# Patient Record
Sex: FEMALE | Race: WHITE | NOT HISPANIC OR LATINO | Employment: UNEMPLOYED | URBAN - METROPOLITAN AREA
[De-identification: names, ages, dates, MRNs, and addresses within clinical notes are randomized per-mention and may not be internally consistent; named-entity substitution may affect disease eponyms.]

---

## 2017-01-02 ENCOUNTER — COMMUNICATION - HEALTHEAST (OUTPATIENT)
Dept: FAMILY MEDICINE | Facility: CLINIC | Age: 52
End: 2017-01-02

## 2017-01-02 DIAGNOSIS — E03.9 HYPOTHYROIDISM: ICD-10-CM

## 2017-02-24 ENCOUNTER — OFFICE VISIT - HEALTHEAST (OUTPATIENT)
Dept: FAMILY MEDICINE | Facility: CLINIC | Age: 52
End: 2017-02-24

## 2017-02-24 DIAGNOSIS — J40 BRONCHITIS: ICD-10-CM

## 2017-02-24 DIAGNOSIS — J32.9 SINUSITIS: ICD-10-CM

## 2017-02-24 DIAGNOSIS — H66.91 RIGHT OTITIS MEDIA: ICD-10-CM

## 2017-03-06 ENCOUNTER — COMMUNICATION - HEALTHEAST (OUTPATIENT)
Dept: FAMILY MEDICINE | Facility: CLINIC | Age: 52
End: 2017-03-06

## 2017-03-06 DIAGNOSIS — E03.9 HYPOTHYROIDISM: ICD-10-CM

## 2017-03-09 ENCOUNTER — COMMUNICATION - HEALTHEAST (OUTPATIENT)
Dept: FAMILY MEDICINE | Facility: CLINIC | Age: 52
End: 2017-03-09

## 2017-03-09 DIAGNOSIS — E03.9 HYPOTHYROIDISM: ICD-10-CM

## 2017-06-05 ENCOUNTER — COMMUNICATION - HEALTHEAST (OUTPATIENT)
Dept: FAMILY MEDICINE | Facility: CLINIC | Age: 52
End: 2017-06-05

## 2017-06-05 DIAGNOSIS — E03.9 HYPOTHYROIDISM: ICD-10-CM

## 2017-07-09 ENCOUNTER — OFFICE VISIT - HEALTHEAST (OUTPATIENT)
Dept: FAMILY MEDICINE | Facility: CLINIC | Age: 52
End: 2017-07-09

## 2017-07-09 DIAGNOSIS — J32.9 SINUSITIS: ICD-10-CM

## 2017-07-09 DIAGNOSIS — J02.9 SORE THROAT: ICD-10-CM

## 2017-09-05 ENCOUNTER — COMMUNICATION - HEALTHEAST (OUTPATIENT)
Dept: FAMILY MEDICINE | Facility: CLINIC | Age: 52
End: 2017-09-05

## 2017-09-05 DIAGNOSIS — E03.9 HYPOTHYROIDISM: ICD-10-CM

## 2017-10-18 ENCOUNTER — COMMUNICATION - HEALTHEAST (OUTPATIENT)
Dept: FAMILY MEDICINE | Facility: CLINIC | Age: 52
End: 2017-10-18

## 2017-11-28 ENCOUNTER — OFFICE VISIT - HEALTHEAST (OUTPATIENT)
Dept: INTERNAL MEDICINE | Facility: CLINIC | Age: 52
End: 2017-11-28

## 2017-11-28 DIAGNOSIS — Z12.31 ENCOUNTER FOR SCREENING MAMMOGRAM FOR BREAST CANCER: ICD-10-CM

## 2017-11-28 DIAGNOSIS — L20.82 FLEXURAL ECZEMA: ICD-10-CM

## 2017-11-28 DIAGNOSIS — R21 RASH/SKIN ERUPTION: ICD-10-CM

## 2017-12-02 ENCOUNTER — COMMUNICATION - HEALTHEAST (OUTPATIENT)
Dept: FAMILY MEDICINE | Facility: CLINIC | Age: 52
End: 2017-12-02

## 2017-12-02 DIAGNOSIS — E78.5 HYPERLIPIDEMIA: ICD-10-CM

## 2017-12-03 ENCOUNTER — COMMUNICATION - HEALTHEAST (OUTPATIENT)
Dept: FAMILY MEDICINE | Facility: CLINIC | Age: 52
End: 2017-12-03

## 2017-12-03 DIAGNOSIS — E03.9 HYPOTHYROIDISM: ICD-10-CM

## 2017-12-21 ENCOUNTER — HOSPITAL ENCOUNTER (OUTPATIENT)
Dept: MAMMOGRAPHY | Facility: HOSPITAL | Age: 52
Discharge: HOME | End: 2017-12-21

## 2018-03-01 ENCOUNTER — COMMUNICATION - HEALTHEAST (OUTPATIENT)
Dept: FAMILY MEDICINE | Facility: CLINIC | Age: 53
End: 2018-03-01

## 2018-03-01 DIAGNOSIS — E03.9 HYPOTHYROIDISM: ICD-10-CM

## 2018-03-02 ENCOUNTER — OFFICE VISIT - HEALTHEAST (OUTPATIENT)
Dept: FAMILY MEDICINE | Facility: CLINIC | Age: 53
End: 2018-03-02

## 2018-03-02 DIAGNOSIS — E66.9 DIABETES MELLITUS TYPE 2 IN OBESE: ICD-10-CM

## 2018-03-02 DIAGNOSIS — E03.9 HYPOTHYROIDISM: ICD-10-CM

## 2018-03-02 DIAGNOSIS — E66.01 MORBID OBESITY (H): ICD-10-CM

## 2018-03-02 DIAGNOSIS — E78.5 HYPERLIPIDEMIA: ICD-10-CM

## 2018-03-02 DIAGNOSIS — E11.69 DIABETES MELLITUS TYPE 2 IN OBESE: ICD-10-CM

## 2018-03-02 DIAGNOSIS — Z12.11 COLON CANCER SCREENING: ICD-10-CM

## 2018-03-02 DIAGNOSIS — H93.8X1 PLUGGED FEELING IN EAR, RIGHT: ICD-10-CM

## 2018-03-02 DIAGNOSIS — R09.81 NASAL CONGESTION: ICD-10-CM

## 2018-03-02 LAB
ALBUMIN SERPL-MCNC: 3.7 G/DL (ref 3.5–5)
ALP SERPL-CCNC: 98 U/L (ref 45–120)
ALT SERPL W P-5'-P-CCNC: 118 U/L (ref 0–45)
ANION GAP SERPL CALCULATED.3IONS-SCNC: 8 MMOL/L (ref 5–18)
AST SERPL W P-5'-P-CCNC: 68 U/L (ref 0–40)
BILIRUB SERPL-MCNC: 0.5 MG/DL (ref 0–1)
BUN SERPL-MCNC: 14 MG/DL (ref 8–22)
CALCIUM SERPL-MCNC: 9.6 MG/DL (ref 8.5–10.5)
CHLORIDE BLD-SCNC: 107 MMOL/L (ref 98–107)
CHOLEST SERPL-MCNC: 189 MG/DL
CO2 SERPL-SCNC: 27 MMOL/L (ref 22–31)
CREAT SERPL-MCNC: 0.91 MG/DL (ref 0.6–1.1)
FASTING STATUS PATIENT QL REPORTED: YES
GFR SERPL CREATININE-BSD FRML MDRD: >60 ML/MIN/1.73M2
GLUCOSE BLD-MCNC: 147 MG/DL (ref 70–125)
HBA1C MFR BLD: 6.5 % (ref 3.5–6)
HDLC SERPL-MCNC: 47 MG/DL
LDLC SERPL CALC-MCNC: 117 MG/DL
POTASSIUM BLD-SCNC: 4.8 MMOL/L (ref 3.5–5)
PROT SERPL-MCNC: 7.2 G/DL (ref 6–8)
SODIUM SERPL-SCNC: 142 MMOL/L (ref 136–145)
TRIGL SERPL-MCNC: 126 MG/DL
TSH SERPL DL<=0.005 MIU/L-ACNC: 0.31 UIU/ML (ref 0.3–5)

## 2018-03-05 ENCOUNTER — COMMUNICATION - HEALTHEAST (OUTPATIENT)
Dept: FAMILY MEDICINE | Facility: CLINIC | Age: 53
End: 2018-03-05

## 2018-10-03 ENCOUNTER — COMMUNICATION - HEALTHEAST (OUTPATIENT)
Dept: FAMILY MEDICINE | Facility: CLINIC | Age: 53
End: 2018-10-03

## 2018-10-03 DIAGNOSIS — E78.5 HYPERLIPIDEMIA: ICD-10-CM

## 2018-10-03 DIAGNOSIS — E03.9 HYPOTHYROIDISM: ICD-10-CM

## 2018-12-20 ENCOUNTER — COMMUNICATION - HEALTHEAST (OUTPATIENT)
Dept: FAMILY MEDICINE | Facility: CLINIC | Age: 53
End: 2018-12-20

## 2018-12-20 DIAGNOSIS — E78.5 HYPERLIPIDEMIA: ICD-10-CM

## 2018-12-31 ENCOUNTER — COMMUNICATION - HEALTHEAST (OUTPATIENT)
Dept: FAMILY MEDICINE | Facility: CLINIC | Age: 53
End: 2018-12-31

## 2018-12-31 DIAGNOSIS — E03.9 HYPOTHYROIDISM: ICD-10-CM

## 2019-03-27 ENCOUNTER — COMMUNICATION - HEALTHEAST (OUTPATIENT)
Dept: FAMILY MEDICINE | Facility: CLINIC | Age: 54
End: 2019-03-27

## 2019-03-27 DIAGNOSIS — E78.5 HYPERLIPIDEMIA: ICD-10-CM

## 2019-04-01 ENCOUNTER — COMMUNICATION - HEALTHEAST (OUTPATIENT)
Dept: FAMILY MEDICINE | Facility: CLINIC | Age: 54
End: 2019-04-01

## 2019-04-01 DIAGNOSIS — E03.9 HYPOTHYROIDISM: ICD-10-CM

## 2019-04-11 ENCOUNTER — COMMUNICATION - HEALTHEAST (OUTPATIENT)
Dept: FAMILY MEDICINE | Facility: CLINIC | Age: 54
End: 2019-04-11

## 2019-04-11 DIAGNOSIS — H93.8X1 PLUGGED FEELING IN EAR, RIGHT: ICD-10-CM

## 2019-04-11 DIAGNOSIS — E78.5 HYPERLIPIDEMIA: ICD-10-CM

## 2019-04-11 DIAGNOSIS — R09.81 NASAL CONGESTION: ICD-10-CM

## 2019-04-11 DIAGNOSIS — L20.82 FLEXURAL ECZEMA: ICD-10-CM

## 2019-04-11 DIAGNOSIS — E66.9 DIABETES MELLITUS TYPE 2 IN OBESE: ICD-10-CM

## 2019-04-11 DIAGNOSIS — E11.69 DIABETES MELLITUS TYPE 2 IN OBESE: ICD-10-CM

## 2019-07-07 ENCOUNTER — COMMUNICATION - HEALTHEAST (OUTPATIENT)
Dept: FAMILY MEDICINE | Facility: CLINIC | Age: 54
End: 2019-07-07

## 2019-07-07 DIAGNOSIS — E03.9 HYPOTHYROIDISM: ICD-10-CM

## 2019-08-08 ENCOUNTER — OFFICE VISIT - HEALTHEAST (OUTPATIENT)
Dept: FAMILY MEDICINE | Facility: CLINIC | Age: 54
End: 2019-08-08

## 2019-08-08 DIAGNOSIS — L20.82 FLEXURAL ECZEMA: ICD-10-CM

## 2019-08-08 DIAGNOSIS — E11.69 DIABETES MELLITUS TYPE 2 IN OBESE: ICD-10-CM

## 2019-08-08 DIAGNOSIS — Z12.11 SCREENING FOR COLON CANCER: ICD-10-CM

## 2019-08-08 DIAGNOSIS — Z12.31 VISIT FOR SCREENING MAMMOGRAM: ICD-10-CM

## 2019-08-08 DIAGNOSIS — Z78.0 MENOPAUSE: ICD-10-CM

## 2019-08-08 DIAGNOSIS — E03.9 HYPOTHYROIDISM: ICD-10-CM

## 2019-08-08 DIAGNOSIS — E78.5 HYPERLIPIDEMIA: ICD-10-CM

## 2019-08-08 DIAGNOSIS — E78.5 HYPERLIPIDEMIA, UNSPECIFIED HYPERLIPIDEMIA TYPE: ICD-10-CM

## 2019-08-08 DIAGNOSIS — E66.9 DIABETES MELLITUS TYPE 2 IN OBESE: ICD-10-CM

## 2019-08-08 DIAGNOSIS — E03.9 HYPOTHYROIDISM, UNSPECIFIED TYPE: ICD-10-CM

## 2019-08-08 DIAGNOSIS — Z00.00 ROUTINE GENERAL MEDICAL EXAMINATION AT A HEALTH CARE FACILITY: ICD-10-CM

## 2019-08-08 LAB
ALBUMIN SERPL-MCNC: 4 G/DL (ref 3.5–5)
ALP SERPL-CCNC: 108 U/L (ref 45–120)
ALT SERPL W P-5'-P-CCNC: 48 U/L (ref 0–45)
ANION GAP SERPL CALCULATED.3IONS-SCNC: 10 MMOL/L (ref 5–18)
AST SERPL W P-5'-P-CCNC: 30 U/L (ref 0–40)
BILIRUB SERPL-MCNC: 0.3 MG/DL (ref 0–1)
BUN SERPL-MCNC: 22 MG/DL (ref 8–22)
CALCIUM SERPL-MCNC: 10 MG/DL (ref 8.5–10.5)
CHLORIDE BLD-SCNC: 109 MMOL/L (ref 98–107)
CHOLEST SERPL-MCNC: 179 MG/DL
CO2 SERPL-SCNC: 25 MMOL/L (ref 22–31)
CREAT SERPL-MCNC: 1 MG/DL (ref 0.6–1.1)
CREAT UR-MCNC: 204 MG/DL
ERYTHROCYTE [DISTWIDTH] IN BLOOD BY AUTOMATED COUNT: 11.6 % (ref 11–14.5)
FASTING STATUS PATIENT QL REPORTED: NO
GFR SERPL CREATININE-BSD FRML MDRD: 58 ML/MIN/1.73M2
GLUCOSE BLD-MCNC: 143 MG/DL (ref 70–125)
HBA1C MFR BLD: 6.4 % (ref 3.5–6)
HCT VFR BLD AUTO: 41.7 % (ref 35–47)
HDLC SERPL-MCNC: 49 MG/DL
HGB BLD-MCNC: 13.8 G/DL (ref 12–16)
LDLC SERPL CALC-MCNC: 96 MG/DL
MCH RBC QN AUTO: 29.9 PG (ref 27–34)
MCHC RBC AUTO-ENTMCNC: 33.1 G/DL (ref 32–36)
MCV RBC AUTO: 90 FL (ref 80–100)
MICROALBUMIN UR-MCNC: 1.81 MG/DL (ref 0–1.99)
MICROALBUMIN/CREAT UR: 8.9 MG/G
PLATELET # BLD AUTO: 280 THOU/UL (ref 140–440)
PMV BLD AUTO: 8.1 FL (ref 7–10)
POTASSIUM BLD-SCNC: 4.4 MMOL/L (ref 3.5–5)
PROT SERPL-MCNC: 7.3 G/DL (ref 6–8)
RBC # BLD AUTO: 4.62 MILL/UL (ref 3.8–5.4)
SODIUM SERPL-SCNC: 144 MMOL/L (ref 136–145)
TRIGL SERPL-MCNC: 171 MG/DL
TSH SERPL DL<=0.005 MIU/L-ACNC: 0.7 UIU/ML (ref 0.3–5)
WBC: 7.5 THOU/UL (ref 4–11)

## 2019-08-08 RX ORDER — TRIAMCINOLONE ACETONIDE 1 MG/G
CREAM TOPICAL 2 TIMES DAILY
Qty: 45 G | Refills: 3 | Status: SHIPPED | OUTPATIENT
Start: 2019-08-08 | End: 2021-10-04

## 2019-08-08 ASSESSMENT — MIFFLIN-ST. JEOR: SCORE: 2027.37

## 2019-08-09 LAB
HPV SOURCE: NORMAL
HUMAN PAPILLOMA VIRUS 16 DNA: NEGATIVE
HUMAN PAPILLOMA VIRUS 18 DNA: NEGATIVE
HUMAN PAPILLOMA VIRUS FINAL DIAGNOSIS: NORMAL
HUMAN PAPILLOMA VIRUS OTHER HR: NEGATIVE
SPECIMEN DESCRIPTION: NORMAL

## 2019-08-15 ENCOUNTER — AMBULATORY - HEALTHEAST (OUTPATIENT)
Dept: FAMILY MEDICINE | Facility: CLINIC | Age: 54
End: 2019-08-15

## 2019-08-15 DIAGNOSIS — E11.69 DIABETES MELLITUS TYPE 2 IN OBESE: ICD-10-CM

## 2019-08-15 DIAGNOSIS — E66.9 DIABETES MELLITUS TYPE 2 IN OBESE: ICD-10-CM

## 2019-08-15 LAB
BKR LAB AP ABNORMAL BLEEDING: NO
BKR LAB AP BIRTH CONTROL/HORMONES: NORMAL
BKR LAB AP CERVICAL APPEARANCE: NORMAL
BKR LAB AP GYN ADEQUACY: NORMAL
BKR LAB AP GYN INTERPRETATION: NORMAL
BKR LAB AP HPV REFLEX: NORMAL
BKR LAB AP LMP: NORMAL
BKR LAB AP PATIENT STATUS: NORMAL
BKR LAB AP PREVIOUS ABNORMAL: NO
BKR LAB AP PREVIOUS NORMAL: NORMAL
HIGH RISK?: NO
PATH REPORT.COMMENTS IMP SPEC: NORMAL
RESULT FLAG (HE HISTORICAL CONVERSION): NORMAL

## 2019-08-16 ENCOUNTER — COMMUNICATION - HEALTHEAST (OUTPATIENT)
Dept: FAMILY MEDICINE | Facility: CLINIC | Age: 54
End: 2019-08-16

## 2019-12-18 ENCOUNTER — HOSPITAL ENCOUNTER (OUTPATIENT)
Dept: MAMMOGRAPHY | Facility: CLINIC | Age: 54
Discharge: HOME | End: 2019-12-18
Attending: FAMILY MEDICINE

## 2019-12-18 DIAGNOSIS — Z12.31 VISIT FOR SCREENING MAMMOGRAM: ICD-10-CM

## 2020-07-29 ENCOUNTER — OFFICE VISIT - HEALTHEAST (OUTPATIENT)
Dept: FAMILY MEDICINE | Facility: CLINIC | Age: 55
End: 2020-07-29

## 2020-07-29 DIAGNOSIS — H61.23 BILATERAL IMPACTED CERUMEN: ICD-10-CM

## 2020-07-29 DIAGNOSIS — M54.50 CHRONIC BILATERAL LOW BACK PAIN WITHOUT SCIATICA: ICD-10-CM

## 2020-07-29 DIAGNOSIS — E78.5 HYPERLIPIDEMIA, UNSPECIFIED HYPERLIPIDEMIA TYPE: ICD-10-CM

## 2020-07-29 DIAGNOSIS — R29.898 LEG WEAKNESS, BILATERAL: ICD-10-CM

## 2020-07-29 DIAGNOSIS — E11.69 DIABETES MELLITUS TYPE 2 IN OBESE: ICD-10-CM

## 2020-07-29 DIAGNOSIS — E03.9 HYPOTHYROIDISM, UNSPECIFIED TYPE: ICD-10-CM

## 2020-07-29 DIAGNOSIS — E66.9 DIABETES MELLITUS TYPE 2 IN OBESE: ICD-10-CM

## 2020-07-29 DIAGNOSIS — G89.29 CHRONIC BILATERAL LOW BACK PAIN WITHOUT SCIATICA: ICD-10-CM

## 2020-07-29 LAB
ALBUMIN SERPL-MCNC: 3.9 G/DL (ref 3.5–5)
ALP SERPL-CCNC: 137 U/L (ref 45–120)
ALT SERPL W P-5'-P-CCNC: 50 U/L (ref 0–45)
ANION GAP SERPL CALCULATED.3IONS-SCNC: 12 MMOL/L (ref 5–18)
AST SERPL W P-5'-P-CCNC: 33 U/L (ref 0–40)
BILIRUB SERPL-MCNC: 0.4 MG/DL (ref 0–1)
BUN SERPL-MCNC: 18 MG/DL (ref 8–22)
CALCIUM SERPL-MCNC: 9.8 MG/DL (ref 8.5–10.5)
CHLORIDE BLD-SCNC: 104 MMOL/L (ref 98–107)
CHOLEST SERPL-MCNC: 205 MG/DL
CO2 SERPL-SCNC: 24 MMOL/L (ref 22–31)
CREAT SERPL-MCNC: 0.93 MG/DL (ref 0.6–1.1)
ERYTHROCYTE [DISTWIDTH] IN BLOOD BY AUTOMATED COUNT: 11.5 % (ref 11–14.5)
FASTING STATUS PATIENT QL REPORTED: NO
GFR SERPL CREATININE-BSD FRML MDRD: >60 ML/MIN/1.73M2
GLUCOSE BLD-MCNC: 103 MG/DL (ref 70–125)
HBA1C MFR BLD: 6.5 % (ref 3.5–6)
HCT VFR BLD AUTO: 44 % (ref 35–47)
HDLC SERPL-MCNC: 43 MG/DL
HGB BLD-MCNC: 14.6 G/DL (ref 12–16)
LDLC SERPL CALC-MCNC: 130 MG/DL
MCH RBC QN AUTO: 30.1 PG (ref 27–34)
MCHC RBC AUTO-ENTMCNC: 33.1 G/DL (ref 32–36)
MCV RBC AUTO: 91 FL (ref 80–100)
PLATELET # BLD AUTO: 321 THOU/UL (ref 140–440)
PMV BLD AUTO: 7.9 FL (ref 7–10)
POTASSIUM BLD-SCNC: 4.3 MMOL/L (ref 3.5–5)
PROT SERPL-MCNC: 7.9 G/DL (ref 6–8)
RBC # BLD AUTO: 4.83 MILL/UL (ref 3.8–5.4)
SODIUM SERPL-SCNC: 140 MMOL/L (ref 136–145)
TRIGL SERPL-MCNC: 160 MG/DL
TSH SERPL DL<=0.005 MIU/L-ACNC: 1.67 UIU/ML (ref 0.3–5)
WBC: 7.4 THOU/UL (ref 4–11)

## 2020-07-30 ENCOUNTER — AMBULATORY - HEALTHEAST (OUTPATIENT)
Dept: FAMILY MEDICINE | Facility: CLINIC | Age: 55
End: 2020-07-30

## 2020-07-30 DIAGNOSIS — E11.69 DIABETES MELLITUS TYPE 2 IN OBESE: ICD-10-CM

## 2020-07-30 DIAGNOSIS — E66.9 DIABETES MELLITUS TYPE 2 IN OBESE: ICD-10-CM

## 2020-07-30 LAB — 25(OH)D3 SERPL-MCNC: 14.8 NG/ML (ref 30–80)

## 2020-08-04 ENCOUNTER — COMMUNICATION - HEALTHEAST (OUTPATIENT)
Dept: FAMILY MEDICINE | Facility: CLINIC | Age: 55
End: 2020-08-04

## 2020-08-04 DIAGNOSIS — G89.29 CHRONIC BILATERAL LOW BACK PAIN WITHOUT SCIATICA: ICD-10-CM

## 2020-08-04 DIAGNOSIS — M54.50 CHRONIC BILATERAL LOW BACK PAIN WITHOUT SCIATICA: ICD-10-CM

## 2020-08-04 DIAGNOSIS — R29.898 LEG WEAKNESS, BILATERAL: ICD-10-CM

## 2020-08-05 RX ORDER — CYCLOBENZAPRINE HCL 10 MG
10 TABLET ORAL 3 TIMES DAILY PRN
Qty: 30 TABLET | Refills: 3 | Status: SHIPPED | OUTPATIENT
Start: 2020-08-05 | End: 2021-09-24

## 2020-08-07 ENCOUNTER — HOSPITAL ENCOUNTER (OUTPATIENT)
Dept: MRI IMAGING | Facility: CLINIC | Age: 55
Discharge: HOME | End: 2020-08-07
Attending: FAMILY MEDICINE

## 2020-08-07 DIAGNOSIS — M54.50 CHRONIC BILATERAL LOW BACK PAIN WITHOUT SCIATICA: ICD-10-CM

## 2020-08-07 DIAGNOSIS — R29.898 LEG WEAKNESS, BILATERAL: ICD-10-CM

## 2020-08-07 DIAGNOSIS — G89.29 CHRONIC BILATERAL LOW BACK PAIN WITHOUT SCIATICA: ICD-10-CM

## 2020-08-12 ENCOUNTER — OFFICE VISIT - HEALTHEAST (OUTPATIENT)
Dept: FAMILY MEDICINE | Facility: CLINIC | Age: 55
End: 2020-08-12

## 2020-08-12 DIAGNOSIS — H65.01 NON-RECURRENT ACUTE SEROUS OTITIS MEDIA OF RIGHT EAR: ICD-10-CM

## 2020-08-12 DIAGNOSIS — R03.0 ELEVATED BLOOD PRESSURE READING WITHOUT DIAGNOSIS OF HYPERTENSION: ICD-10-CM

## 2020-08-18 ENCOUNTER — HOSPITAL ENCOUNTER (OUTPATIENT)
Dept: MRI IMAGING | Facility: HOSPITAL | Age: 55
Discharge: HOME | End: 2020-08-18
Attending: FAMILY MEDICINE

## 2020-08-19 ENCOUNTER — COMMUNICATION - HEALTHEAST (OUTPATIENT)
Dept: FAMILY MEDICINE | Facility: CLINIC | Age: 55
End: 2020-08-19

## 2020-08-19 ENCOUNTER — AMBULATORY - HEALTHEAST (OUTPATIENT)
Dept: FAMILY MEDICINE | Facility: CLINIC | Age: 55
End: 2020-08-19

## 2020-08-19 DIAGNOSIS — R93.89 ABNORMAL MRI: ICD-10-CM

## 2020-08-20 ENCOUNTER — HOSPITAL ENCOUNTER (OUTPATIENT)
Dept: ULTRASOUND IMAGING | Facility: HOSPITAL | Age: 55
Discharge: HOME | End: 2020-08-20
Attending: FAMILY MEDICINE

## 2020-08-20 ENCOUNTER — AMBULATORY - HEALTHEAST (OUTPATIENT)
Dept: FAMILY MEDICINE | Facility: CLINIC | Age: 55
End: 2020-08-20

## 2020-08-20 ENCOUNTER — COMMUNICATION - HEALTHEAST (OUTPATIENT)
Dept: FAMILY MEDICINE | Facility: CLINIC | Age: 55
End: 2020-08-20

## 2020-08-20 DIAGNOSIS — R93.89 THICKENED ENDOMETRIUM: ICD-10-CM

## 2020-08-20 DIAGNOSIS — R93.89 ABNORMAL MRI: ICD-10-CM

## 2020-08-20 DIAGNOSIS — N85.8 UTERINE MASS: ICD-10-CM

## 2020-08-21 ENCOUNTER — COMMUNICATION - HEALTHEAST (OUTPATIENT)
Dept: FAMILY MEDICINE | Facility: CLINIC | Age: 55
End: 2020-08-21

## 2020-08-24 ENCOUNTER — HOSPITAL ENCOUNTER (OUTPATIENT)
Dept: PHYSICAL MEDICINE AND REHAB | Facility: CLINIC | Age: 55
Discharge: HOME | End: 2020-08-24
Attending: FAMILY MEDICINE

## 2020-08-24 DIAGNOSIS — M47.816 LUMBAR FACET ARTHROPATHY: ICD-10-CM

## 2020-08-24 DIAGNOSIS — M54.16 LUMBAR RADICULAR PAIN: ICD-10-CM

## 2020-08-24 DIAGNOSIS — M46.1 DEGENERATIVE JOINT DISEASE OF SACROILIAC JOINT (H): ICD-10-CM

## 2020-08-24 DIAGNOSIS — M43.16 SPONDYLOLISTHESIS OF LUMBAR REGION: ICD-10-CM

## 2020-08-24 DIAGNOSIS — M16.12 PRIMARY OSTEOARTHRITIS OF LEFT HIP: ICD-10-CM

## 2020-08-24 ASSESSMENT — MIFFLIN-ST. JEOR: SCORE: 2023.76

## 2020-09-01 ENCOUNTER — HOSPITAL ENCOUNTER (OUTPATIENT)
Dept: PHYSICAL MEDICINE AND REHAB | Facility: CLINIC | Age: 55
Discharge: HOME | End: 2020-09-01
Attending: PHYSICIAN ASSISTANT

## 2020-09-01 DIAGNOSIS — M16.12 PRIMARY OSTEOARTHRITIS OF LEFT HIP: ICD-10-CM

## 2020-09-03 ENCOUNTER — TRANSFERRED RECORDS (OUTPATIENT)
Dept: HEALTH INFORMATION MANAGEMENT | Facility: CLINIC | Age: 55
End: 2020-09-03

## 2020-09-03 ENCOUNTER — RECORDS - HEALTHEAST (OUTPATIENT)
Dept: ADMINISTRATIVE | Facility: OTHER | Age: 55
End: 2020-09-03

## 2020-09-03 LAB — PAP-ABSTRACT: ABNORMAL

## 2020-10-02 ENCOUNTER — AMBULATORY - HEALTHEAST (OUTPATIENT)
Dept: SURGERY | Facility: AMBULATORY SURGERY CENTER | Age: 55
End: 2020-10-02

## 2020-10-02 DIAGNOSIS — Z11.59 ENCOUNTER FOR SCREENING FOR OTHER VIRAL DISEASES: ICD-10-CM

## 2020-10-07 ENCOUNTER — COMMUNICATION - HEALTHEAST (OUTPATIENT)
Dept: FAMILY MEDICINE | Facility: CLINIC | Age: 55
End: 2020-10-07

## 2020-10-07 DIAGNOSIS — E78.5 HYPERLIPIDEMIA: ICD-10-CM

## 2020-10-07 DIAGNOSIS — E03.9 HYPOTHYROIDISM: ICD-10-CM

## 2020-10-07 RX ORDER — LEVOTHYROXINE SODIUM 150 UG/1
150 TABLET ORAL DAILY
Qty: 90 TABLET | Refills: 3 | Status: SHIPPED | OUTPATIENT
Start: 2020-10-07 | End: 2021-09-23

## 2020-10-07 RX ORDER — LOVASTATIN 40 MG
40 TABLET ORAL DAILY
Qty: 90 TABLET | Refills: 3 | Status: SHIPPED | OUTPATIENT
Start: 2020-10-07 | End: 2021-09-23

## 2020-10-21 ENCOUNTER — OFFICE VISIT - HEALTHEAST (OUTPATIENT)
Dept: FAMILY MEDICINE | Facility: CLINIC | Age: 55
End: 2020-10-21

## 2020-10-21 DIAGNOSIS — E66.9 DIABETES MELLITUS TYPE 2 IN OBESE: ICD-10-CM

## 2020-10-21 DIAGNOSIS — E78.5 HYPERLIPIDEMIA, UNSPECIFIED HYPERLIPIDEMIA TYPE: ICD-10-CM

## 2020-10-21 DIAGNOSIS — N85.02 ENDOMETRIAL HYPERPLASIA WITH ATYPIA: ICD-10-CM

## 2020-10-21 DIAGNOSIS — E03.9 HYPOTHYROIDISM, UNSPECIFIED TYPE: ICD-10-CM

## 2020-10-21 DIAGNOSIS — M51.369 DDD (DEGENERATIVE DISC DISEASE), LUMBAR: ICD-10-CM

## 2020-10-21 DIAGNOSIS — R53.83 FATIGUE, UNSPECIFIED TYPE: ICD-10-CM

## 2020-10-21 DIAGNOSIS — G47.30 SLEEP APNEA, UNSPECIFIED TYPE: ICD-10-CM

## 2020-10-21 DIAGNOSIS — E11.69 DIABETES MELLITUS TYPE 2 IN OBESE: ICD-10-CM

## 2020-10-21 DIAGNOSIS — M16.0 OSTEOARTHRITIS OF BOTH HIPS, UNSPECIFIED OSTEOARTHRITIS TYPE: ICD-10-CM

## 2020-10-21 DIAGNOSIS — E55.9 VITAMIN D DEFICIENCY: ICD-10-CM

## 2020-10-21 DIAGNOSIS — Z01.818 PRE-OP EXAM: ICD-10-CM

## 2020-10-21 LAB
ALBUMIN SERPL-MCNC: 4 G/DL (ref 3.5–5)
ALP SERPL-CCNC: 108 U/L (ref 45–120)
ALT SERPL W P-5'-P-CCNC: 45 U/L (ref 0–45)
ANION GAP SERPL CALCULATED.3IONS-SCNC: 13 MMOL/L (ref 5–18)
AST SERPL W P-5'-P-CCNC: 32 U/L (ref 0–40)
ATRIAL RATE - MUSE: 102 BPM
BILIRUB SERPL-MCNC: 0.6 MG/DL (ref 0–1)
BUN SERPL-MCNC: 16 MG/DL (ref 8–22)
CALCIUM SERPL-MCNC: 9.8 MG/DL (ref 8.5–10.5)
CHLORIDE BLD-SCNC: 105 MMOL/L (ref 98–107)
CHOLEST SERPL-MCNC: 204 MG/DL
CO2 SERPL-SCNC: 24 MMOL/L (ref 22–31)
CREAT SERPL-MCNC: 1.05 MG/DL (ref 0.6–1.1)
DIASTOLIC BLOOD PRESSURE - MUSE: NORMAL
ERYTHROCYTE [DISTWIDTH] IN BLOOD BY AUTOMATED COUNT: 12.8 % (ref 11–14.5)
FASTING STATUS PATIENT QL REPORTED: YES
GFR SERPL CREATININE-BSD FRML MDRD: 55 ML/MIN/1.73M2
GLUCOSE BLD-MCNC: 137 MG/DL (ref 70–125)
HBA1C MFR BLD: 6.5 %
HCT VFR BLD AUTO: 46.4 % (ref 35–47)
HDLC SERPL-MCNC: 49 MG/DL
HGB BLD-MCNC: 15.3 G/DL (ref 12–16)
INTERPRETATION ECG - MUSE: NORMAL
IRON SATN MFR SERPL: 25 % (ref 20–50)
IRON SERPL-MCNC: 97 UG/DL (ref 42–175)
LDLC SERPL CALC-MCNC: 124 MG/DL
MCH RBC QN AUTO: 29.2 PG (ref 27–34)
MCHC RBC AUTO-ENTMCNC: 33 G/DL (ref 32–36)
MCV RBC AUTO: 88 FL (ref 80–100)
P AXIS - MUSE: 19 DEGREES
PLATELET # BLD AUTO: 304 THOU/UL (ref 140–440)
PMV BLD AUTO: 8.2 FL (ref 7–10)
POTASSIUM BLD-SCNC: 4.6 MMOL/L (ref 3.5–5)
PR INTERVAL - MUSE: 162 MS
PROT SERPL-MCNC: 7.8 G/DL (ref 6–8)
QRS DURATION - MUSE: 78 MS
QT - MUSE: 346 MS
QTC - MUSE: 450 MS
R AXIS - MUSE: 23 DEGREES
RBC # BLD AUTO: 5.25 MILL/UL (ref 3.8–5.4)
SODIUM SERPL-SCNC: 142 MMOL/L (ref 136–145)
SYSTOLIC BLOOD PRESSURE - MUSE: NORMAL
T AXIS - MUSE: 18 DEGREES
TIBC SERPL-MCNC: 386 UG/DL (ref 313–563)
TRANSFERRIN SERPL-MCNC: 309 MG/DL (ref 212–360)
TRIGL SERPL-MCNC: 155 MG/DL
TSH SERPL DL<=0.005 MIU/L-ACNC: 0.97 UIU/ML (ref 0.3–5)
VENTRICULAR RATE- MUSE: 102 BPM
VIT B12 SERPL-MCNC: 476 PG/ML (ref 213–816)
WBC: 7.8 THOU/UL (ref 4–11)

## 2020-10-21 RX ORDER — FEXOFENADINE HCL 60 MG/1
60 TABLET, FILM COATED ORAL DAILY
Status: SHIPPED | COMMUNITY
Start: 2020-10-21

## 2020-10-21 ASSESSMENT — MIFFLIN-ST. JEOR: SCORE: 1945.89

## 2020-10-22 LAB
25(OH)D3 SERPL-MCNC: 11.3 NG/ML (ref 30–80)
25(OH)D3 SERPL-MCNC: 11.3 NG/ML (ref 30–80)

## 2020-10-23 ENCOUNTER — COMMUNICATION - HEALTHEAST (OUTPATIENT)
Dept: PHYSICAL MEDICINE AND REHAB | Facility: CLINIC | Age: 55
End: 2020-10-23

## 2020-10-23 DIAGNOSIS — M25.551 HIP PAIN, RIGHT: ICD-10-CM

## 2020-10-23 DIAGNOSIS — Z53.9 ERRONEOUS ENCOUNTER--DISREGARD: Primary | ICD-10-CM

## 2020-10-23 DIAGNOSIS — Z11.59 SPECIAL SCREENING EXAMINATION FOR VIRAL DISEASE: Primary | ICD-10-CM

## 2020-10-24 DIAGNOSIS — Z11.59 SPECIAL SCREENING EXAMINATION FOR VIRAL DISEASE: ICD-10-CM

## 2020-10-24 PROCEDURE — U0003 INFECTIOUS AGENT DETECTION BY NUCLEIC ACID (DNA OR RNA); SEVERE ACUTE RESPIRATORY SYNDROME CORONAVIRUS 2 (SARS-COV-2) (CORONAVIRUS DISEASE [COVID-19]), AMPLIFIED PROBE TECHNIQUE, MAKING USE OF HIGH THROUGHPUT TECHNOLOGIES AS DESCRIBED BY CMS-2020-01-R: HCPCS | Performed by: OBSTETRICS & GYNECOLOGY

## 2020-10-25 LAB
SARS-COV-2 RNA SPEC QL NAA+PROBE: NOT DETECTED
SPECIMEN SOURCE: NORMAL

## 2020-10-26 ASSESSMENT — MIFFLIN-ST. JEOR: SCORE: 1944.76

## 2020-10-27 ENCOUNTER — ANESTHESIA - HEALTHEAST (OUTPATIENT)
Dept: SURGERY | Facility: AMBULATORY SURGERY CENTER | Age: 55
End: 2020-10-27

## 2020-10-28 ENCOUNTER — SURGERY - HEALTHEAST (OUTPATIENT)
Dept: SURGERY | Facility: AMBULATORY SURGERY CENTER | Age: 55
End: 2020-10-28

## 2020-10-28 ENCOUNTER — TRANSFERRED RECORDS (OUTPATIENT)
Dept: HEALTH INFORMATION MANAGEMENT | Facility: CLINIC | Age: 55
End: 2020-10-28

## 2020-10-28 ASSESSMENT — MIFFLIN-ST. JEOR: SCORE: 1944.76

## 2020-11-04 ENCOUNTER — HOSPITAL ENCOUNTER (OUTPATIENT)
Dept: PHYSICAL MEDICINE AND REHAB | Facility: CLINIC | Age: 55
Discharge: HOME | End: 2020-11-04
Attending: PHYSICIAN ASSISTANT

## 2020-11-04 DIAGNOSIS — M25.551 HIP PAIN, RIGHT: ICD-10-CM

## 2020-11-12 ENCOUNTER — TRANSFERRED RECORDS (OUTPATIENT)
Dept: HEALTH INFORMATION MANAGEMENT | Facility: CLINIC | Age: 55
End: 2020-11-12

## 2020-11-19 ENCOUNTER — HOSPITAL ENCOUNTER (OUTPATIENT)
Dept: PHYSICAL MEDICINE AND REHAB | Facility: CLINIC | Age: 55
Discharge: HOME | End: 2020-11-19
Attending: PHYSICIAN ASSISTANT

## 2020-11-19 DIAGNOSIS — M25.551 HIP PAIN, RIGHT: ICD-10-CM

## 2020-11-19 DIAGNOSIS — M16.12 PRIMARY OSTEOARTHRITIS OF LEFT HIP: ICD-10-CM

## 2020-11-19 DIAGNOSIS — M54.16 LUMBAR RADICULAR PAIN: ICD-10-CM

## 2020-11-19 DIAGNOSIS — M47.816 LUMBAR FACET ARTHROPATHY: ICD-10-CM

## 2020-11-19 ASSESSMENT — MIFFLIN-ST. JEOR: SCORE: 1949.76

## 2020-11-24 ENCOUNTER — OFFICE VISIT - HEALTHEAST (OUTPATIENT)
Dept: PHYSICAL THERAPY | Facility: REHABILITATION | Age: 55
End: 2020-11-24

## 2020-11-24 DIAGNOSIS — M54.16 LUMBAR RADICULAR PAIN: ICD-10-CM

## 2020-11-24 DIAGNOSIS — M51.369 DDD (DEGENERATIVE DISC DISEASE), LUMBAR: ICD-10-CM

## 2020-11-24 DIAGNOSIS — M25.551 HIP PAIN, RIGHT: ICD-10-CM

## 2020-11-24 DIAGNOSIS — M47.816 LUMBAR FACET ARTHROPATHY: ICD-10-CM

## 2020-11-24 DIAGNOSIS — M16.0 OSTEOARTHRITIS OF BOTH HIPS, UNSPECIFIED OSTEOARTHRITIS TYPE: ICD-10-CM

## 2020-12-02 ENCOUNTER — OFFICE VISIT - HEALTHEAST (OUTPATIENT)
Dept: PHYSICAL THERAPY | Facility: REHABILITATION | Age: 55
End: 2020-12-02

## 2020-12-02 ENCOUNTER — HOSPITAL ENCOUNTER (OUTPATIENT)
Dept: PHYSICAL MEDICINE AND REHAB | Facility: CLINIC | Age: 55
Discharge: HOME | End: 2020-12-02
Attending: PHYSICIAN ASSISTANT

## 2020-12-02 DIAGNOSIS — M54.16 LUMBAR RADICULAR PAIN: ICD-10-CM

## 2020-12-02 DIAGNOSIS — M51.369 DDD (DEGENERATIVE DISC DISEASE), LUMBAR: ICD-10-CM

## 2020-12-02 DIAGNOSIS — M47.816 LUMBAR FACET ARTHROPATHY: ICD-10-CM

## 2020-12-02 DIAGNOSIS — M25.551 HIP PAIN, RIGHT: ICD-10-CM

## 2020-12-02 DIAGNOSIS — M16.0 OSTEOARTHRITIS OF BOTH HIPS, UNSPECIFIED OSTEOARTHRITIS TYPE: ICD-10-CM

## 2020-12-07 ENCOUNTER — OFFICE VISIT - HEALTHEAST (OUTPATIENT)
Dept: PHYSICAL THERAPY | Facility: REHABILITATION | Age: 55
End: 2020-12-07

## 2020-12-07 DIAGNOSIS — M47.816 LUMBAR FACET ARTHROPATHY: ICD-10-CM

## 2020-12-07 DIAGNOSIS — M54.16 LUMBAR RADICULAR PAIN: ICD-10-CM

## 2020-12-07 DIAGNOSIS — M16.0 OSTEOARTHRITIS OF BOTH HIPS, UNSPECIFIED OSTEOARTHRITIS TYPE: ICD-10-CM

## 2020-12-07 DIAGNOSIS — M25.551 HIP PAIN, RIGHT: ICD-10-CM

## 2020-12-07 DIAGNOSIS — M51.369 DDD (DEGENERATIVE DISC DISEASE), LUMBAR: ICD-10-CM

## 2020-12-09 ENCOUNTER — OFFICE VISIT - HEALTHEAST (OUTPATIENT)
Dept: PHYSICAL THERAPY | Facility: REHABILITATION | Age: 55
End: 2020-12-09

## 2020-12-09 DIAGNOSIS — M51.369 DDD (DEGENERATIVE DISC DISEASE), LUMBAR: ICD-10-CM

## 2020-12-09 DIAGNOSIS — M25.551 HIP PAIN, RIGHT: ICD-10-CM

## 2020-12-09 DIAGNOSIS — M47.816 LUMBAR FACET ARTHROPATHY: ICD-10-CM

## 2020-12-09 DIAGNOSIS — M54.16 LUMBAR RADICULAR PAIN: ICD-10-CM

## 2020-12-09 DIAGNOSIS — M16.0 OSTEOARTHRITIS OF BOTH HIPS, UNSPECIFIED OSTEOARTHRITIS TYPE: ICD-10-CM

## 2020-12-10 ENCOUNTER — HOSPITAL ENCOUNTER (OUTPATIENT)
Dept: PHYSICAL MEDICINE AND REHAB | Facility: CLINIC | Age: 55
Discharge: HOME | End: 2020-12-10
Attending: PHYSICIAN ASSISTANT

## 2020-12-10 DIAGNOSIS — M54.16 LUMBAR RADICULAR PAIN: ICD-10-CM

## 2020-12-16 ENCOUNTER — OFFICE VISIT - HEALTHEAST (OUTPATIENT)
Dept: PHYSICAL THERAPY | Facility: REHABILITATION | Age: 55
End: 2020-12-16

## 2020-12-16 DIAGNOSIS — M25.551 HIP PAIN, RIGHT: ICD-10-CM

## 2020-12-16 DIAGNOSIS — M51.369 DDD (DEGENERATIVE DISC DISEASE), LUMBAR: ICD-10-CM

## 2020-12-16 DIAGNOSIS — M16.0 OSTEOARTHRITIS OF BOTH HIPS, UNSPECIFIED OSTEOARTHRITIS TYPE: ICD-10-CM

## 2020-12-18 ENCOUNTER — OFFICE VISIT - HEALTHEAST (OUTPATIENT)
Dept: PHYSICAL THERAPY | Facility: REHABILITATION | Age: 55
End: 2020-12-18

## 2020-12-18 ENCOUNTER — HOSPITAL ENCOUNTER (OUTPATIENT)
Dept: PHYSICAL MEDICINE AND REHAB | Facility: CLINIC | Age: 55
Discharge: HOME | End: 2020-12-18
Attending: PHYSICIAN ASSISTANT

## 2020-12-18 DIAGNOSIS — M16.12 PRIMARY OSTEOARTHRITIS OF LEFT HIP: ICD-10-CM

## 2020-12-18 DIAGNOSIS — M25.551 HIP PAIN, RIGHT: ICD-10-CM

## 2020-12-18 DIAGNOSIS — M54.16 LUMBAR RADICULAR PAIN: ICD-10-CM

## 2020-12-18 DIAGNOSIS — M47.816 LUMBAR FACET ARTHROPATHY: ICD-10-CM

## 2020-12-18 DIAGNOSIS — M16.0 OSTEOARTHRITIS OF BOTH HIPS, UNSPECIFIED OSTEOARTHRITIS TYPE: ICD-10-CM

## 2020-12-18 DIAGNOSIS — M16.51 POST-TRAUMATIC OSTEOARTHRITIS OF RIGHT HIP: ICD-10-CM

## 2020-12-18 DIAGNOSIS — M51.369 DDD (DEGENERATIVE DISC DISEASE), LUMBAR: ICD-10-CM

## 2020-12-18 DIAGNOSIS — M79.18 MYOFASCIAL PAIN: ICD-10-CM

## 2020-12-21 ENCOUNTER — OFFICE VISIT - HEALTHEAST (OUTPATIENT)
Dept: PHYSICAL THERAPY | Facility: REHABILITATION | Age: 55
End: 2020-12-21

## 2020-12-21 DIAGNOSIS — M16.0 OSTEOARTHRITIS OF BOTH HIPS, UNSPECIFIED OSTEOARTHRITIS TYPE: ICD-10-CM

## 2020-12-21 DIAGNOSIS — M51.369 DDD (DEGENERATIVE DISC DISEASE), LUMBAR: ICD-10-CM

## 2020-12-21 DIAGNOSIS — M25.551 HIP PAIN, RIGHT: ICD-10-CM

## 2020-12-21 DIAGNOSIS — M47.816 LUMBAR FACET ARTHROPATHY: ICD-10-CM

## 2020-12-21 DIAGNOSIS — M54.16 LUMBAR RADICULAR PAIN: ICD-10-CM

## 2020-12-23 ENCOUNTER — OFFICE VISIT - HEALTHEAST (OUTPATIENT)
Dept: PHYSICAL THERAPY | Facility: REHABILITATION | Age: 55
End: 2020-12-23

## 2020-12-23 DIAGNOSIS — M47.816 LUMBAR FACET ARTHROPATHY: ICD-10-CM

## 2020-12-23 DIAGNOSIS — M16.0 OSTEOARTHRITIS OF BOTH HIPS, UNSPECIFIED OSTEOARTHRITIS TYPE: ICD-10-CM

## 2020-12-23 DIAGNOSIS — M54.16 LUMBAR RADICULAR PAIN: ICD-10-CM

## 2020-12-23 DIAGNOSIS — M25.551 HIP PAIN, RIGHT: ICD-10-CM

## 2020-12-23 DIAGNOSIS — M51.369 DDD (DEGENERATIVE DISC DISEASE), LUMBAR: ICD-10-CM

## 2020-12-28 ENCOUNTER — OFFICE VISIT - HEALTHEAST (OUTPATIENT)
Dept: PHYSICAL THERAPY | Facility: REHABILITATION | Age: 55
End: 2020-12-28

## 2020-12-28 DIAGNOSIS — M16.0 OSTEOARTHRITIS OF BOTH HIPS, UNSPECIFIED OSTEOARTHRITIS TYPE: ICD-10-CM

## 2020-12-28 DIAGNOSIS — M51.369 DDD (DEGENERATIVE DISC DISEASE), LUMBAR: ICD-10-CM

## 2020-12-28 DIAGNOSIS — M25.551 HIP PAIN, RIGHT: ICD-10-CM

## 2020-12-30 ENCOUNTER — OFFICE VISIT - HEALTHEAST (OUTPATIENT)
Dept: PHYSICAL THERAPY | Facility: REHABILITATION | Age: 55
End: 2020-12-30

## 2020-12-30 DIAGNOSIS — M51.369 DDD (DEGENERATIVE DISC DISEASE), LUMBAR: ICD-10-CM

## 2020-12-30 DIAGNOSIS — M16.0 OSTEOARTHRITIS OF BOTH HIPS, UNSPECIFIED OSTEOARTHRITIS TYPE: ICD-10-CM

## 2020-12-31 ENCOUNTER — COMMUNICATION - HEALTHEAST (OUTPATIENT)
Dept: FAMILY MEDICINE | Facility: CLINIC | Age: 55
End: 2020-12-31

## 2020-12-31 DIAGNOSIS — E78.5 HYPERLIPIDEMIA: ICD-10-CM

## 2021-01-21 ENCOUNTER — COMMUNICATION - HEALTHEAST (OUTPATIENT)
Dept: PHYSICAL MEDICINE AND REHAB | Facility: CLINIC | Age: 56
End: 2021-01-21

## 2021-01-21 DIAGNOSIS — M54.16 LUMBAR RADICULAR PAIN: ICD-10-CM

## 2021-01-25 ENCOUNTER — HOSPITAL ENCOUNTER (OUTPATIENT)
Dept: MAMMOGRAPHY | Facility: CLINIC | Age: 56
Discharge: HOME | End: 2021-01-25
Attending: FAMILY MEDICINE

## 2021-01-25 DIAGNOSIS — Z12.31 VISIT FOR SCREENING MAMMOGRAM: ICD-10-CM

## 2021-03-27 ENCOUNTER — AMBULATORY - HEALTHEAST (OUTPATIENT)
Dept: NURSING | Facility: CLINIC | Age: 56
End: 2021-03-27

## 2021-04-17 ENCOUNTER — AMBULATORY - HEALTHEAST (OUTPATIENT)
Dept: NURSING | Facility: CLINIC | Age: 56
End: 2021-04-17

## 2021-05-14 ENCOUNTER — COMMUNICATION - HEALTHEAST (OUTPATIENT)
Dept: PHYSICAL MEDICINE AND REHAB | Facility: CLINIC | Age: 56
End: 2021-05-14

## 2021-05-14 DIAGNOSIS — M25.551 HIP PAIN, RIGHT: ICD-10-CM

## 2021-05-14 DIAGNOSIS — M16.12 PRIMARY OSTEOARTHRITIS OF LEFT HIP: ICD-10-CM

## 2021-05-14 DIAGNOSIS — M43.16 SPONDYLOLISTHESIS OF LUMBAR REGION: ICD-10-CM

## 2021-05-26 ENCOUNTER — RECORDS - HEALTHEAST (OUTPATIENT)
Dept: ADMINISTRATIVE | Facility: CLINIC | Age: 56
End: 2021-05-26

## 2021-05-27 NOTE — TELEPHONE ENCOUNTER
RN cannot approve Refill Request    RN can NOT refill this medication PCP messaged that patient is overdue for Office Visit. Last office visit: 11/23/2015 Ellen Kelsey MD Last Physical: Visit date not found Last MTM visit: Visit date not found Last visit same specialty: 3/2/2018 Latricia Ly MD.  Next visit within 3 mo: Visit date not found  Next physical within 3 mo: Visit date not found      Otilia Sharma, [facility] Triage/Med Refill 3/27/2019    Requested Prescriptions   Pending Prescriptions Disp Refills     lovastatin (MEVACOR) 40 MG tablet 90 tablet 0     Sig: Take 1 tablet (40 mg total) by mouth daily.    Statins Refill Protocol (Hmg CoA Reductase Inhibitors) Failed - 3/27/2019  8:38 PM       Failed - PCP or prescribing provider visit in past 12 months     Last office visit with prescriber/PCP: 11/23/2015 Ellen Kelsey MD OR same dept: Visit date not found OR same specialty: 3/2/2018 Latricia Ly MD  Last physical: Visit date not found Last MTM visit: Visit date not found   Next visit within 3 mo: Visit date not found  Next physical within 3 mo: Visit date not found  Prescriber OR PCP: Ellen Kelsey MD  Last diagnosis associated with med order: 1. Hyperlipidemia  - lovastatin (MEVACOR) 40 MG tablet; Take 1 tablet (40 mg total) by mouth daily.  Dispense: 90 tablet; Refill: 0    If protocol passes may refill for 12 months if within 3 months of last provider visit (or a total of 15 months).

## 2021-05-27 NOTE — TELEPHONE ENCOUNTER
I will give her a refill but please help her set up a physical and come fasting for labs.  Thank you.   [facility]  Chief Complaint   Patient presents with     [facility] Regulatory     [facility] Medical Record Number:  [id number]  Place of Service where encounter took place:  [facility] ([facility]) [[id number]]    HPI:    Joelle Freeman  is 89 year old (5/25/1927), who is being seen today for a federally mandated E/M visit.  HPI information obtained from: facility chart records, facility staff, patient report and [facility] Epic chart review. Today's concerns are:  Patient seen today for regulatory visit. She is in good spirits, talkative, confused. Staff without acute concerns. Sleeping well. Good appetite. Patient denies headache or dizziness. Denies SOB, CP or cough.    SBP  for the last month with HR generally 70-80s. O2 93-98% on RA. Weight 112# on 11/29, was 110# on admission in Aug.    ALLERGIES:Tramadol  PAST MEDICAL HISTORY:   has a past medical history of Acute, but ill-defined, cerebrovascular disease (6/7/2003), Anemia of chronic disease (4/6/2014), Asthma, CAD (coronary artery disease), Cerebral ventriculomegaly, CVA (cerebral infarction) (2002), Dementia (H), Dementia (H), Depression, Diverticulosis, Dyslipidemia, Heterozygous factor V Leiden mutation (H), HTN (hypertension), Hyperlipidemia, Hypothyroidism, Normal pressure hydrocephalus (H), Posterior reversible encephalopathy syndrome, Subarachnoid hemorrhage (H) (3/23/2013), Subdural hematoma (H), Syncope (7/19/2020), TIA (transient ischaemic attack), TIA (transient ischaemic attack), Unspecified cerebral artery occlusion with cerebral infarction, Urinary incontinence, and UTI (lower urinary tract infection).  PAST SURGICAL HISTORY:   has a past surgical history that includes Cholecystectomy; hernia repair; Hysterectomy; Stent (2001); lumbar puncture; Cardiac surgery; Abdomen surgery; appendectomy; biopsy; ENT surgery; wisdom teeth extraction[; GYN surgery; GYN surgery; Optical tracking system implant shunt  ventriculoperitoneal (7/16/2013); and Open reduction internal fixation hip nailing (11/18/2013).  FAMILY HISTORY: family history includes Blood Disease in her son; Cerebrovascular Disease in her father and mother.  SOCIAL HISTORY:  reports that she has never smoked. She has never used smokeless tobacco. She reports that she does not drink alcohol or use drugs.    MEDICATIONS:  Current Outpatient Medications   Medication Sig Dispense Refill     acetaminophen (TYLENOL) 325 MG tablet Take 650 mg by mouth every 6 hours as needed for pain        apixaban ANTICOAGULANT (ELIQUIS) 5 MG tablet Take 1 tablet (5 mg) by mouth 2 times daily Start on 8/24/20 60 tablet 0     atorvastatin (LIPITOR) 10 MG tablet Take 1 tablet (10 mg) by mouth daily 30 tablet 1     cholecalciferol 1000 UNITS TABS Take 1,000 Units by mouth daily 30 tablet 1     folic acid (FOLVITE) 1 MG tablet Take 1 tablet (1 mg) by mouth daily 30 tablet 0     levETIRAcetam (KEPPRA) 100 MG/ML solution Take 1.25 mLs (125 mg) by mouth 2 times daily       levothyroxine (SYNTHROID/LEVOTHROID) 75 MCG tablet Take 1 tablet (75 mcg) by mouth daily 30 tablet 0     lisinopril (PRINIVIL/ZESTRIL) 10 MG tablet Take 10 mg by mouth daily       melatonin 1 MG TABS tablet Take 1 mg by mouth nightly as needed for sleep        metoprolol tartrate (LOPRESSOR) 12.5 mg TABS half-tab Take 12.5 mg by mouth 2 times daily       nystatin (MYCOSTATIN) 413855 UNIT/GM external powder Apply topically 2 times daily as needed (intertriginal dermatitis)       potassium chloride ER (KLOR-CON M) 20 MEQ CR tablet Take 20 mEq by mouth once with dinner.       senna-docusate (SENOKOT-S/PERICOLACE) 8.6-50 MG tablet Take 1 tablet by mouth 2 times daily 60 tablet 0         Case Management:  I have reviewed the care plan and MDS and do agree with the plan. Patient's desire to return to the community is not assessible due to cognitive impairment. Information reviewed:  Medications, vital signs, orders, and  nursing notes.    ROS:  Unobtainable secondary to cognitive impairment.     Vitals:  /80   Pulse 71   Temp 97.5  F (36.4  C)   Resp 18   SpO2 94%   There is no height or weight on file to calculate BMI.  Exam:  GENERAL APPEARANCE:  in no distress, calm, alert, oriented to self  ENT:  very [place], dry mucous membranes moist  EYES:  conjunctiva and lids normal  RESP:  lungs clear to auscultation, no respiratory distress  CV:  RRR, murmur  ABDOMEN:  bowel sounds normal, soft, non-tender, no distension  M/S:   decreased muscle tone, no edema   PSYCH:  insight and judgement impaired, memory impaired, affect and mood normal    Lab/Diagnostic data:     Most Recent 3 CBC's:  Recent Labs   Lab Test 08/19/20  0905 08/18/20  1458 08/15/20  0601   WBC 7.9 7.1 6.8   HGB 14.2 13.2 13.3   MCV 96 96 93    190 158     Most Recent 3 BMP's:  Recent Labs   Lab Test 08/20/20  0925 08/19/20  0905 08/18/20  1458 08/16/20  0941 08/16/20  0941 08/14/20  0856 08/14/20  0856   NA  --  141 140  --   --   --  142   POTASSIUM  --  4.0 4.0  --  4.0   < > 3.3*   CHLORIDE  --  112* 111*  --   --   --  112*   CO2  --  24 27  --   --   --  26   BUN  --  7 5*  --   --   --  4*   CR 0.69 0.57 0.63   < >  --   --  0.42*   ANIONGAP  --  5 2*  --   --   --  4   JAZLYN  --  8.7 9.3  --   --   --  8.5   GLC  --  116* 104*  --   --   --  107*    < > = values in this interval not displayed.       ASSESSMENT/PLAN  (G48.796) Seizure disorder (H)  Comment: Neurology appt yesterday, family now in agreement to continue low-dose Keppra  Plan:  --continue Keppra 125 mg PO BID  --monitor for seizure-like activity   --follow up with Neurology in 2 months     (I62.03) Chronic subdural hematoma (H)  (G91.2) Idiopathic normal pressure hydrocephalus (INPH): Shunt 7/2013  (F03.90) Dementia without behavioral disturbance, unspecified dementia type (H)  Comment: followed by Neurology & Neurosurg, no behavioral concerns, has been more alert on lower Keppra dose    Plan:   --continue Keppra as noted above   --assist with ADLs, transfers, meals, meds  --follow with Neurology   --monitor for change in mood/behavior     (I10) Essential hypertension  Comment: chronic, some lower BPs  Plan:   --decrease lisinopril to 5 mg PO daily   --vitals per facility policy: at least weekly   --follow BMP    Orders written by provider at facility  1. Decrease lisinopril to 5 mg PO daily       Electronically signed by:  Susu Pereira NP

## 2021-05-27 NOTE — TELEPHONE ENCOUNTER
Patient Returning Call  Reason for call:  Returning call from clinic  Information relayed to patient:  Message below.   Patient has additional questions:  Yes  If YES, what are your questions/concerns:  Patient already scheduled for a physical and med check in August which is PCP's soonest. Is this date okay, or does PCP want to fit her in sooner? Please advise, thank you.   Okay to leave a detailed message?: Yes

## 2021-05-27 NOTE — TELEPHONE ENCOUNTER
RN cannot approve Refill Request    RN can NOT refill this medication PCP messaged that patient is overdue for Labs and Office Visit.       Linda Shaw, [facility] Triage/Med Refill 4/1/2019    Requested Prescriptions   Pending Prescriptions Disp Refills     levothyroxine (SYNTHROID, LEVOTHROID) 150 MCG tablet 90 tablet 0     Sig: TAKE 1 TABLET BY MOUTH EVERY DAY AT 6AM AS DIRECTED    Thyroid Hormones Protocol Failed - 4/1/2019  2:35 PM       Failed - Provider visit in past 12 months or next 3 months    Last office visit with prescriber/PCP: 11/23/2015 Ellen Kelsey MD OR same dept: Visit date not found OR same specialty: 3/2/2018 Latricia Ly MD  Last physical: Visit date not found Last MTM visit: Visit date not found   Next visit within 3 mo: Visit date not found  Next physical within 3 mo: Visit date not found  Prescriber OR PCP: Ellen Kelsey MD  Last diagnosis associated with med order: 1. Hypothyroidism  - levothyroxine (SYNTHROID, LEVOTHROID) 150 MCG tablet; TAKE 1 TABLET BY MOUTH EVERY DAY AT 6AM AS DIRECTED  Dispense: 90 tablet; Refill: 0    If protocol passes may refill for 12 months if within 3 months of last provider visit (or a total of 15 months).            Failed - TSH on file in past 12 months for patient age 12 & older    TSH   Date Value Ref Range Status   03/02/2018 0.31 0.30 - 5.00 uIU/mL Final

## 2021-05-27 NOTE — TELEPHONE ENCOUNTER
Refill Request  Did you contact pharmacy: Yes  Medication name:   Refill of all medications  Who prescribed the medication: PCP  Pharmacy Name and Location: [facility] in [place] on [address]  Is patient out of medication: Yes  Patient notified refills processed in 72 hours:  yes  Okay to leave a detailed message: yes

## 2021-05-27 NOTE — TELEPHONE ENCOUNTER
RN cannot approve Refill Request    RN can NOT refill this medication PCP messaged that patient is overdue for Labs and Office Visit.       Linda Shaw, [facility] Triage/Med Refill 2019    Requested Prescriptions   Pending Prescriptions Disp Refills     fluticasone propionate (FLONASE) 50 mcg/actuation nasal spray 16 g 3     Si spray into each nostril daily.       Nasal Steroid Refill Protocol Passed - 2019  2:02 PM        Passed - Patient has had office visit/physical in last 2 years     Last office visit with prescriber/PCP: Visit date not found OR same dept: Visit date not found OR same specialty: 3/2/2018 Latricia Ly MD Last physical: Visit date not found Last MTM visit: Visit date not found    Next appt within 3 mo: Visit date not found  Next physical within 3 mo: Visit date not found  Prescriber OR PCP: Ellen Kelsey MD  Last diagnosis associated with med order: 1. Nasal congestion  - fluticasone propionate (FLONASE) 50 mcg/actuation nasal spray; 1 spray into each nostril daily.  Dispense: 16 g; Refill: 3    2. Plugged feeling in ear, right  - fluticasone propionate (FLONASE) 50 mcg/actuation nasal spray; 1 spray into each nostril daily.  Dispense: 16 g; Refill: 3    3. Diabetes mellitus type 2 in obese (H)  - metFORMIN (GLUCOPHAGE) 500 MG tablet; Take 2 tablets (1,000 mg total) by mouth 2 (two) times a day with meals.  Dispense: 120 tablet; Refill: 3  - aspirin 81 MG EC tablet; Take 1 tablet (81 mg total) by mouth daily.  Dispense: 150 tablet; Refill: 2    4. Flexural eczema  - triamcinolone (KENALOG) 0.1 % cream; Apply topically 2 (two) times a day. To affected areas on hands/arms  Dispense: 45 g; Refill: 0    5. Hyperlipidemia  - lovastatin (MEVACOR) 40 MG tablet; Take 1 tablet (40 mg total) by mouth daily.  Dispense: 90 tablet; Refill: 0     If protocol passes may refill for 12 months if within 3 months of last provider visit (or a total of 15 months).               metFORMIN (GLUCOPHAGE) 500 MG tablet 120 tablet 3     Sig: Take 2 tablets (1,000 mg total) by mouth 2 (two) times a day with meals.       Metformin Refill Protocol Failed - 4/11/2019  2:02 PM        Failed - Blood pressure in last 12 months     BP Readings from Last 1 Encounters:   03/02/18 122/86             Failed - LFT or AST or ALT in last 12 months     Albumin   Date Value Ref Range Status   03/02/2018 3.7 3.5 - 5.0 g/dL Final     Bilirubin, Total   Date Value Ref Range Status   03/02/2018 0.5 0.0 - 1.0 mg/dL Final     Bilirubin, Direct   Date Value Ref Range Status   02/11/2014 0.1 <0.6 mg/dL Final     Alkaline Phosphatase   Date Value Ref Range Status   03/02/2018 98 45 - 120 U/L Final     AST   Date Value Ref Range Status   03/02/2018 68 (H) 0 - 40 U/L Final     ALT   Date Value Ref Range Status   03/02/2018 118 (H) 0 - 45 U/L Final     Protein, Total   Date Value Ref Range Status   03/02/2018 7.2 6.0 - 8.0 g/dL Final                Failed - GFR or Serum Creatinine in last 6 months     GFR MDRD Non Af Amer   Date Value Ref Range Status   03/02/2018 >60 >60 mL/min/1.73m2 Final     GFR MDRD Af Amer   Date Value Ref Range Status   03/02/2018 >60 >60 mL/min/1.73m2 Final             Failed - Visit with PCP or prescribing provider visit in last 6 months or next 3 months     Last office visit with prescriber/PCP: Visit date not found OR same dept: Visit date not found OR same specialty: 3/2/2018 Latricia Ly MD Last physical: Visit date not found Last MTM visit: Visit date not found         Next appt within 3 mo: Visit date not found  Next physical within 3 mo: Visit date not found  Prescriber OR PCP: Ellen Kelsey MD  Last diagnosis associated with med order: 1. Nasal congestion  - fluticasone propionate (FLONASE) 50 mcg/actuation nasal spray; 1 spray into each nostril daily.  Dispense: 16 g; Refill: 3    2. Plugged feeling in ear, right  - fluticasone propionate (FLONASE) 50 mcg/actuation nasal spray;  1 spray into each nostril daily.  Dispense: 16 g; Refill: 3    3. Diabetes mellitus type 2 in obese (H)  - metFORMIN (GLUCOPHAGE) 500 MG tablet; Take 2 tablets (1,000 mg total) by mouth 2 (two) times a day with meals.  Dispense: 120 tablet; Refill: 3  - aspirin 81 MG EC tablet; Take 1 tablet (81 mg total) by mouth daily.  Dispense: 150 tablet; Refill: 2    4. Flexural eczema  - triamcinolone (KENALOG) 0.1 % cream; Apply topically 2 (two) times a day. To affected areas on hands/arms  Dispense: 45 g; Refill: 0    5. Hyperlipidemia  - lovastatin (MEVACOR) 40 MG tablet; Take 1 tablet (40 mg total) by mouth daily.  Dispense: 90 tablet; Refill: 0     If protocol passes may refill for 12 months if within 3 months of last provider visit (or a total of 15 months).           Failed - A1C in last 6 months     Hemoglobin A1c   Date Value Ref Range Status   03/02/2018 6.5 (H) 3.5 - 6.0 % Final               Failed - Microalbumin in last year      No results found for: MICROALBUR               triamcinolone (KENALOG) 0.1 % cream 45 g 0     Sig: Apply topically 2 (two) times a day. To affected areas on hands/arms       There is no refill protocol information for this order        lovastatin (MEVACOR) 40 MG tablet 90 tablet 0     Sig: Take 1 tablet (40 mg total) by mouth daily.       Statins Refill Protocol (Hmg CoA Reductase Inhibitors) Failed - 4/11/2019  2:02 PM        Failed - PCP or prescribing provider visit in past 12 months      Last office visit with prescriber/PCP: 11/23/2015 Ellen Kelsey MD OR same dept: Visit date not found OR same specialty: 3/2/2018 Latricia Ly MD  Last physical: Visit date not found Last MTM visit: Visit date not found   Next visit within 3 mo: Visit date not found  Next physical within 3 mo: Visit date not found  Prescriber OR PCP: Ellen Kelsey MD  Last diagnosis associated with med order: 1. Nasal congestion  - fluticasone propionate (FLONASE) 50 mcg/actuation nasal spray; 1  spray into each nostril daily.  Dispense: 16 g; Refill: 3    2. Plugged feeling in ear, right  - fluticasone propionate (FLONASE) 50 mcg/actuation nasal spray; 1 spray into each nostril daily.  Dispense: 16 g; Refill: 3    3. Diabetes mellitus type 2 in obese (H)  - metFORMIN (GLUCOPHAGE) 500 MG tablet; Take 2 tablets (1,000 mg total) by mouth 2 (two) times a day with meals.  Dispense: 120 tablet; Refill: 3  - aspirin 81 MG EC tablet; Take 1 tablet (81 mg total) by mouth daily.  Dispense: 150 tablet; Refill: 2    4. Flexural eczema  - triamcinolone (KENALOG) 0.1 % cream; Apply topically 2 (two) times a day. To affected areas on hands/arms  Dispense: 45 g; Refill: 0    5. Hyperlipidemia  - lovastatin (MEVACOR) 40 MG tablet; Take 1 tablet (40 mg total) by mouth daily.  Dispense: 90 tablet; Refill: 0    If protocol passes may refill for 12 months if within 3 months of last provider visit (or a total of 15 months).             aspirin 81 MG EC tablet 150 tablet 2     Sig: Take 1 tablet (81 mg total) by mouth daily.       Aspirin/Dipyridamole Refill Protocol Failed - 4/11/2019  2:02 PM        Failed - PCP or prescribing provider visit in past 12 months       Last office visit with prescriber/PCP: 11/23/2015 Ellen Kelsey MD OR same dept: Visit date not found OR same specialty: 3/2/2018 Latricia Ly MD  Last physical: Visit date not found Last MTM visit: Visit date not found    Next appt within 3 mo: Visit date not found Next physical within 3 mo: Visit date not found  Prescriber OR PCP: Ellen Kelsey MD  Last diagnosis associated with med order: 1. Nasal congestion  - fluticasone propionate (FLONASE) 50 mcg/actuation nasal spray; 1 spray into each nostril daily.  Dispense: 16 g; Refill: 3    2. Plugged feeling in ear, right  - fluticasone propionate (FLONASE) 50 mcg/actuation nasal spray; 1 spray into each nostril daily.  Dispense: 16 g; Refill: 3    3. Diabetes mellitus type 2 in obese (H)  - metFORMIN  (GLUCOPHAGE) 500 MG tablet; Take 2 tablets (1,000 mg total) by mouth 2 (two) times a day with meals.  Dispense: 120 tablet; Refill: 3  - aspirin 81 MG EC tablet; Take 1 tablet (81 mg total) by mouth daily.  Dispense: 150 tablet; Refill: 2    4. Flexural eczema  - triamcinolone (KENALOG) 0.1 % cream; Apply topically 2 (two) times a day. To affected areas on hands/arms  Dispense: 45 g; Refill: 0    5. Hyperlipidemia  - lovastatin (MEVACOR) 40 MG tablet; Take 1 tablet (40 mg total) by mouth daily.  Dispense: 90 tablet; Refill: 0    If protocol passes may refill for 6 months if within 3 months of last provider visit (or a total of 9 months).

## 2021-05-28 ENCOUNTER — AMBULATORY - HEALTHEAST (OUTPATIENT)
Dept: NEUROSURGERY | Facility: CLINIC | Age: 56
End: 2021-05-28

## 2021-05-28 DIAGNOSIS — M54.9 BACK PAIN: ICD-10-CM

## 2021-05-30 ENCOUNTER — RECORDS - HEALTHEAST (OUTPATIENT)
Dept: ADMINISTRATIVE | Facility: CLINIC | Age: 56
End: 2021-05-30

## 2021-05-30 VITALS — WEIGHT: 293 LBS | BODY MASS INDEX: 48.71 KG/M2

## 2021-05-30 NOTE — TELEPHONE ENCOUNTER
RN cannot approve Refill Request    RN can NOT refill this medication PCP messaged that patient is overdue for Labs and Office Visit. Last office visit: 11/23/2015 Ellen Kelsey MD Last Physical: Visit date not found Last MTM visit: Visit date not found Last visit same specialty: 3/2/2018 Latricia Ly MD.  Next visit within 3 mo: Visit date not found  Next physical within 3 mo: Visit date not found      Ron Amezcua, [facility] Triage/Med Refill 7/7/2019    Requested Prescriptions   Pending Prescriptions Disp Refills     levothyroxine (SYNTHROID, LEVOTHROID) 150 MCG tablet [Pharmacy Med Name: LEVOTHYROXINE 150 MCG TABLET] 87 tablet 1     Sig: TAKE 1 TABLET BY MOUTH EVERY DAY AT 6AM AS DIRECTED       Thyroid Hormones Protocol Failed - 7/7/2019  3:58 PM        Failed - Provider visit in past 12 months or next 3 months     Last office visit with prescriber/PCP: 11/23/2015 Ellen Kelsey MD OR same dept: Visit date not found OR same specialty: 3/2/2018 Latricia Ly MD  Last physical: Visit date not found Last MTM visit: Visit date not found   Next visit within 3 mo: Visit date not found  Next physical within 3 mo: Visit date not found  Prescriber OR PCP: Ellen Kelsey MD  Last diagnosis associated with med order: 1. Hypothyroidism  - levothyroxine (SYNTHROID, LEVOTHROID) 150 MCG tablet [Pharmacy Med Name: LEVOTHYROXINE 150 MCG TABLET]; TAKE 1 TABLET BY MOUTH EVERY DAY AT 6AM AS DIRECTED  Dispense: 87 tablet; Refill: 1    If protocol passes may refill for 12 months if within 3 months of last provider visit (or a total of 15 months).             Failed - TSH on file in past 12 months for patient age 12 & older     TSH   Date Value Ref Range Status   03/02/2018 0.31 0.30 - 5.00 uIU/mL Final

## 2021-05-31 VITALS — BODY MASS INDEX: 48.51 KG/M2 | WEIGHT: 293 LBS

## 2021-05-31 VITALS — WEIGHT: 293 LBS | BODY MASS INDEX: 47.81 KG/M2

## 2021-05-31 NOTE — PROGRESS NOTES
Assessment/Plan:    Patient is a 54 y.o. female here for physical exam. See health maintenance section below. Labs as ordered.      1. Routine general medical examination at a health care facility  Gynecologic Cytology (PAP Smear)   2. Hyperlipidemia, unspecified hyperlipidemia type  Lipid Cascade   3. Hypothyroidism, unspecified type  Thyroid Cascade   4. Diabetes mellitus type 2 in obese (H)  Comprehensive Metabolic Panel    Glycosylated Hemoglobin A1c    Microalbumin, Random Urine    HM2(CBC w/o Differential)    Ambulatory referral to Diabetes Education Program(New Diagnosis)   5. Visit for screening mammogram  Mammo Screening Bilateral   6. Menopause  DXA Bone Density Scan   7. Screening for colon cancer  Ambulatory referral for Colonoscopy   8. Flexural eczema  triamcinolone (KENALOG) 0.1 % cream   9. Hypothyroidism  levothyroxine (SYNTHROID, LEVOTHROID) 150 MCG tablet   10. Hyperlipidemia  lovastatin (MEVACOR) 40 MG tablet      Restart aspirin 81 mg daily to reduce risk of heart attack or stroke.      Check hemoglobin A1c and urine microalbumin today. If her A1c is at or greater than 6.5%, we will restart metformin  mg twice a day with meals. Foot exam was performed today. Her eye exam is due. Referral to an eye doctor was offered which she declined as she has already established care with one.    Lovastatin 40 mg daily refilled today.     Thyroid cascade ordered. Levothyroxine 150 mcg daily refilled today.     Triamcinolone 0.1% cream refilled, apply twice a day as needed for rash.     She will return for a 20 minute follow up visit for a shave biopsy of the skin lesion, and we will send to Pathology. Referral for dermatology consultation was offered.    Ear lavage performed bilaterally today. Instructions were given for applying cotton balls with alcohol to the bilateral ears to help dry them out and prevent swimmer's ear.     Definition of menopause is 1 year without menstruation. She has not had a  period for several years. Precautions were given to monitor for any abnormal postmenopausal bleeding.    DEXA is due and ordered today.     Check routine labs of CMP, CBC, and lipid cascade today.      Mammogram ordered today. Instructions were given for regular self breast exams at home.    Pap smear done today.     HIV screen declined.    Tetanus due in 2020. Flu shot due this fall.    Diabetic education referral.    HPI    Chief Complaint   Patient presents with     Annual Exam     Patient not fasting        Health Maintenance   Topic Date Due     PREVENTIVE CARE VISIT  Yearly     DIABETES FOLLOW-UP  Every 6 months     DIABETES FOOT EXAM  Yearly     DIABETES OPHTHALMOLOGY EXAM  Yearly, due now     DIABETES URINE MICROALBUMIN  Yearly, done today     HIV SCREENING  Declined     COLONOSCOPY  Due now, ordered     DIABETES HEMOGLOBIN A1C  Today and every 6 months     MAMMOGRAM  Due     INFLUENZA VACCINE RULE BASED (1) Due in the fall     TD 18+ HE  07/30/2020     ADVANCE CARE PLANNING  We would like a copy please     PAP SMEAR  Today     TDAP ADULT ONE TIME DOSE  Completed     Dexa scan-Ordered    Patient Active Problem List   Diagnosis     Morbid Obesity     Allergies     Verruca Warts     Abdominal Pain     Hypothyroidism     Hyperlipidemia     Blood In Urine     Prediabetes     Adult Sleep Apnea     Recurrent sinus infections     Diabetes mellitus type 2 in obese (H)     Current Outpatient Medications   Medication Sig     aspirin 81 MG EC tablet Take 81 mg by mouth daily.     levothyroxine (SYNTHROID, LEVOTHROID) 150 MCG tablet Take 1 tablet (150 mcg total) by mouth Daily at 6:00 am.     lovastatin (MEVACOR) 40 MG tablet Take 1 tablet (40 mg total) by mouth daily.     triamcinolone (KENALOG) 0.1 % cream Apply topically 2 (two) times a day. To affected areas on hands/arms       Patient is a 54 y.o. female presents for a physical exam.    Domestic abuse screen: She denies any concerns with domestic  abuse.    Hyperlipidemia: The patient continues on lovastatin, and would like this refilled. She previously did not tolerate simvastatin due to stomach upset.    Type 2 diabetes: Aby has been managing her diabetes with diabetic diet and lifestyle modifications. She walks her dog every day, and tries to keep physically active. The patient does not recall having diabetic education in the past. Her last hemoglobin A1c in March 2018 was 6.5. Her blood pressure is well-controlled today. Her last diabetic eye exam was a year and a half ago. She has established care with an eye doctor. Her foot exam is due today. There has been no chest pain, tingling in distal extremities, or ulcers on her lower extremities.    Hypothyroidism: The patient continues on levothyroxine 150 mcg daily. She would like this refilled.    Rash: She has a recurrent rash on her right wrist that has been managed well with triamcinolone cream for management, and would like this refilled.    Mole. Patient has a mole on her upper abdomen that she would like evaluated. It used to be a light tan, and now has become darker around the edges. There is no family history of skin cancer.    Ear concerns: Aby is concerned about ear wax or sand in her ears, and would like them checked today. Her right ear is bothering her more than the left ear.    Menopause: Her last menstrual period was several years ago. There has been no abnormal postmenopausal bleeding.    Healthcare maintenance: The patient has not come in fasting today. Her mammogram, pap smear, colonoscopy, and DEXA scan are due. She denies history of abnormal pap smears. Tetanus will be due in 2020. Flu shot will be due this fall.     The following portions of the patient's history were reviewed and updated as appropriate: allergies, current medications, past family history, past medical history, past social history and problem list.    Review of Systems  Review of systems negative excepted as noted  "above or in the HPI.     Immunization History   Administered Date(s) Administered     Hep A, historic 07/30/2010, 02/21/2011     Hep B, historic 07/30/2010, 08/31/2010, 02/21/2011     Influenza, seasonal,quad inj 6-35 mos 11/01/2010     Influenza,seasonal quad, PF, 36+MOS 03/02/2018     Tdap 07/30/2010     Recent Results (from the past 240 hour(s))   Glycosylated Hemoglobin A1c   Result Value Ref Range    Hemoglobin A1c 6.4 (H) 3.5 - 6.0 %   Microalbumin, Random Urine   Result Value Ref Range    Microalbumin, Random Urine 1.81 0.00 - 1.99 mg/dL    Creatinine, Urine 204.0 mg/dL    Microalbumin/Creatinine Ratio Random Urine 8.9 <=19.9 mg/g   HM2(CBC w/o Differential)   Result Value Ref Range    WBC 7.5 4.0 - 11.0 thou/uL    RBC 4.62 3.80 - 5.40 mill/uL    Hemoglobin 13.8 12.0 - 16.0 g/dL    Hematocrit 41.7 35.0 - 47.0 %    MCV 90 80 - 100 fL    MCH 29.9 27.0 - 34.0 pg    MCHC 33.1 32.0 - 36.0 g/dL    RDW 11.6 11.0 - 14.5 %    Platelets 280 140 - 440 thou/uL    MPV 8.1 7.0 - 10.0 fL     I have had an Advance Directives discussion with the patient.       Objective:    /76 (Patient Site: Left Arm, Patient Position: Sitting, Cuff Size: Adult Large)   Pulse 90   Temp 97.2  F (36.2  C) (Oral)   Resp 16   Ht 5' 6.34\" (1.685 m)   Wt (!) 309 lb 12.8 oz (140.5 kg)   LMP  (Approximate)   Breastfeeding? No   BMI 49.49 kg/m      General appearance: alert, appears stated age and cooperative  Head: Normocephalic, without obvious abnormality, atraumatic  Eyes: conjunctivae/corneas clear. PERRL, EOM's intact. Fundi benign.  Ears: Normal TMs, cerumen and sand noted bilaterally. Ear lavage performed bilaterally.   Nose: Nares normal. Septum midline. Mucosa normal. No drainage or sinus tenderness.  Throat: lips, mucosa, and tongue normal; teeth and gums normal  Neck: no adenopathy, no carotid bruit, no JVD, supple, symmetrical, trachea midline and thyroid not enlarged, symmetric, no tenderness/mass/nodules  Lungs: clear to " auscultation bilaterally  Breasts: normal appearance, no masses or tenderness  Heart: regular rate and rhythm, S1, S2 normal, no murmur, click, rub or gallop  Abdomen: soft, non-tender; bowel sounds normal; no masses,  no organomegaly  Pelvic: cervix normal in appearance, external genitalia normal, no cervical motion tenderness, rectovaginal septum normal and vagina normal without discharge, unable to palpate uterus and ovaries secondary to body habitus  Extremities: extremities normal, atraumatic, no cyanosis or edema. Sensory exam of the foot is normal, tested with the monofilament. Good pulses, no lesions or ulcers, good peripheral pulses.   Skin: Skin color, texture, turgor normal. No rashes. On her epigastric area, there is a 4 mm brown and tan papule with a 2 mm light tan center.  Lymph nodes: Cervical supraclavicular, and axillary nodes normal.  Neurologic: Grossly normal      I, Melanie Perez, am scribing for and in the presence of, Dr. Kelsey.    I, Dr. Kelsey, personally performed the services described in this documentation, as scribed by Melanie Perez in my presence, and it is both accurate and complete.

## 2021-05-31 NOTE — TELEPHONE ENCOUNTER
Left message to call back for: results  Information to relay to patient:  See note below from provider  Letter mailed

## 2021-05-31 NOTE — TELEPHONE ENCOUNTER
----- Message from Ellen Kelsey MD sent at 8/15/2019  7:52 PM CDT -----  Please call patient then please mail results after calling.  Normal pap, recheck in 3 years.  Blood sugar is elevated in range of diabetes.  A1c is also very close to range of diabetes.  I would recommend restarting the metformin  mg daily with breakfast.  I will send a script.  Schedule appointment in 6 months for medication check and we will recheck labs.  Let me know if you would like a referral to our diabetic educator here at our clinic.  One liver function is elevated but better than past check.  Cholesterol looks good, continue your cholesterol medication.  The rest of the labs are fine.

## 2021-05-31 NOTE — PATIENT INSTRUCTIONS - HE
Restart aspirin 81 mg daily to reduce risk of heart attack or stroke.      Check hemoglobin A1c and urine microalbumin today. If her A1c is at or greater than 6.5%, we will restart metformin  mg twice a day with meals. Foot exam was performed today. Her eye exam is due. Referral to an eye doctor was offered which she declined as she has already established care with one.    Lovastatin 40 mg daily refilled today.     Thyroid cascade ordered. Levothyroxine 150 mcg daily refilled today.     Triamcinolone 0.1% cream refilled, apply twice a day as needed for rash.     She will return for a 20 minute follow up visit for a shave biopsy of the skin lesion, and we will send to Pathology. Referral for dermatology consultation was offered.    Ear lavage performed bilaterally today. Instructions were given for applying cotton balls with alcohol to the bilateral ears to help dry them out and prevent swimmer's ear.     Definition of menopause is 1 year without menstruation. She has not had a period for several years. Precautions were given to monitor for any abnormal postmenopausal bleeding.    DEXA is due and ordered today.     Check routine labs of CMP, CBC, and lipid cascade today.      Mammogram ordered today. Instructions were given for regular self breast exams at home.    Pap smear done today.     HIV screen declined.    Tetanus due in 2020. Flu shot due this fall.    Diabetic education referral.      If you choose to use [facility] to send a provider a message please keep this very brief.  They should only be used for a follow-up questions to a previous appointment.  New concerns should addressed by a phone call to triage, E -visit or an office visit.  Certainly if it is an emergency call 911.  If there are labor related questions please call [facility] or [facility].  Thank-you.    Your lab results will be communicated to you via letter, [facility] message or phone call when they are all back.  Please refrain  from sending messages on one specific lab until they are all back.  Thank you.        Health Maintenance   Topic Date Due     PREVENTIVE CARE VISIT  Yearly     DIABETES FOLLOW-UP  Every 6 months     DIABETES FOOT EXAM  Yearly     DIABETES OPHTHALMOLOGY EXAM  Yearly, due now     DIABETES URINE MICROALBUMIN  Yearly, done today     HIV SCREENING  Declined     COLONOSCOPY  Due now, ordered     DIABETES HEMOGLOBIN A1C  Today and every 6 months     MAMMOGRAM  Due     INFLUENZA VACCINE RULE BASED (1) Due in the fall     TD 18+ HE  07/30/2020     ADVANCE CARE PLANNING  We would like a copy please     PAP SMEAR  Today     TDAP ADULT ONE TIME DOSE  Completed     Dexa scan-Ordered

## 2021-05-31 NOTE — TELEPHONE ENCOUNTER
Left message to call back for: results  Information to relay to patient:  See message below, letter was also sen ton 8/16/19.

## 2021-06-01 ENCOUNTER — RECORDS - HEALTHEAST (OUTPATIENT)
Dept: ADMINISTRATIVE | Facility: CLINIC | Age: 56
End: 2021-06-01

## 2021-06-01 VITALS — WEIGHT: 293 LBS | BODY MASS INDEX: 49.68 KG/M2

## 2021-06-03 VITALS — WEIGHT: 293 LBS | BODY MASS INDEX: 47.09 KG/M2 | HEIGHT: 66 IN

## 2021-06-04 VITALS — WEIGHT: 293 LBS | BODY MASS INDEX: 47.09 KG/M2 | HEIGHT: 66 IN

## 2021-06-05 VITALS — BODY MASS INDEX: 47.09 KG/M2 | WEIGHT: 293 LBS | HEIGHT: 66 IN

## 2021-06-07 ENCOUNTER — HOSPITAL ENCOUNTER (OUTPATIENT)
Dept: RADIOLOGY | Facility: HOSPITAL | Age: 56
Discharge: HOME | End: 2021-06-07
Attending: SURGERY
Payer: COMMERCIAL

## 2021-06-07 DIAGNOSIS — M54.9 BACK PAIN: ICD-10-CM

## 2021-06-09 ENCOUNTER — OFFICE VISIT - HEALTHEAST (OUTPATIENT)
Dept: NEUROSURGERY | Facility: CLINIC | Age: 56
End: 2021-06-09

## 2021-06-09 DIAGNOSIS — E66.01 MORBID OBESITY (H): ICD-10-CM

## 2021-06-09 ASSESSMENT — MIFFLIN-ST. JEOR: SCORE: 1998.28

## 2021-06-10 ENCOUNTER — COMMUNICATION - HEALTHEAST (OUTPATIENT)
Dept: SURGERY | Facility: CLINIC | Age: 56
End: 2021-06-10
Payer: COMMERCIAL

## 2021-06-10 NOTE — PROGRESS NOTES
SUBJECTIVE:   Aby Giraldo is a 55 y.o. female presenting with a chief complaint of   Chief Complaint   Patient presents with     Cerumen Impaction     Had an ear wash x2 weeks ago, nothing came out, still plugged     She states her right ear was 100% plugged and her left ear was 75% plugged with ear wax 2 weeks ago with her PCP and nothing came out with an ear wash. She developed right ear pain yesterday that is intermittent and moderate. No drainage, fever, cough, shortness of breath, congestion, sore throat, dizziness. She does use q-tips and swims frequently.     She would also like to review lab results from her PCP visit on 7/29/20.     Review of Systems   Constitutional: Negative.    HENT: Negative.    Eyes: Negative.    Respiratory: Negative.    Cardiovascular: Negative.    Skin: Negative.        Past Medical History:   Diagnosis Date     Abdominal pain      Blood in urine      Diabetes mellitus type 2 in obese (H)      HLD (hyperlipidemia)      Hypothyroidism      Morbid obesity (H)      Prediabetes      Recurrent sinus infections      Seasonal allergies      Sleep apnea in adult      Verruca warts (infectious)        Current Outpatient Medications   Medication Sig Dispense Refill     aspirin 81 MG EC tablet Take 1 tablet (81 mg total) by mouth daily. 150 tablet 2     cyclobenzaprine (FLEXERIL) 10 MG tablet Take 1 tablet (10 mg total) by mouth 3 (three) times a day as needed for muscle spasms. 30 tablet 3     ibuprofen (ADVIL ORAL) Take by mouth.       levothyroxine (SYNTHROID, LEVOTHROID) 150 MCG tablet Take 1 tablet (150 mcg total) by mouth Daily at 6:00 am. 90 tablet 3     lovastatin (MEVACOR) 40 MG tablet Take 1 tablet (40 mg total) by mouth daily. 90 tablet 3     triamcinolone (KENALOG) 0.1 % cream Apply topically 2 (two) times a day. To affected areas on hands/arms 45 g 3     No current facility-administered medications for this visit.      OBJECTIVE  BP (!) 155/100   Pulse (!) 104   Temp 98  F  (36.7  C) (Oral)   Resp 18   SpO2 98%     Physical Exam  Vitals signs and nursing note reviewed.   Constitutional:       General: She is not in acute distress.     Appearance: Normal appearance. She is not toxic-appearing.   HENT:      Head: Normocephalic and atraumatic.      Right Ear: Tympanic membrane, ear canal and external ear normal. No drainage, swelling or tenderness. There is no impacted cerumen. No mastoid tenderness. Tympanic membrane is not erythematous, retracted or bulging.      Left Ear: Tympanic membrane, ear canal and external ear normal. No drainage, swelling or tenderness. There is no impacted cerumen. No mastoid tenderness. Tympanic membrane is not erythematous, retracted or bulging.      Nose: Nose normal. No congestion or rhinorrhea.      Right Sinus: No maxillary sinus tenderness or frontal sinus tenderness.      Left Sinus: No maxillary sinus tenderness or frontal sinus tenderness.      Mouth/Throat:      Mouth: Mucous membranes are moist.      Pharynx: Oropharynx is clear. No oropharyngeal exudate or posterior oropharyngeal erythema.   Eyes:      Extraocular Movements: Extraocular movements intact.      Pupils: Pupils are equal, round, and reactive to light.   Neck:      Musculoskeletal: Neck supple.   Cardiovascular:      Rate and Rhythm: Regular rhythm.      Heart sounds: Normal heart sounds.   Pulmonary:      Effort: Pulmonary effort is normal. No respiratory distress.      Breath sounds: Normal breath sounds. No wheezing, rhonchi or rales.   Lymphadenopathy:      Cervical: No cervical adenopathy.   Skin:     General: Skin is warm and dry.   Neurological:      Mental Status: She is alert.       ASSESSMENT:      ICD-10-CM    1. Non-recurrent acute serous otitis media of right ear  H65.01    2. Elevated blood pressure reading without diagnosis of hypertension  R03.0         PLAN:    Reassured patient with negative exam findings today without cerumen impaction or infection. Patient was  educated on the natural course of condition which typically resolves on its own. Usually takes around 2 weeks to clear, but some cases can take up to 8 weeks. Conservative measures discussed including over-the-counter Flonase and Sudafed and she declines a flonase prescription. May take tylenol as needed for pain. Recommended not putting anything including q-tips in ears. May use debrox drops over the counter for earwax in the future. See your primary care provider if symptoms do not improve in 2 weeks, to discuss lab results ordered by PCP, and for follow-up of elevated blood pressure. Seek emergency care if you develop severe ear pain, swelling, or redness.     AVS reviewed with patient who verbalized understanding and had no further questions.     Joy Quezada NP

## 2021-06-10 NOTE — PROGRESS NOTES
ASSESSMENT: Aby Giraldo is a 55 y.o. female with past medical history significant for morbid obesity, hypothyroidism, hyperlipidemia, type 2 diabetes mellitus, sleep apnea who presents today for new patient evaluation of a 9-month history of pain involving the low back, hips, and legs.  Symptoms appear to be multifactorial.  I believe she is primarily symptomatic from moderate left hip osteoarthritis.  Pain began in the left hip and she has significantly restricted range of motion and reproduction of her pain with range of motion testing of the left hip.  Patient appears to be secondarily symptomatic from bilateral L5 radiculitis.  She does have bilateral lateral recess stenosis at L4-5 related to trace spondylolisthesis secondary to severe bilateral facet arthropathy where she may be trapping the bilateral L5 nerve roots.  Finally, she appears to be symptomatic from lower lumbar facet arthropathy/mild SI joint degenerative change.  Patient was neurologically intact.    TAO:38  Who 5:18    PLAN:  A shared decision making model was used.  The patient's values and choices were respected.  The following represents what was discussed and decided upon by the physician assistant and the patient.      1.  DIAGNOSTIC TESTS: I reviewed the MRI lumbar spine and MRI sacrum.  No further diagnostic tests were ordered.    2.  PHYSICAL THERAPY:  Discussed the importance of core strengthening, ROM, stretching exercises with the patient and how each of these entities is important in decreasing pain.  Explained to the patient that the purpose of physical therapy is to teach the patient a home exercise program.  These exercises need to be performed every day in order to decrease pain and prevent future occurrences of pain.  I entered a referral for physical therapy.    3.  MEDICATIONS:   -Patient will continue Flexeril 10 mg as needed.  -Patient is also using ibuprofen 400 mg 3 times daily.  Hopefully she will be able to decrease her  use of this medication.  -I offered for the patient to trial gabapentin.  She declined due to concern for side effects of drowsiness and dizziness since patient works as an  and has a big trial coming up.    4.  INTERVENTIONS:   -I offer the patient a left intra-articular hip joint injection under ultrasound guidance.  Patient indicated she would like to proceed and an order was placed.  -If that helps the left side but she continues have significant pain on the right, we could repeat this procedure on the right.  - If patient continues to have L5 radicular pain, would recommend a bilateral L4-5 transforaminal epidural steroid injection.  -If that did not help, would recommend a bilateral L5-S1 transforaminal epidural steroid injection.  - If axial low back pain persist, I would recommend bilateral L3, L4, L5 medial branch blocks to determine if the pain is facet mediated.  -If she had a positive response to the medial branch blocks, we could try bilateral L4-5, L5-S1 facet joint injections.  -If she fails the medial branch blocks, we could try bilateral sacroiliac joint injections.    5.  PATIENT EDUCATION: Patient is in agreement the above plan.  All questions were answered.  -I told the patient that if left hip pain fails to improve, would refer her to orthopedic surgery.  -Patient was certainly benefit from weight loss.  We did not discuss this at today's visit, but I plan to do so at future visits.    6.  FOLLOW-UP:   I will see the patient back in the clinic for a 2-week post procedure follow-up after her left intra-articular hip joint injection.  If she has questions or concerns in the meantime, she should not hesitate to call..        SUBJECTIVE:  Aby Giraldo  Is a 55 y.o. female who presents today in consultation at request of Dr. Kelsey for new patient evaluation of pain involving the low back, hips, and lower extremities.  Patient reports that she began to have pain in December 2019.  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  In December she began to feel sore which she thought was related to the physical work she had been doing.  However, it worsened and in January she could not sleep due to pain in the left hip.  She saw her chiropractor and was given exercises, TENS unit, and she was getting treatments regularly.  She thought the hip was getting better, but she reported once that started feeling better she began to experience different pain radiating all the way down the legs to the feet.  She had to stop going to the chiropractor due to COVID-19 and her pain worsened.  Since things have opened back up she has gone back to the chiropractor but she does not feel like she is making any progress.  She has only temporary improvement in her symptoms with the treatments.  She complained about this to her primary care provider and an MRI lumbar spine and MRI sacrum was obtained.  She is referred to our clinic for further evaluation and treatment.    Patient complains of bilateral low back pain.  Pain spans across the low back at the lumbosacral junction.  She then complains of pain in the bilateral hips.  Patient points to the lateral hips when talking about her hip pain.  The left hip is more painful than the right.  Pain extends into the groin and proximal anterior thighs.  She then also complains of pain that radiates down the lateral thighs, into the anterolateral shins, extending into the dorsal feet, and ending in the big toes bilaterally.  She describes the pain as feeling like a very tight muscle.   She has intermittent tingling in the great toes bilaterally.  She has intermittent weakness in her legs.  She states that this was more severe in early 2020.  She states it felt like her quads were made of molten lead.  Symptoms are aggravated with squatting, ascending stairs, and walking.  She states that her walking is limited to 200  yards.  This is upsetting to her because she has had to limit her walks with her dog.  Pain is alleviated with applying heat, TENS unit, sleeping with a pillow between her legs, taking hot baths, walking in the leg.  She denies any loss of bowel or bladder control.  Denies any recent fevers.    As mentioned above, patient has been going to the chiropractor since January 2020.  The chiropractor does ultrasound treatments, TENS unit, and adjustments.  She also gets massage once every 1 to 2 weeks.  These treatments all provide temporary relief of her pain, typically lasting 1 to 2 days.  She has been giving a home exercise program which she does regularly.  She has not had formal physical therapy.  She has never had any spine or hip surgeries or injections.  She uses Flexeril 10 mg once or twice daily which helps quite a bit.  This does cause drowsiness.  She also uses Advil 400 mg 3 times daily which is helpful.    Current Outpatient Medications on File Prior to Visit   Medication Sig Dispense Refill     aspirin 81 MG EC tablet Take 1 tablet (81 mg total) by mouth daily. 150 tablet 2     cyclobenzaprine (FLEXERIL) 10 MG tablet Take 1 tablet (10 mg total) by mouth 3 (three) times a day as needed for muscle spasms. 30 tablet 3     ibuprofen (ADVIL ORAL) Take by mouth.       levothyroxine (SYNTHROID, LEVOTHROID) 150 MCG tablet Take 1 tablet (150 mcg total) by mouth Daily at 6:00 am. 90 tablet 3     lovastatin (MEVACOR) 40 MG tablet Take 1 tablet (40 mg total) by mouth daily. 90 tablet 3     triamcinolone (KENALOG) 0.1 % cream Apply topically 2 (two) times a day. To affected areas on hands/arms 45 g 3       Allergies   Allergen Reactions     Simvastatin Diarrhea       Past Medical History:   Diagnosis Date     Abdominal pain      Blood in urine      Diabetes mellitus type 2 in obese (H)      HLD (hyperlipidemia)      Hypothyroidism      Morbid obesity (H)      Prediabetes      Recurrent sinus infections      Seasonal  allergies      Sleep apnea in adult      Verruca warts (infectious)       Patient Active Problem List   Diagnosis     Morbid Obesity     Allergies     Verruca Warts     Abdominal Pain     Hypothyroidism     Hyperlipidemia     Blood In Urine     Prediabetes     Adult Sleep Apnea     Recurrent sinus infections     Diabetes mellitus type 2 in obese (H)         Past Surgical History:   Procedure Laterality Date     AR REMOVAL GALLBLADDER      Description: Cholecystectomy;  Proc Date: 03/01/2010;       Family History   Problem Relation Age of Onset     Thyroid disease Mother      Asthma Mother      Hypertension Father      Heart disease Father      Alzheimer's disease Maternal Grandfather      Stroke Paternal Grandfather      Osteoporosis Neg Hx      Cancer Neg Hx      Other Neg Hx         Chemical dependency     Depression Neg Hx      Patient is single.  She is an  for a construction company.  Social history: She lives with her parents.  She drinks alcohol occasionally (not recently because she is on Flexeril).  She denies tobacco use.  Denies illicit drug use.      ROS: Positive for joint pain, muscle pain, muscle fatigue, sciatica, insomnia.  Specifically negative for bowel/bladder dysfunction, fevers,chills, appetite changes, unexplained weight loss.   Otherwise 13 systems reviewed are negative.  Please see the patient's intake questionnaire from today for details.      OBJECTIVE:  PHYSICAL EXAMINATION:    CONSTITUTIONAL:  Vital signs as above.  No acute distress.  The patient is well nourished and well groomed.  Patient is obese.  PSYCHIATRIC:  The patient is awake, alert, oriented to person, place, time and answering questions appropriately with clear speech.    HEENT: Normocephalic, atraumatic.  Sclera clear.  Neck is supple.  SKIN:  Skin over the face, bilateral lower extremities, and posterior torso is clean, dry, intact without rashes.    GAIT:  Gait is stiff and guarded.  She has Trendelenburg gait on  the left.  The patient is able to rise onto toes and heels bilaterally with support.  STANDING EXAMINATION: No significant tenderness palpation bilateral lower lumbar paraspinous muscles.  No significant tenderness palpation bilateral sacroiliac joints.  No significant tenderness palpation bilateral greater trochanters.  Lumbar flexion intact.  Lumbar extension moderately restricted with reproduction of low back pain.  MUSCLE STRENGTH:  The patient has 5/5 strength for the bilateral hip flexors, knee flexors/extensors, ankle dorsiflexors/plantar flexors, great toe extensors, ankle evertors/invertors.  NEUROLOGICAL: 1+ patellar, and achilles reflexes bilaterally.  Negative Babinski's bilaterally.  No ankle clonus bilaterally. Sensation to light touch is intact in the bilateral L4, L5, and S1 dermatomes.  VASCULAR:  2/4 dorsalis pedis pulses bilaterally.  Bilateral lower extremities are warm.  There is no pitting edema of the bilateral lower extremities.  ABDOMINAL:  Soft, non-distended, non-tender throughout all quadrants.  No pulsatile mass palpated in the left lower quadrant.  LYMPH NODES:  No palpable or tender inguinal lymph nodes.  MUSCULOSKELETAL: Straight leg raise positive bilaterally.  Range of motion of the right hip is mildly restricted with external rotation, moderately restricted with internal rotation with reproduction of right groin/lateral hip pain.  Range of motion of the left hip is moderately restricted with external rotation is severely restricted with internal rotation with reproduction of left lateral hip and groin pain.  Patient also has restriction with reproduction of hip pain with extension of the hip bilaterally.    RESULTS:    I reviewed the MRI lumbar spine from [facility] dated August 18, 2020.  At L4-5 there is a broad-based disc bulge and severe bilateral facet arthropathy with trace spondylolisthesis and mild spinal canal stenosis and mild bilateral lateral recess stenosis.  At L5-S1  there is a small broad-based disc bulge with moderate bilateral lateral recess stenosis.  At L3-4 there is also mild bilateral lateral recess stenosis related to a disc bulge and mild to moderate bilateral facet arthropathy.  MRI lumbar spine also shows moderate degenerative change left hip joint.    I reviewed the MRI sacrum from [facility] dated August 18, 2020.  This shows mild bilateral sacroiliac joint osteoarthritis.  No evidence of effusion, synovitis, erosive change, edema.  It also showed a probable exophytic fibroid posterior uterine wall with fluid and complex material present in the endometrium.    An ultrasound pelvis August 20, 2020 showed heterogeneous uterine endometrium and a heterogeneous 3.1 cm mass along the posterior margin of the uterus.  Patient has been referred to OB/GYN.

## 2021-06-10 NOTE — PATIENT INSTRUCTIONS - HE
Set physical at 8:40 Wed. Oct. 21.    Resume 81 mg Aspirin daily.    If A1C is elevated, we will restart Metformin  mg, once or twice daily depending on A1C.    Labs today.    Ear rinse today.    At home put a few drops of rubbing alcohol in each ear.    Flexeril, muscle relaxer, max 10 mg three times a day, but most people just use at bedtime as it can make you sleepy.     MRI of lumbar spine and sacrum ordered.    Spine clinic consult.

## 2021-06-10 NOTE — TELEPHONE ENCOUNTER
Who is calling:  The patient   Reason for Call:  The patient states she has been waiting to get a call to set up the MRI. The patient would like a call today to get scheduled for the MRI.   Date of last appointment with primary care: 7/29/20  Okay to leave a detailed message: Yes

## 2021-06-10 NOTE — TELEPHONE ENCOUNTER
----- Message from Ellen Kelsey MD sent at 8/20/2020  8:36 AM CDT -----  Please call patient.  Lumbar spine MRI does show degenerative changes of the intervertebral discs.  There are some discs protruding or bulging.  Some narrowing where the nerves come off of the spine.  Also some arthritis in the left hip.  I am glad you are going to see the spine clinic.

## 2021-06-10 NOTE — TELEPHONE ENCOUNTER
Patient is requesting more flexeril for her muscle spasms and pain. Patient is calling trying to get in for her MRIs this week but would like a refill of the muscle relaxants to help.

## 2021-06-10 NOTE — TELEPHONE ENCOUNTER
Patient Returning Call  Reason for call:  Results  Information relayed to patient:  The writer read the following to patient per Dr Kelsey: Lumbar spine MRI does show degenerative changes of the intervertebral discs.  There are some discs protruding or bulging.  Some narrowing where the nerves come off of the spine.  Also some arthritis in the left hip.  I am glad you are going to see the spine clinic.     Patient has additional questions:  Yes  If YES, what are your questions/concerns:  How does Aby get the US scheduled as instructed and how does she make the spine appointment?  The referral for spine is from July.  Writer left vm with speciality  to let patient know if that referral is still good and where does she go.  The appointment could not be done until now that she has results.  Writer transferred patient to radiology scheduler to make appointment for ultrasound.  BILL    Okay to leave a detailed message?: No

## 2021-06-10 NOTE — PROGRESS NOTES
Assessment:   1. Hypothyroidism, unspecified type  Thyroid [place]   2. Hyperlipidemia, unspecified hyperlipidemia type  Lipid [place]   3. Diabetes mellitus type 2 in obese (H)  Glycosylated Hemoglobin A1c    Comprehensive Metabolic Panel    HM2(CBC w/o Differential)    Vitamin D, Total (25-Hydroxy)   4. Chronic bilateral low back pain without sciatica  cyclobenzaprine (FLEXERIL) 10 MG tablet    MR Lumbar Spine Without Contrast    MR Sacrum Without Contrast    Ambulatory referral to Spine Care   5. Leg weakness, bilateral  cyclobenzaprine (FLEXERIL) 10 MG tablet    MR Lumbar Spine Without Contrast    MR Sacrum Without Contrast    Ambulatory referral to Spine Care   6. Bilateral impacted cerumen           Plan:   Set physical at 8:40 Wed. Oct. 21.    Resume 81 mg Aspirin daily.    If A1C is elevated, we will restart Metformin  mg, once or twice daily depending on A1C.    Labs today.    Ear rinse today.    At home put a few drops of rubbing alcohol in each ear.    Flexeril, muscle relaxer, max 10 mg three times a day, but most people just use at bedtime as it can make you sleepy.     MRI of lumbar spine and sacrum ordered.    Spine clinic consult.     Twenty-five minutes spent with this patient, at least 50% in coordination of care or counseling reguarding the topics discussed in note.    Subjective:  Chief Complaint   Patient presents with     Back Pain     low back pain shifts to diffrent areas of legs/groin/buttocks x 6 months       Aby Giraldo, a 55 y.o. year old, comes in to clinic for low back pain.    Low back pain: July 2019 she started a home renovation project where she was renovating her [place] on her own.  This went on until November 2019.  She did have back pain during that project but thought she was just sore from all the heavy lifting.  Symptoms increased in December 2019.  She seen her chiropractor and they thought she is having pelvic girdle symptoms.  She has had pain in her glutes, IT  "band and hips.  In January she could not sleep secondary to pain.  She thought possibly this was related to metformin so she went off of that in January.  She still had hip pain but slept better.  Through her chiropractor she does have a TENS unit and is doing home exercises.  She feels her hips have loosened up.  She feels her thighs feel like \"molten metal\" where they are feeling very wobbly and weak.  She does feel her arms and legs are weak but worse in the legs.  She is doing some ultrasound treatment and massage therapy at the chiropractor.  She denies pain going down into her legs, numbness tingling in her legs.  Again she does feel leg weakness.    Diabetes: Patient has not been in for diabetic check in some time.  She is off her metformin since January because she thought it was causing pain but the pain has persisted.  She is willing to restart that after we have blood work today.  Denies any chest pain or chest pressure.  No numbness tingling in her feet or foot ulcers.    Ear fullness: Patient is a history of cerumen impaction and would like that rinsed today.  No other concerns.    Start  Current Outpatient Medications   Medication Sig     ibuprofen (ADVIL ORAL) Take by mouth.     levothyroxine (SYNTHROID, LEVOTHROID) 150 MCG tablet Take 1 tablet (150 mcg total) by mouth Daily at 6:00 am.     lovastatin (MEVACOR) 40 MG tablet Take 1 tablet (40 mg total) by mouth daily.     cyclobenzaprine (FLEXERIL) 10 MG tablet Take 1 tablet (10 mg total) by mouth 3 (three) times a day as needed for muscle spasms.     triamcinolone (KENALOG) 0.1 % cream Apply topically 2 (two) times a day. To affected areas on hands/arms       Patient Active Problem List   Diagnosis     Morbid Obesity     Allergies     Verruca Warts     Abdominal Pain     Hypothyroidism     Hyperlipidemia     Blood In Urine     Prediabetes     Adult Sleep Apnea     Recurrent sinus infections     Diabetes mellitus type 2 in obese (H)         "     Objective:  /76 (Patient Site: Left Arm, Patient Position: Sitting, Cuff Size: Adult Large)   Pulse 83   Temp 97.1  F (36.2  C) (Oral)   Resp 16   Wt (!) 309 lb (140.2 kg)   BMI 49.37 kg/m    General: In moderate distress with walking  HEENT: Right ear canal 100% occluded by cerumen, left ear canal about 60% occluded  Neuro: Alert and oriented x 3, absent deep tendon reflexes lower extremities, patient has a lot of pain and stiffness when getting on and off the exam chair secondary to low back and buttock pain, negative straight leg raise bilaterally, walks with unsteady gait

## 2021-06-10 NOTE — TELEPHONE ENCOUNTER
----- Message from Ellen Kelsey MD sent at 8/20/2020  6:24 PM CDT -----  Please call patient.  The pelvic ultrasound does show a thickened lining of the uterus.  To be on the safe side I am recommending a [facility].  She can call [phone] to set that up.  Also what looks like a 3 cm fibroid which is a benign growth in the uterus.  They said this is a little bit of an atypical appearance and so this should be discussed with gynecology as well.  I will ask my staff to fax this report to [facility].    CA: Please fax this pelvic ultrasound to [facility], thank you.

## 2021-06-10 NOTE — TELEPHONE ENCOUNTER
Would like to discuss with Dr. Kelsey.    I advised that we did not have both MRIs back yet.  She is wondering if any was found on the sacrum MRI and would like to discuss labs.

## 2021-06-10 NOTE — TELEPHONE ENCOUNTER
Test Results  Who is calling?: Patient  Who ordered the test: Ellen Kelsey MD   Type of test: Lab and Imaging  Date of test:  07/29/2020 and 08/18/2020  Where was the test performed: [facility]  What are your questions/concerns?:  Requesting a return phone call to discuss labs and imaging results.  Okay to leave a detailed message?:  Yes

## 2021-06-11 NOTE — PATIENT INSTRUCTIONS - HE
DISCHARGE INSTRUCTIONS    During office hours (8:00 a.m.- 4:00 p.m.) questions or concerns may be answered  by calling [facility] Navigation Nurses at  [phone].  Messages received after hours will be returned the following business day.      In the case of an emergency, please dial 911 or seek assistance at the nearest Emergency Room/Urgent Care facility.     All Patients:    ? You may experience an increase in your symptoms for the first 2 days (It may take anywhere between 2 days- 2 weeks for the steroid to have maximum effect).    ? You may use ice on the injection site, as frequently as 20 minutes each hour if needed.    ? You may take your pain medicine.    ? You may continue taking your regular medication after your injection.    ? You may shower. No swimming, tub bath or hot tub for 48 hours.  You may remove your bandaid/bandage as soon as you are home.    ? You may resume light activities, as tolerated.    ? Resume your usual diet as tolerated.      POSSIBLE STEROID SIDE EFFECTS (If steroid/cortisone was used for your procedure)    -If you experience these symptoms, it should only last for a short period      Swelling of the legs                Skin redness (flushing)       Mouth (oral) irritation     Blood sugar (glucose) levels              Sweats                      Mood changes    Headache    Sleeplessness         POSSIBLE PROCEDURE SIDE EFFECTS  -Call the Spine Center if you are concerned    Increased Pain             Increased numbness/tingling        Nausea/Vomiting            Bruising/bleeding at site        Redness or swelling                                                Difficulty walking        Weakness             Fever greater than 100.5

## 2021-06-12 NOTE — PROGRESS NOTES
[facility]  [address] [facility] [address]  Dept: [phone]  Dept Fax: [phone]  Primary Provider: Ellen Kelsey MD  Pre-op Performing Provider: ELLEN KELSEY    PREOPERATIVE EVALUATION:  Today's date: 10/21/2020    Aby Giraldo is a 55 y.o. female who presents for a preoperative evaluation.    Surgical Information:  Surgery/Procedure: DNC  Surgery Location: [facility]  Surgeon: Dr Gonzalez  Surgery Date: 10/28/20  Time of Surgery: unknown  Where patient plans to recover: At home with family  Fax number for surgical facility: Note does not need to be faxed, will be available electronically in Epic.    Type of Anesthesia Anticipated: General    Subjective     HPI related to upcoming procedure: Patient had a lumbar spine MRI and found an abnormality in the uterus.  Sent for pelvic ultrasound and showed 16mm endometrium and a 3.1 cm mass.  Patient was referred to gynecology.  They did an endometrial biopsy.  We do not have those results but we will get them back patient was told they were abnormal.  Patient is now scheduled for a hysteroscopy and D&C.    Hip pain:  Seeing spine clinic and had left steroid injection and it helped.  She would like to have a right steroid injection.  They have also recommended physical therapy which she is agreeable to as well.    Fatigue: Patient is an  and just finished a 3 week trial, taking care of parents. Parents live with her.  She does all the meals and shopping.  Feel very fatigued.  Possible depression or anxiety.  She does feel she does not get enough sleep but even last night with 11 hours she does not wake up refreshed.    Sleep Apnea: This was diagnosed about 10 years ago.  In the noted did recommend CPAP but patient did not recall this.  She is not on CPAP but agreeable to follow-up with the sleep clinic.    New diagnosis of diabetes: Patient is feeling overwhelmed and would not like to see the  diabetic educator yet but will do that when she is recovered from surgery.  She said her exercise is limited because of hip pain but she is trying to work on that as well as healthy diet exercise and will not work on a low-carb diet.      Preop Questions 10/21/2020   Have you ever had a heart attack or stroke? No   Have you ever had surgery on your heart or blood vessels, such as a stent placement, a coronary artery bypass, or surgery on an artery in your head, neck, heart, or legs? No   Do you have chest pain with activity? No   Do you have a history of  heart failure? No   Do you currently have a cold, bronchitis or symptoms of other infection? No   Do you have a cough, shortness of breath, or wheezing? No   Do you or anyone in your family have previous history of blood clots? YES - sister had DVT   Do you or does anyone in your family have a serious bleeding problem such as prolonged bleeding following surgeries or cuts? No   Have you ever had problems with anemia or been told to take iron pills? No   Have you had any abnormal blood loss such as black, tarry or bloody stools, or abnormal vaginal bleeding? No   Have you ever had a blood transfusion? No   Are you willing to have a blood transfusion if it is medically needed before, during, or after your surgery? Yes   Have you or any of your relatives ever had problems with anesthesia? No   Do you have sleep apnea, excessive snoring or daytime drowsiness? No   Do you have any artifical heart valves or other implanted medical devices like a pacemaker, defibrillator, or continuous glucose monitor? No   Do you have artificial joints? No   Are you allergic to latex? No   Is there any chance that you may be pregnant? No     Health Care Directive:  Patient does not have a Health Care Directive or Living Will: Not on file    6723}  See problem list for active medical problems.  Problems all longstanding and stable, except as noted/documented.  See ROS for pertinent  symptoms related to these conditions.      Review of Systems  Constitutional, neuro, ENT, endocrine, pulmonary, cardiac, gastrointestinal, genitourinary, musculoskeletal, integument and psychiatric systems are negative, except as otherwise noted.      Patient Active Problem List    Diagnosis Date Noted     Endometrial hyperplasia with atypia 10/21/2020     Osteoarthritis of both hips 10/21/2020     DDD (degenerative disc disease), lumbar 10/21/2020     Diabetes mellitus type 2 in obese (H) 03/02/2018     Recurrent sinus infections 07/31/2016     Morbid Obesity      Allergies      Verruca Warts      Abdominal Pain      Hypothyroidism      Hyperlipidemia      Blood In Urine      Adult Sleep Apnea      Past Medical History:   Diagnosis Date     Abdominal pain      Blood in urine      Diabetes mellitus type 2 in obese (H)      HLD (hyperlipidemia)      Hypothyroidism      Morbid obesity (H)      Prediabetes      Recurrent sinus infections      Seasonal allergies      Sleep apnea in adult      Verruca warts (infectious)      Past Surgical History:   Procedure Laterality Date     OK REMOVAL GALLBLADDER      Description: Cholecystectomy;  Proc Date: 03/01/2010;     Current Outpatient Medications   Medication Sig Dispense Refill     aspirin 81 MG EC tablet Take 1 tablet (81 mg total) by mouth daily. 150 tablet 2     cyclobenzaprine (FLEXERIL) 10 MG tablet Take 1 tablet (10 mg total) by mouth 3 (three) times a day as needed for muscle spasms. 30 tablet 3     fexofenadine (ALLEGRA) 60 MG tablet Take 60 mg by mouth daily.       ibuprofen (ADVIL ORAL) Take by mouth.       levothyroxine (SYNTHROID, LEVOTHROID) 150 MCG tablet Take 1 tablet (150 mcg total) by mouth Daily at 6:00 am. 90 tablet 3     lovastatin (MEVACOR) 40 MG tablet Take 1 tablet (40 mg total) by mouth daily. 90 tablet 3     triamcinolone (KENALOG) 0.1 % cream Apply topically 2 (two) times a day. To affected areas on hands/arms 45 g 3     No current  "facility-administered medications for this visit.        Allergies   Allergen Reactions     Simvastatin Diarrhea       Social History     Tobacco Use     Smoking status: Never Smoker     Smokeless tobacco: Never Used   Substance Use Topics     Alcohol use: Yes     Frequency: 2-3 times a week      Family History   Problem Relation Age of Onset     Thyroid disease Mother      Asthma Mother      Hypertension Father      Heart disease Father      Alzheimer's disease Maternal Grandfather      Stroke Paternal Grandfather      Osteoporosis Neg Hx      Cancer Neg Hx      Other Neg Hx         Chemical dependency     Depression Neg Hx      Social History     Substance and Sexual Activity   Drug Use No        Objective     /77   Pulse 90   Temp (!) 94.1  F (34.5  C) (Oral)   Resp 16   Ht 5' 6.25\" (1.683 m)   Wt (!) 293 lb 4 oz (133 kg)   BMI 46.98 kg/m    Physical Exam    GENERAL APPEARANCE: healthy, alert and no distress     EYES: EOMI, PERRL     HENT: ear canals and TM's normal and nose and mouth without ulcers or lesions     NECK: no adenopathy, no asymmetry, masses, or scars and thyroid normal to palpation     RESP: lungs clear to auscultation - no rales, rhonchi or wheezes     BREAST: normal without masses, tenderness or nipple discharge and no palpable axillary masses or adenopathy     CV: regular rates and rhythm, normal S1 S2, no S3 or S4 and no murmur, click or rub     ABDOMEN:  soft, nontender, no HSM or masses and bowel sounds normal     MS: extremities normal- no gross deformities noted, no evidence of inflammation in joints, FROM in all extremities.     SKIN: no suspicious lesions or rashes     NEURO: Normal strength and tone, sensory exam grossly normal, mentation intact and speech normal     PSYCH: mentation appears normal. and affect normal/bright     LYMPHATICS: No cervical adenopathy    Recent Labs   Lab Test 10/21/20  1010 07/29/20  1504   HGB 15.3 14.6    321    140   K 4.6 4.3 "   CREATININE 1.05 0.93        PRE-OP Diagnostics:   Labs pending at this time. Results will be reviewed when available.  EKG:  Sinus tachycardia   Otherwise normal ECG   When compared with ECG of 09-MAR-2010 00:05,   Nonspecific T wave abnormality no longer evident in Lateral leads     REVISED CARDIAC RISK INDEX (RCRI)   The patient has the following serious cardiovascular risks for perioperative complications:   - No serious cardiac risks = 0 points    RCRI INTERPRETATION: 0 points: Class I (very low risk - 0.4% complication rate)         Assessment & Plan      The proposed surgical procedure is considered LOW risk.    1. Pre-op exam  Electrocardiogram Perform and Read   2. Diabetes mellitus type 2 in obese (H)  Glycosylated Hemoglobin A1c    Comprehensive Metabolic Panel    HM2(CBC w/o Differential)    Lipid Cascade    CANCELED: Glycosylated Hemoglobin A1c   3. Endometrial hyperplasia with atypia     4. Osteoarthritis of both hips, unspecified osteoarthritis type  Ambulatory referral to Spine Care   5. DDD (degenerative disc disease), lumbar  Ambulatory referral to Spine Care   6. Hyperlipidemia, unspecified hyperlipidemia type     7. Hypothyroidism, unspecified type  Thyroid Cascade   8. Sleep apnea, unspecified type  Ambulatory referral to Sleep Medicine   9. Fatigue, unspecified type  Ambulatory referral to Sleep Medicine    Iron and Transferrin Iron Binding Capacity    Vitamin B12   10. Vitamin D deficiency  Vitamin D, Total (25-Hydroxy)     No aspirin, ibuprofen or vitamins from now until after surgery.  Tylenol is fine.    Fasting labs today.    Flu shot today.    You should take 2000 units of vitamin D3 daily.  We will check the level today.    Referral back to the spine clinic for your hip pain.  They can do a right hip injection for you and get you set up for physical therapy.    Recommend healthy diet and exercise.  Low carb diet.    Referral given to diabetic education with a new diagnosis of early  diabetes.  This order has been placed already in August.  You can call [phone] to set that up.  You can set this when recovered from surgery and after you have had the hip injection and physical therapy for your hips and back.    If this fatigue could be related to depression anxiety I am entering a list of possible counselors below.  Also if you or the counselor feels the medication is needed we can discuss that.    Ordered a sleep doctor consult to get set up with CPAP.    Covid swab ordered by gynecology, the lab team will be calling you to get that done 3 days before your surgery.     Risks and Recommendations:  The patient has the following additional risks and recommendations for perioperative complications:   - No identified additional risk factors other than previously addressed    Medication Instructions:  Patient is to take all scheduled medications on the day of surgery EXCEPT for modifications listed below:   No aspirin, ibuprofen or vitamins from now until after surgery    Signed Electronically by: Ellen Kelsey MD    Copy of this evaluation report is provided to requesting physician.    Preop [facility] Preop Guidelines    Revised Cardiac Risk Index

## 2021-06-12 NOTE — ANESTHESIA POSTPROCEDURE EVALUATION
Patient: Aby Giraldo  Procedure(s):  HYSTEROSCOPY, DILATION AND CURETTAGE, POLYPECTOMY  Anesthesia type: MAC    Patient location: Phase II Recovery  Last vitals:   Vitals Value Taken Time   /69 10/28/20 0900   Temp 36.1  C (97  F) 10/28/20 0759   Pulse 78 10/28/20 0913   Resp 20 10/28/20 0759   SpO2 94 % 10/28/20 0913   Vitals shown include unvalidated device data.  Post vital signs: stable  Level of consciousness: awake and responds to simple questions  Post-anesthesia pain: pain controlled  Post-anesthesia nausea and vomiting: no  Pulmonary: unassisted, return to baseline  Cardiovascular: stable and blood pressure at baseline  Hydration: adequate  Anesthetic events: no    QCDR Measures:  ASA# 11 - Reanna-op Cardiac Arrest: ASA11B - Patient did NOT experience unanticipated cardiac arrest  ASA# 12 - Reanna-op Mortality Rate: ASA12B - Patient did NOT die  ASA# 13 - PACU Re-Intubation Rate: NA - No ETT / LMA used for case  ASA# 10 - Composite Anes Safety: ASA10A - No serious adverse event    Additional Notes:

## 2021-06-12 NOTE — TELEPHONE ENCOUNTER
"PSP:  Samantha CROCKER   Last clinic visit:  8/24/20 consult; 9/1/20 left hip inj per Dr. Vora  Reason for call: Wants to make appointment with Samantha for follow up AND injection appointment same day. C/o right hip pain  Clinical information:  Chart reviewed. Per PSP's notes stated if pt had good relief with the left hip injection, but continued to have pain in right hip would repeat the same on right. Patient reports she \"had 80% relief with the injection, although it has lessened to 70% now.\"   Advice given to patient: Explained per note, PSP planned to repeat the injection on right if had good relief with the left side. Order placed in [facility]. As patient just received a vaccine on Wednesday, the earliest she can have injection is 11/4/20. She should return for a follow up 2 weeks after this injection. PSP would be updated on response to 1st injection. If treatment plan different patient will be called. Stated understanding.    Provider to address: OK for the right hip injection and then follow up    "

## 2021-06-12 NOTE — TELEPHONE ENCOUNTER
Refills Approved x 2     Rx renewed per Medication Renewal Policy. Medications were both last renewed on 8/8/2019 with 3 refills.  Last office visit: 8/12/2020 with ARLEEN Quezada NP @ [facility]    Otilia Sharma, [facility] Triage/Med Refill 10/7/2020     Requested Prescriptions   Pending Prescriptions Disp Refills     levothyroxine (SYNTHROID, LEVOTHROID) 150 MCG tablet 90 tablet 3     Sig: Take 1 tablet (150 mcg total) by mouth Daily at 6:00 am.       Thyroid Hormones Protocol Passed - 10/7/2020  5:50 PM        Passed - Provider visit in past 12 months or next 3 months     Last office visit with prescriber/PCP: 7/29/2020 Ellen Kelsey MD OR same dept: 7/29/2020 Ellen Kelsey MD OR same specialty: 7/29/2020 Ellen Kelsey MD  Last physical: 8/8/2019 Last MTM visit: Visit date not found   Next visit within 3 mo: Visit date not found  Next physical within 3 mo: Visit date not found  Prescriber OR PCP: Ellen Kelsey MD  Last diagnosis associated with med order: 1. Hypothyroidism  - levothyroxine (SYNTHROID, LEVOTHROID) 150 MCG tablet; Take 1 tablet (150 mcg total) by mouth Daily at 6:00 am.  Dispense: 90 tablet; Refill: 3    2. Hyperlipidemia  - lovastatin (MEVACOR) 40 MG tablet; Take 1 tablet (40 mg total) by mouth daily.  Dispense: 90 tablet; Refill: 3    If protocol passes may refill for 12 months if within 3 months of last provider visit (or a total of 15 months).             Passed - TSH on file in past 12 months for patient age 12 & older     TSH   Date Value Ref Range Status   07/29/2020 1.67 0.30 - 5.00 uIU/mL Final                      lovastatin (MEVACOR) 40 MG tablet 90 tablet 3     Sig: Take 1 tablet (40 mg total) by mouth daily.       Statins Refill Protocol (Hmg CoA Reductase Inhibitors) Passed - 10/7/2020  5:50 PM        Passed - PCP or prescribing provider visit in past 12 months      Last office visit with prescriber/PCP: 7/29/2020 Ellen Kelsey MD OR same dept: 7/29/2020  Ellen Kelsey MD OR same specialty: 7/29/2020 Ellen Kelsey MD  Last physical: 8/8/2019 Last MTM visit: Visit date not found   Next visit within 3 mo: Visit date not found  Next physical within 3 mo: Visit date not found  Prescriber OR PCP: Ellen Kelsey MD  Last diagnosis associated with med order: 1. Hypothyroidism  - levothyroxine (SYNTHROID, LEVOTHROID) 150 MCG tablet; Take 1 tablet (150 mcg total) by mouth Daily at 6:00 am.  Dispense: 90 tablet; Refill: 3    2. Hyperlipidemia  - lovastatin (MEVACOR) 40 MG tablet; Take 1 tablet (40 mg total) by mouth daily.  Dispense: 90 tablet; Refill: 3    If protocol passes may refill for 12 months if within 3 months of last provider visit (or a total of 15 months).

## 2021-06-12 NOTE — ANESTHESIA CARE TRANSFER NOTE
Last vitals:   Vitals:    10/28/20 0759   BP: (P) 108/62   Pulse: (P) 83   Resp: (P) 20   Temp: (P) 36.1  C (97  F)   SpO2: (P) 99%     Patient's level of consciousness is drowsy  Spontaneous respirations: yes  Maintains airway independently: yes  Dentition unchanged: yes  Oropharynx: oropharynx clear of all foreign objects    QCDR Measures:  ASA# 20 - Surgical Safety Checklist: WHO surgical safety checklist completed prior to induction    PQRS# 430 - Adult PONV Prevention: 4558F - Pt received => 2 anti-emetic agents (different classes) preop & intraop  ASA# 8 - Peds PONV Prevention: 4558F - Pt received => 2 anti-emetic agents (different classes) preop & intraop  PQRS# 424 - Reanna-op Temp Management: 4559F - At least one body temp DOCUMENTED => 35.5C or 95.9F within required timeframe  PQRS# 426 - PACU Transfer Protocol: - Transfer of care checklist used  ASA# 14 - Acute Post-op Pain: ASA14B - Patient did NOT experience pain >= 7 out of 10

## 2021-06-12 NOTE — PATIENT INSTRUCTIONS - HE
Please follow up two weeks post procedure with Samantha to evaluate your plan of care.       DISCHARGE INSTRUCTIONS    During office hours (8:00 a.m.- 4:00 p.m.) questions or concerns may be answered  by calling Spine Center Navigation Nurses at  [phone].  Messages received after hours will be returned the following business day.      In the case of an emergency, please dial 911 or seek assistance at the nearest Emergency Room/Urgent Care facility.     All Patients:    ? You may experience an increase in your symptoms for the first 2 days (It may take anywhere between 2 days- 2 weeks for the steroid to have maximum effect).    ? You may use ice on the injection site, as frequently as 20 minutes each hour if needed.    ? You may take your pain medicine.    * You may continue taking your regular medication after your injection.     ? You may shower. No swimming, tub bath or hot tub for 48 hours.  You may remove your bandaid/bandage as soon as you are home.    ? You may resume light activities, as tolerated.    ? Resume your usual diet as tolerated.    ? It is strongly advised that you do not drive for 1-3 hours post injection.      POSSIBLE STEROID SIDE EFFECTS (If steroid/cortisone was used for your procedure)    -If you experience these symptoms, it should only last for a short period      Swelling of the legs                Skin redness (flushing)       Mouth (oral) irritation     Blood sugar (glucose) levels              Sweats                      Mood changes    Headache    Sleeplessness    Weakened immune system for up to 14 days, which could increase the risk of brenda the COVID-19 virus and/or experiencing more severe symptoms of the disease, if exposed.    Decreased effectiveness of the flu vaccine if given within 2 weeks of the steroid.         POSSIBLE PROCEDURE SIDE EFFECTS  -Call the Spine Center if you are concerned    Increased Pain             Increased numbness/tingling        Nausea/Vomiting             Bruising/bleeding at site        Redness or swelling                                                Difficulty walking        Weakness             Fever greater than 100.5

## 2021-06-12 NOTE — PATIENT INSTRUCTIONS - HE
No aspirin, ibuprofen or vitamins from now until after surgery.  Tylenol is fine.    Fasting labs today.    Flu shot today.    You should take 2000 units of vitamin D3 daily.  We will check the level today.    Referral back to the spine clinic for your hip pain.  They can do a right hip injection for you and get you set up for physical therapy.    Recommend healthy diet and exercise.  Low carb diet.    Referral given to diabetic education with a new diagnosis of early diabetes.  This order has been placed already in August.  You can call [phone] to set that up.  You can set this when recovered from surgery and after you have had the hip injection and physical therapy for your hips and back.    If this fatigue could be related to depression anxiety I am entering a list of possible counselors below.  Also if you or the counselor feels the medication is needed we can discuss that.    Ordered a sleep doctor consult to get set up with CPAP.    Covid swab ordered by gynecology, the lab team will be calling you to get that done 3 days before your surgery.      [facility]:   [facility] [phone]  [address], [place] [address]     [facility]: [phone]  [address], [facility] [address]    [facility]: [phone]  [address], [place] [address]    [facility]:  [phone]   [address] [place] [address]    [facility]:  [facility] [phone]   [address] [place] [address], [address]    [place]:  [facility]: [phone]  [address] [facility] [address]     [place]:  [facility]  [phone]  [address] [facility], [place] [address]    [facility] [phone]    [address], [facility]  [address]    [facility] /[phone]   [address] [facility] [place], [address]    [facility] [phone] [address] [facility], [address]    [facility] [phone] [address]

## 2021-06-12 NOTE — TELEPHONE ENCOUNTER
Please schedule the patient for her right ultrasound-guided hip joint injection and then have her follow-up with Samantha Abdi 2 weeks after that.

## 2021-06-13 NOTE — PROGRESS NOTES
[facility] Daily Progress     Patient Name: Aby Giraldo  Date: 12/7/2020  Visit #: 3/8-12  PTA visit #:    Referral Diagnosis:  Lumbar radicular pain [M54.16]  - Primary       Primary osteoarthritis of left hip [M16.12]       Hip pain, right [M25.551]       Lumbar facet arthropathy [M47.816]       Referring provider: Samantha Abdi PA-C  Visit Diagnosis:     ICD-10-CM    1. Osteoarthritis of both hips, unspecified osteoarthritis type  M16.0    2. DDD (degenerative disc disease), lumbar  M51.36    3. Hip pain, right  M25.551    4. Lumbar facet arthropathy  M47.816    5. Lumbar radicular pain  M54.16    Arthritis left hip less arthritis right hip  Growths in uterus benign.  L4,5 stenosis with narrowing, bulging  Pan hips laterally and anterior bilaterally upper thigh and lower leg and then into her great toe bilaterally. Primarily left more than right.  Muscles tight stretching chiropractor and massaging.  Will have an injection around December 11.  Since January 2020 have a muscle relaxant at night to help her sleep.    Assessment:   From Evaluation: bilateral hip OA, Lumbar radiculopathy, trend[place]burg gait.  Stiffness at T12 in both flexion and extension.  Weakness in the core.  Weakness in the gluteus medius bilaterally. Significant decrease in lumbar extension.      Pain started in January.  Pt started with Chiro in March.  Went to MD in June as pain was not improving - has has bilateral hip injections and has an injection scheduled today for low back.   Pt is feeling frustrated today that she can hardly walk.  So far low back injection has not been helping.  Some improved ROM in her hip noted today with PROM.  Able to lay flat with arms over head to stretch psoas with greater ease.  Able to perform more reps of HEP. Right hip continues to be more painful and tight then left.     Patient demonstrates understanding/independence with home program.  Patient is benefitting from skilled physical  therapy and is making steady progress toward functional goals.  Patient is appropriate to continue with skilled physical therapy intervention, as indicated by initial plan of care.    Goal Status:  Pt. will demonstrate/verbalize independence in self-management of condition in : 12 weeks;Comment  Comment:: To aid in managing her symptoms at home.  Pt. will be independent with home exercise program in : 12 weeks;Comment  Comment:: To aid in managing her symptoms at home.  Pt. will have improved quality of sleep: with less pain;getting 75-90% of required amount;in 12 weeks;Comment  Comment:: patient will be able to sleep better without use of muscle relaxant  Pt. will show improved balance for safer : standing;for household ambulation;for chores;for work;in 12 weeks  Pt. will be able to walk : 10 minutes;with FWB;on even surfaces;2 blocks;with less pain;with less difficulty;for community mobility;for other activity;in 6 weeks;in 12 weeks  Other Activity:: to walk her dog without increased pain.  Patient will stand : 10 minutes;with less pain;with less difficultty;for home chores;in 6 weeks;in 12 weeks  Patient will transfer: sit/stand;for car;for in/out of bed;for toileting;for in/out of chair;with less pain;with less difficulty;in 12 weeks;Comment  Comment: decrease pain by 40-60 %    No data recorded    Plan / Patient Education:     Continue with initial plan of care.  Progress with home program as tolerated.  Plan for next visit:   Quad stretch with leg off table - progress towards.  Pt will bring HEP Wednesday.  Write in psoas stretch.  Add in manual therapy for the back and hips as needed.  Go over posture and body mechanics,  Reassess hip flexor strength as needed. Review HEP and continue PROM for hips   Subjective:     Pain Ratin - in R hip today.   Back injection - last week. No change in symptoms.     Objective:   Exercises:  Exercise #1: wall circles (hula hoop)  Exercise #2: shift hips to the side to work  on ballance and getting the gluteus medius to start working.  Comment #2: pelvic tilts x10 in hooklying - also discussed performing in standing and sitting (will look at next session)  Exercise #3: TA: hooklying pelvic tilt with 5 sec hold x5  Comment #3: TA: heel slides and butterflies 5x on each side  Exercise #4: clams x5  Comment #4: LTR x20  Exercise #5: WRITE DOWN next session: lay with one leg extended and one knee bend to stretch psoas x30 seconds    Advised pt to perform exercises 1 additional time today, 2 times tomorrow before Wednesday appointment. Make sure she gets out her HEP sheets as she is doing the exercises. Bring sheets on Wednesday.     PROM hips: right hip is tighter and more painful then left.   Right hip flexion increased to 100 today.     Past session: Right: 90 hip flexion, limited hip ER to about 20, IR to neutral - pain   Left: 100 hip flexion, limited hip ER to about 25, IR to neutral or 5 degrees - pain   Pt also has limited abduction - performed with 30 degree flexion to limit pain.     Gait: WBOS, trendelenburg, limited knee ROM - stiffness in gait - slow samy - IR hips/feet   Mild improved samy post session - moved with greater ease.     Pt has difficulty with rolling side to side on mat.  Has railings at home.     Treatment Today    TREATMENT MINUTES COMMENTS   Evaluation     Self-care/ Home management     Manual therapy 10 S/L:  Hip flexor stretch hold 30 seconds on right   Supine: PROM and stretch of R hip  Hip flexion, abd with 30 degrees flexion, IR and ER with 90 degrees flexion   Pain with IR and limited to neutral.   Long axis traction in open packed position oscillation x20    Neuromuscular Re-education     Therapeutic Activity     Therapeutic Exercises 20 See flow sheet above    Gait training     Modality__________________                Total 30    Blank areas are intentional and mean the treatment did not include these items.       Antonette Gutierrez, PT    12/7/2020

## 2021-06-13 NOTE — PROGRESS NOTES
[facility] Daily Progress     Patient Name: Aby Giraldo  Date: 12/2/2020  Visit #: 2/8-12  PTA visit #:    Referral Diagnosis:  Lumbar radicular pain [M54.16]  - Primary       Primary osteoarthritis of left hip [M16.12]       Hip pain, right [M25.551]       Lumbar facet arthropathy [M47.816]       Referring provider: Samantha Abdi PA-C  Visit Diagnosis:     ICD-10-CM    1. Osteoarthritis of both hips, unspecified osteoarthritis type  M16.0    2. DDD (degenerative disc disease), lumbar  M51.36    3. Hip pain, right  M25.551    4. Lumbar facet arthropathy  M47.816    5. Lumbar radicular pain  M54.16        Arthritis left hip less arthritis right hip  Growths in uterus benign.  L4,5 stenosis with narrowing, bulging  Pan hips laterally and anterior bilaterally upper thigh and lower leg and then into her great toe bilaterally.  Primarily left more than right.  Muscles tight stretching chiropractor and massaging.  Will have an injection around December 11.  Since January 2020 have a muscle relaxant at night to help her sleep.    Assessment:   From Evaluation: bilateral hip OA, Lumbar radiculopathy, trend[place] gait.  Stiffness at T12 in both flexion and extension.  Weakness in the core.  Weakness in the gluteus medius bilaterally. Significant decrease in lumbar extension.      Pain started in January.  Pt started with Chiro in March.  Went to MD in June as pain was not improving - has has bilateral hip injections and has an injection scheduled today for low back.     Patient demonstrates understanding/independence with home program.  Patient is benefitting from skilled physical therapy and is making steady progress toward functional goals.  Patient is appropriate to continue with skilled physical therapy intervention, as indicated by initial plan of care.    Goal Status:  Pt. will demonstrate/verbalize independence in self-management of condition in : 12 weeks;Comment  Comment:: To aid in managing her  symptoms at home.  Pt. will be independent with home exercise program in : 12 weeks;Comment  Comment:: To aid in managing her symptoms at home.  Pt. will have improved quality of sleep: with less pain;getting 75-90% of required amount;in 12 weeks;Comment  Comment:: patient will be able to sleep better without use of muscle relaxant  Pt. will show improved balance for safer : standing;for household ambulation;for chores;for work;in 12 weeks  Pt. will be able to walk : 10 minutes;with FWB;on even surfaces;2 blocks;with less pain;with less difficulty;for community mobility;for other activity;in 6 weeks;in 12 weeks  Other Activity:: to walk her dog without increased pain.  Patient will stand : 10 minutes;with less pain;with less difficultty;for home chores;in 6 weeks;in 12 weeks  Patient will transfer: sit/stand;for car;for in/out of bed;for toileting;for in/out of chair;with less pain;with less difficulty;in 12 weeks;Comment  Comment: decrease pain by 40-60 %    No data recorded    Plan / Patient Education:     Continue with initial plan of care.  Progress with home program as tolerated.  Plan for next visit:    Reassess the patient's T12 region, assess psoas and add in a supine stretch where you just relax supine without a pillow and let the psoas relax and if need be to put a pillow under the buttocks but let the hip be able to drop down and if need be to raise your arms over your head and to stretch more the upper portion of the psoas. Add in manual therapy for the back and hips as needed.  Go over posture and body mechanics,  Reassess hip flexor strength as needed. Review HEP and continue PROM for hips   Subjective:     Pain Ratin   Pain in upper gluts that wraps around to lateral hips.   R hip feels tighter today then left.     Objective:   Exercises:  Exercise #1: wall circles (hula hoop)  Exercise #2: shift hips to the side to work on ballance and getting the gluteus medius to start working.  Comment #2:  pelvic tilts x10 in hooklying - also discussed performing in standing and sitting (will look at next session)  Exercise #3: TA: hooklying pelvic tilt with 5 sec hold x5  Comment #3: TA: heel slides and butterflies 5x on each side  Exercise #4: clams x5  Comment #4: LTR x20  Exercise #5: WRITE DOWN next session: lay with one leg extended and one knee bend to stretch psoas x30 seconds    PROM hips: right hip is tighter and more painful then left.   Right: 90 hip flexion, limited hip ER to about 20, IR to neutral - pain   Left: 100 hip flexion, limited hip ER to about 25, IR to neutral or 5 degrees - pain   Pt also has limited abduction - performed with 30 degree flexion to limit pain.     Gait: WBOS, trendelenburg, limited knee ROM - stiffness in gait - slow samy  Improved samy post session - moved with greater ease.     Treatment Today    TREATMENT MINUTES COMMENTS   Evaluation     Self-care/ Home management     Manual therapy 28 S/L: STM to right glut  Hip flexor stretch hold 60 seconds on right and left   Supine: PROM and stretch of hips   Hip flexion, abd with 30 degrees flexion, IR and ER with 90 degrees flexion   Long axis traction in open packed position oscillation x20    Neuromuscular Re-education     Therapeutic Activity     Therapeutic Exercises 23 See flow sheet above    Gait training     Modality__________________                Total 51    Blank areas are intentional and mean the treatment did not include these items.       Antonette Gutierrez, PT   12/2/2020

## 2021-06-13 NOTE — PROGRESS NOTES
[facility] Daily Progress     Patient Name: Aby Giraldo  Date: 12/9/2020  Visit #: 4/8-12  PTA visit #:    Referral Diagnosis:  Lumbar radicular pain [M54.16]  - Primary       Primary osteoarthritis of left hip [M16.12]       Hip pain, right [M25.551]       Lumbar facet arthropathy [M47.816]       Referring provider: Samantha Abdi PA-C  Visit Diagnosis:     ICD-10-CM    1. Osteoarthritis of both hips, unspecified osteoarthritis type  M16.0    2. DDD (degenerative disc disease), lumbar  M51.36    3. Hip pain, right  M25.551    4. Lumbar facet arthropathy  M47.816    5. Lumbar radicular pain  M54.16    Arthritis left hip less arthritis right hip  Growths in uterus benign.  L4,5 stenosis with narrowing, bulging  Pan hips laterally and anterior bilaterally upper thigh and lower leg and then into her great toe bilaterally. Primarily left more than right.  Muscles tight stretching chiropractor and massaging.  Will have an injection around December 11.  Since January 2020 have a muscle relaxant at night to help her sleep.    Assessment:   From Evaluation: bilateral hip OA, Lumbar radiculopathy, trend[place] gait.  Stiffness at T12 in both flexion and extension.  Weakness in the core.  Weakness in the gluteus medius bilaterally. Significant decrease in lumbar extension.      Pain started in January.  Pt started with Chiro in March.  Went to MD in June as pain was not improving - has has bilateral hip injections and has an injection scheduled today for low back.     So far low back injection has not been helping.  Some improved ROM in her hip noted.   Able to lay flat with arms over head to stretch psoas with greater ease and even able to bring leg off the table for greater stretch .  Able to perform more reps of HEP. Right hip continues to be more painful and tight then left.     Patient demonstrates understanding/independence with home program.  Patient is benefitting from skilled physical therapy and is  making steady progress toward functional goals.  Patient is appropriate to continue with skilled physical therapy intervention, as indicated by initial plan of care.    Goal Status:  Pt. will demonstrate/verbalize independence in self-management of condition in : 12 weeks;Comment  Comment:: To aid in managing her symptoms at home.  Pt. will be independent with home exercise program in : 12 weeks;Comment  Comment:: To aid in managing her symptoms at home.  Pt. will have improved quality of sleep: with less pain;getting 75-90% of required amount;in 12 weeks;Comment  Comment:: patient will be able to sleep better without use of muscle relaxant  Pt. will show improved balance for safer : standing;for household ambulation;for chores;for work;in 12 weeks  Pt. will be able to walk : 10 minutes;with FWB;on even surfaces;2 blocks;with less pain;with less difficulty;for community mobility;for other activity;in 6 weeks;in 12 weeks  Other Activity:: to walk her dog without increased pain.  Patient will stand : 10 minutes;with less pain;with less difficultty;for home chores;in 6 weeks;in 12 weeks  Patient will transfer: sit/stand;for car;for in/out of bed;for toileting;for in/out of chair;with less pain;with less difficulty;in 12 weeks;Comment  Comment: decrease pain by 40-60 %    No data recorded    Plan / Patient Education:     Continue with initial plan of care.  Progress with home program as tolerated.  Plan for next visit: Review Quad stretch and standing ex's.  Ask about sock aid.  Assess K-tape. Review standing ex's.   Add in manual therapy for the back and hips as needed.  Go over posture and body mechanics,  Reassess hip flexor strength as needed. Review HEP and continue PROM for hips   Subjective:   Pt felt the passive stretching last session was helpful.    Pt compliant with HEP   Pain Ratin - in R hip today.   Low back muscles into gluts painful.   Back injection - last week. No change in symptoms.     Objective:  "  Nustep: 6 min - 4 resistance     Exercises:  Exercise #1: wall circles (hula hoop)  Exercise #2: shift hips to the side to work on ballance and getting the gluteus medius to start working.  Comment #2: pelvic tilts x10 in hooklying - also discussed performing in standing and sitting (will look at next session)  Exercise #3: TA: hooklying pelvic tilt with 5 sec hold x5  Comment #3: TA: heel slides and butterflies 5x on each side  Exercise #4: clams x5  Comment #4: LTR x20  Exercise #5: lay with one leg extended and one knee bend to stretch psoas x30 seconds  Comment #5: Standing hip abd and ext x5 (working up to 10)  Exercise #6: mini squat 5-10  Comment #6: Standing HS curls 5-10  Exercise #7: heel raises 5-10  Comment #7: Quad stretch with leg off the table hold 20-30 seconds    Clams are the most difficult due to weakness.   Pt performing \"butterfly\" and heel slides without TA contraction.   Pt able to demo ex's with minimal cueing needed.   Progress to figure-4, SKTC     Past session: Right: 90 hip flexion, limited hip ER to about 20, IR to neutral - pain   Left: 100 hip flexion, limited hip ER to about 25, IR to neutral or 5 degrees - pain   Pt also has limited abduction - performed with 30 degree flexion to limit pain.     Gait: WBOS, trendelenburg, limited knee ROM - stiffness in gait - slow samy - IR hips/feet   Mild improved samy post session - moved with greater ease.     Pt has difficulty with rolling side to side on mat.  Has railings at home.     Discussed purchase of sock aid on [employer].  Pt unable to put on socks.  Will continue to progress hip flexion and ER.     K-Tape  Prior to application skin was cleaned with alcohol wipe  2 horizontal strips were applied to lumbosacral junction 5-6\" with mild stretch.  No stretch applied to 2\" ends of tape.   Pt was sitting during application.       Treatment Today    TREATMENT MINUTES COMMENTS   Evaluation     Self-care/ Home management     Manual therapy " 23 Supine: PROM and stretch of hips   Hip flexion, IR and ER with 90 degrees flexion   Long axis traction in open packed position oscillation x20   HS stretch    Neuromuscular Re-education     Therapeutic Activity     Therapeutic Exercises 30 See flow sheet above    Gait training     Modality__________________                Total 53    Blank areas are intentional and mean the treatment did not include these items.       Antonette Gutierrez, PT   12/9/2020

## 2021-06-13 NOTE — PATIENT INSTRUCTIONS - HE
DISCHARGE INSTRUCTIONS    During office hours (8:00 a.m.- 4:00 p.m.) questions or concerns may be answered  by calling [facility] at  [phone].  Messages received after hours will be returned the following business day.      In the case of an emergency, please dial 911 or seek assistance at the nearest Emergency Room/Urgent Care facility.     All Patients:    ? You may experience an increase in your symptoms for the first 2 days (It may take anywhere between 2 days- 2 weeks for the steroid to have maximum effect).    ? You may use ice on the injection site, as frequently as 20 minutes each hour if needed.    ? You may take your pain medicine.    ? You may continue taking your regular medication after your injection. If you have had a Medial Branch Block you may resume pain medication once your pain diary is completed.    ? You may shower. No swimming, tub bath or hot tub for 48 hours.  You may remove your bandaid/bandage as soon as you are home.    ? You may resume light activities, as tolerated.    ? Resume your usual diet as tolerated.    ? It is strongly advised that you do not drive for 1-3 hours post injection.    ? If you have had oral sedation:  Do not drive for 8 hours post injection.      ? If you have had IV sedation:  Do not drive for 24 hours post injection.  Do not operate hazardous machinery or make important personal/business decisions for 24 hours.      POSSIBLE STEROID SIDE EFFECTS (If steroid/cortisone was used for your procedure)    -If you experience these symptoms, it should only last for a short period      Swelling of the legs                Skin redness (flushing)       Mouth (oral) irritation     Blood sugar (glucose) levels              Sweats                      Mood changes    Headache    Sleeplessness    Weakened immune system for up to 14 days, which could increase the risk of brenda the COVID-19 virus and/or experiencing more severe symptoms of the  disease, if exposed.    Decreased effectiveness of the flu vaccine if given within 2 weeks of the steroid.         POSSIBLE PROCEDURE SIDE EFFECTS  -Call the [facility] if you are concerned    Increased Pain             Increased numbness/tingling        Nausea/Vomiting            Bruising/bleeding at site        Redness or swelling                                                Difficulty walking        Weakness             Fever greater than 100.5    *In the event of a severe headache after an epidural steroid injection that is relieved by lying down, please call the [facility] to speak with a clinical staff member*

## 2021-06-13 NOTE — PROGRESS NOTES
[facility] Daily Progress     Patient Name: Aby Giraldo  Date: 12/23/2020  Visit #: 8/8-12  PTA visit #:    Referral Diagnosis:  Lumbar radicular pain [M54.16]  - Primary       Primary osteoarthritis of left hip [M16.12]       Hip pain, right [M25.551]       Lumbar facet arthropathy [M47.816]       Referring provider: Samantha Abdi PA-C  Visit Diagnosis:     ICD-10-CM    1. Osteoarthritis of both hips, unspecified osteoarthritis type  M16.0    2. DDD (degenerative disc disease), lumbar  M51.36    3. Hip pain, right  M25.551    4. Lumbar facet arthropathy  M47.816    5. Lumbar radicular pain  M54.16       Arthritis left hip less arthritis right hip  Growths in uterus benign.  L4,5 stenosis with narrowing, bulging  Pan hips laterally and anterior bilaterally upper thigh and lower leg and then into her great toe bilaterally. Primarily left more than right.  Muscles tight stretching chiropractor and massaging.  Will have an injection around December 11.  Since January 2020 have a muscle relaxant at night to help her sleep.    Assessment:   From Evaluation: bilateral hip OA, Lumbar radiculopathy, trend[place]burg gait.  Stiffness at T12 in both flexion and extension.  Weakness in the core.  Weakness in the gluteus medius bilaterally. Significant decrease in lumbar extension.      Pain started in January.  Pt started with Chiro in March.  Went to MD in June as pain was not improving - has has bilateral hip injections and low back injection.     Overall pt is noticing improvement. Will continue therapy 2x a week through end of year. Improved gait observed today with less pain.     Patient demonstrates understanding/independence with home program.  Patient is benefitting from skilled physical therapy and is making steady progress toward functional goals.  Patient is appropriate to continue with skilled physical therapy intervention, as indicated by initial plan of care.    Goal Status:  Pt. will  demonstrate/verbalize independence in self-management of condition in : 12 weeks;Comment  Comment:: To aid in managing her symptoms at home.  Pt. will be independent with home exercise program in : 12 weeks;Comment  Comment:: To aid in managing her symptoms at home.  Pt. will have improved quality of sleep: with less pain;getting 75-90% of required amount;in 12 weeks;Comment   Comment:: patient will be able to sleep better without use of muscle relaxant Continues to use muscle relaxer (12/18/20)  Pt. will show improved balance for safer : standing;for household ambulation;for chores;for work;in 12 weeks  Pt. will be able to walk : 10 minutes;with FWB;on even surfaces;2 blocks;with less pain;with less difficulty;for community mobility;for other activity;in 6 weeks;in 12 weeks  Other Activity:: to walk her dog without increased pain.  3 blocks total - has been getting easier   Patient will stand : 10 minutes;with less pain;with less difficultty;for home chores;in 6 weeks;in 12 weeks  Patient will transfer: sit/stand;for car;for in/out of bed;for toileting;for in/out of chair;with less pain;with less difficulty;in 12 weeks;Comment  Comment: decrease pain by 40-60 % - getting in an out of the car is hard but less painful. Uses UE to help get herself up from chair     No data recorded    Plan / Patient Education:     Continue with initial plan of care.  Progress with home program as tolerated.     Plan for next visit: Add in manual therapy for the back and hips as needed.  Go over posture and body mechanics.   Reassess hip flexor strength as needed. Review HEP and continue PROM for hips. AMBULATING   Look at getting in and out of her car.   Subjective:   Overall pt reports improvement. Able to walk easier with increased hip movement and decreased shoulder lateral movement.   Pt compliant with HEP   Getting massage 1x a week (was getting massage 1x a month but has increased it)   Pain Rating: 3     K-tape was helpful.  "    Objective:   Nustep: 5 min - 6 resistance - no UE   Discussed purchase of Nustep at home for aerobic ex's.     Gait: WBOS, minimal knee bend, lateral shifting of shoulders has improved.   Less cues needed.  NBOS and heel to toe gait   IR of left foot.     K-Tape - gave pt K-tape to apply at home as she is getting a massage later today.   Prior to application skin was cleaned with alcohol wipe  2 horizontal strips were applied to lumbosacral junction 5-6\" with mild stretch.  No stretch applied to 2\" ends of tape.   Pt was sitting during application.     Routine: very sore in the morning - pt performs 3 stretches- lumbar flexion in standing, ITB stretch in standing and standing lunge  In the shower does hula hoop and hip mobility  Throughout the day if she is getting tight she does more stretches and does more walking   At 7:00pm does the strengthening ex's that I gave her in standing  At night she does her supine, S/L exercises.     Observed pt get in and out of the car.  Increased difficulty getting into car but able to perform while holding on and then swinging her right leg in.  Suggested pt sit first and swivel her legs in but unable to perform due to limited room.     Exercises:  Exercise #1: wall circles (hula hoop) - discussed  Comment #1: Modified figure-4 piriformis stretch 5 seconds x2-3 in supine and sitting  Exercise #2: Bridges x5 (2 sets) -not today  Comment #2: pelvic tilts x10 in hooklying - not today  Exercise #3: TA: hooklying pelvic tilt with 5 sec hold x5 - not today  Comment #3: TA: heel slides and butterflies 5x on each side - pt able to add in TA contraction today for the first time - not today  Exercise #4: clams 2 sets x8-10 reps - not today  Comment #4: LTR x20 - not today  Exercise #5: lay with one leg extended and one knee bend to stretch psoas x30 seconds - R only  Comment #5: Standing hip abd and ext 2 sets x8-10 reps - cues needed tactile and verbal  Exercise #6: mini squat 2 sets " 8-10 reps- added sit to stand x8  Comment #6: Standing HS curls 2 sets 8-10 reps  Exercise #7: heel raises 8-10 reps 2 sets not today  Comment #7: Quad stretch with leg off the table hold 20-30 seconds - not today  Exercise #8: SLR 1 set 5 reps  Comment #8: SKTC 5 seconds x2-3 reps bilaterally - change angle of pull - to contralateral shoulder and ipsilateral  Exercise #9: SLS 30 seconds  Comment #9: single leg heel raises x3-8 (increased difficulty on R side)    Treatment Today    TREATMENT MINUTES COMMENTS   Evaluation     Self-care/ Home management     Manual therapy 23 Supine: PROM and stretch of bilateral hips  Hip flexion, IR and ER with 90 degrees flexion. adductor stretch (on right only)  hold 30-60 seconds   Long axis traction sustained hold 5 seconds x10 on right only    Neuromuscular Re-education     Therapeutic Activity     Therapeutic Exercises 30 See flow sheet above    Gait training  See above   Modality__________________                Total 53    Blank areas are intentional and mean the treatment did not include these items.       Antonette Gutierrez, PT   12/23/2020

## 2021-06-13 NOTE — PROGRESS NOTES
M16.12 (ICD-10-CM) - Primary osteoarthritis of left hip   M25.551 (ICD-10-CM) - Hip pain, right   M54.16 (ICD-10-fCM) - Lumbar radicular pain   M47.816 (ICD-10-CM) - Lumbar facet arthropathy     Arthritis left hip less arthritis right hip  Growths in uterus benign.  L4,5 stenosis with narrowing, bulging  Pan hips laterally and anterior bilaterally upper thigh and lower leg and then into her great toe bilaterally.  Primarily left more than right.  Muscles tight stretching chiropractor and massaging.  Will have an injection around December 11.  Since January 2020 have a muscle relaxant at night to help her sleep.    [facility]   [facility]-Pelvic Initial Evaluation    Patient Name: Aby Giraldo  Date of evaluation: 11/24/2020  Referral Diagnosis: Primary osteoarthritis of left hip  Referring provider: Samantha Abdi PA-C  Visit Diagnosis:     ICD-10-CM    1. Osteoarthritis of both hips, unspecified osteoarthritis type  M16.0    2. DDD (degenerative disc disease), lumbar  M51.36        Assessment:    bilateral hip OA, Lumbar radiculopathy, trend[place]burg gait.  Stiffness at T12 in both flexion and extension.  Weakness in the core.  Weakness in the gluteus medius bilaterally. Significant decrease in lumbar extension.    Pt. is appropriate for skilled PT intervention as outlined in the Plan of Care (POC).  Pt. is a good candidate for skilled PT services to improve pain levels and function.  Skilled Physical Therapy needed to increase ROM, increase strength, decrease pain and improve functional status.     Goals:  Pt. will demonstrate/verbalize independence in self-management of condition in : 12 weeks;Comment  Comment:: To aid in managing her symptoms at home.  Pt. will be independent with home exercise program in : 12 weeks;Comment  Comment:: To aid in managing her symptoms at home.  Pt. will have improved quality of sleep: with less pain;getting 75-90% of required amount;in 12  weeks;Comment  Comment:: patient will be able to sleep better without use of muscle relaxant  Pt. will show improved balance for safer : standing;for household ambulation;for chores;for work;in 12 weeks  Pt. will be able to walk : 10 minutes;with FWB;on even surfaces;2 blocks;with less pain;with less difficulty;for community mobility;for other activity;in 6 weeks;in 12 weeks  Other Activity:: to walk her dog without increased pain.  Patient will stand : 10 minutes;with less pain;with less difficultty;for home chores;in 6 weeks;in 12 weeks  Patient will transfer: sit/stand;for car;for in/out of bed;for toileting;for in/out of chair;with less pain;with less difficulty;in 12 weeks;Comment  Comment: decrease pain by 40-60 %    The patient was involved in setting the goals.    Patient's expectations/goals are realistic.    Barriers to Learning or Achieving Goals:  No Barriers.       Plan / Patient Instructions:        Plan of Care:   Authorization / Certification Start Date: 11/24/20  Authorization / Certification Number of Visits: 8-12  Communication with: Referral Source  Patient Related Instruction: Expected outcome;Nature of Condition;Treatment plan and rationale;Self Care instruction;Basis of treatment;Body mechanics;Posture;Precautions;Next steps  Times per Week: 1  Number of Weeks: 8-12  Number of Visits: 8-12  Discharge Planning: when goals are met or plateau of progress  Precautions / Restrictions : 2 weeks ago D and C  Therapeutic Exercise: ROM;Stretching;Strengthening  Neuromuscular Reeducation: kinesio tape;posture;balance/proprioception;TNE;core  Manual Therapy: soft tissue mobilization;myofascial release;joint mobilization;muscle energy;strain counterstrain;other  Manual Therapy: manual neural mobilization  Modalities: TENS;ultrasound;cold pack;hot pack;other  Modalities: check precautions first  Gait Training: work on getting rid of her trendellenberg gait.      Plan for next visit:    Reassess the  patient's T12 region, assess psoas and add in a supine stretch where you just relax supine without a pillow and let the psoas relax and if need be to put a pillow under the buttocks but let the hip be able to drop down and if need be to raise your arms over your head and to stretch more the upper portion of the psoas.Add in supine bent knee fall outs wit abdominal sets for the hip and heel slides with transverse abdominus as needed to get the hip to loosen up. Add in manual therapy for the back and hips as needed.  Go over posture and body mechanics,  Reassess hip flexor strength as needed.  Subjective:       Sitting 1 hour then when you get up pain and weakness in the thighs.  Constant pain.  Weakness scares her she feels she can't walk her dog.      History of Present Illness:    Aby is a 55 y.o. female who presents to therapy today with complaints of Arthritis left hip less arthritis right hip, Growths in uterus benign,L4,5 stenosis with narrowing, bulging  Pan hips laterally and anterior bilaterally upper thigh and lower leg and then into her great toe bilaterally.  Primarily left more than right.  Muscles tight, stretching chiropractor, and massaging.  Will have an injection around .  Since 2020 have a muscle relaxant at night to help her sleep.  .Better after the injection in the hips massage helps the next day or 2 with pain and weakness Date of onset/duration of symptoms is 2020.  Shore line restoration summer and fall and was ok but then in January she started to have symptoms.  Onset was sudden. Woke up with it  Symptoms are constant and getting better. She denies history of similar symptoms. She describes their previous level of function as not limited    Pain Ratin  Pain rating at best: 2  Pain rating at worst: 10  Pain description: pain, soreness and weakness   Worst pain in the AM when she first gets up.    Functional limitations are described as occurring with:   stairs  up and down causes pain and weakness   balance  bending  sitting 1 hour then increased pain with getting up from the chair.  sleeping  standing have to lean on a counter.  pain as soon as she stands up.  transitional movements getting in  bed, chair, tub and car, getting out of  bed, chair, tub and car, sit to stand and any sideways movement like getting out of car or getting out of bed.  walking can walk the dog 1 block out and badk due to soreness and weakness  Unable to bend over to put her socks on due to pain in the low back.  Patient reports benefit from:  rest  , injection:   type corizone  date(s)sept. left november right hip, heat, chiropractic care, massage         Objective:      Note: Items left blank indicates the item was not performed or not indicated at the time of the evaluation.    Patient Outcome Measures :    No data recorded     Scores range from 0-80, where a score of 80 represents maximum function. The minimal clinically important difference is a positive change of 9 points.    Examination  1. Osteoarthritis of both hips, unspecified osteoarthritis type     2. DDD (degenerative disc disease), lumbar       Involved side: Bilateral  Posture Observation:   Gait observation tred[place] gait bilaterally with the shoulder moving side to side and pain in the anterior lateraly thigh and lower leg.       General sitting posture is  fair.  General standing posture is fair.  - forward bend    Lumbar ROM:    Date:      *Indicate scale AROM AROM AROM   Lumbar Flexion WNL no change in pain     Lumbar Extension Slight past neutral and pain 6/10 bilaterally      Right Left Right Left Right Left   Lumbar Sidebending 4 finger to the knee 4 fingers to the knee       Lumbar Rotation         Thoracic Flexion      Thoracic Extension      Thoracic Sidebending         Thoracic Rotation           Lower Extremity Strength:     Date: 11/24/20     LE strength/5 Right Left Right Left Right Left   Hip Flexion (L1-3) 4+/5  pain 7/10 anterior thig medially and groin 4+/5       Hip Extension (L5-S1)         Hip Abduction (L4-5)         Hip Adduction (L2-3)         Hip External Rotation         Hip Internal Rotation         Knee Extension (L3-4) 5/5 5/5       Knee Flexion 5/5 5/5       Ankle Dorsiflexion (L4-5) 5/5 5/5       Great Toe Extension (L5) 5/5 5/5       Ankle Plantar flexion (S1)         Abdominals        Sensation           Reflex Testing  Lumbar Dermatomes Right Left UE Reflexes Right Left   Iliac Crest and Groin (L1)   Biceps (C5-6)     Anterior Medial Thigh (L2)   Brachioradialis (C5-6)     Anterior Thigh, Medial Epicondyle Femur (L3)   Triceps (C7-8)     Lateral Thigh, Anterior Knee, Medial Leg/Malleolus (L4)   Rebecca s test     Lateral Leg, Dorsal Foot (L5)   LE Reflexes     Lateral Foot (S1)   Patellar (L3-4)     Posterior Leg (S2)   Achilles (S1-2)     Other:   Babinski Response       Palpation: L2,1 left rotated and T3,2,1    Anterior stomach centrally with general listening  Pain on the way back up with lumbar flexion.    Left IR 6-7/10 pain to neutral ER moderately limited  Right 4/10 pain gently moved hip signifcant ROM loss ER more than IR   Listening to transverse colon anteriorly.  T12 left  Right rotated upper back    T12 decreased right side bending both in flexion and in extension    Lumbar Special Tests:     Lumbar Special Tests Right Left SI Tests Right  Left   Quadrant test   SI Compression     Straight leg raise 40 degrees cramp posterior hamstring medially 55 degrees groin pain on the left SI Distraction     Crossover response   POSH Test     Slump   Sacral Thrust     Sit-up test  FADIR     Trunk extensor endurance test  Resisted Abduction     Prone instability test  Other:     Pubic shotgun  Other:           Passive Mobility - Joint Integrity:  When seated T12 decreased right side bending both in flexion and in extension.    LE Screen:    Listening to transverse colon anteriorly.  T12 left  Right  rotated upper back    T12 decreased right side bending both in flexion and in extension    Treatment Today     TREATMENT MINUTES COMMENTS   Evaluation 30 See evaluation   Self-care/ Home management     Manual therapy 15 T12 decreased right side bending both in flexion and in extension did MET to correct both flexed and extended left rotated T12   Neuromuscular Re-education     Therapeutic Activity     Therapeutic Exercises 10 Wall circles and shifting hips from side to side.   Gait training     Modality__________________                Total 55    Blank areas are intentional and mean the treatment did not include these items.     PT Evaluation Code: (Please list factors)  Patient History/Comorbidities: morbid obesity, Dand C 2 weeks ago  Examination: bilateral hip OA and lumbar radiculopathy  Clinical Presentation: stable   Clinical Decision Making: low    Patient History/  Comorbidities Examination  (body structures and functions, activity limitations, and/or participation restrictions) Clinical Presentation Clinical Decision Making (Complexity)   No documented Comorbidities or personal factors 1-2 Elements Stable and/or uncomplicated Low   1-2 documented comorbidities or personal factor 3 Elements Evolving clinical presentation with changing characteristics Moderate   3-4 documented comorbidities or personal factors 4 or more Unstable and unpredictable High              Amber Kaur PT  11/24/2020  1:08 PM

## 2021-06-13 NOTE — PROGRESS NOTES
Video visit initiated with the patient.  She had a bilateral L4-5 transforaminal epidural steroid injection December 2, 2020 (8 days ago).  This has provided less than 50% relief of her pain.  She was disappointed because she had more significant and immediate relief of her pain after hip joint injections earlier this year.  I did explain to the patient that corticosteroid injections typically take 2 to 3 weeks to reach the peak pain relief.  I suggested that we reschedule her follow-up visit to next week so that we can give a fair assessment to the injection.  She notes that she is feeling somewhat better than before the injection and physical therapy is helping.  Hopefully she continues to improve along the same trajectory.  She feels comfortable waiting until next week for her follow up to see if the injection will provide more relief of her pain.  Patient will call our  to reschedule her 2-week post procedure follow-up visit next week.  I recommend that this be an in person visit since her pain is multifactorial and I will need to perform a physical exam to determine next step in treatment.  She will not be charged for today's visit.

## 2021-06-13 NOTE — PROGRESS NOTES
Assessment:   Aby Giraldo is a 55 y.o. y.o. female with past medical history significant for morbid obesity, hypothyroidism, hyperlipidemia, type 2 diabetes mellitus,  who presents today for follow-up regarding chronic pain involving the low back, hips, legs.  Symptoms appear multifactorial.  Pain is thought to be primarily related to osteoarthritis of the hips.  She appears to be secondarily symptomatic from bilateral L5 radiculitis.  She does have bilateral lateral recess stenosis at L4-5 related to spondylolisthesis secondary to severe bilateral facet arthropathy.  Finally, she appears to be symptomatic from lower lumbar facet arthropathy/mild SI joint degenerative change.  Patient status post ultrasound-guided hip joint injections (left September 1, 2020 and right November 4, 2020) which have provided 100% relief of her hip pain and 60% relief of her pain overall.  She then underwent a bilateral L4-5 transforaminal epidural steroid injection December 2, 2020 which provided 80% relief of her back pain and significant improvement in the radicular pain down her leg (shooting pain into the great toe), however, she continues have significant pain in the hips and myofascial pain in the buttocks and thighs.  She does feel that physical therapy is helpful.       Plan:     A shared decision making plan was used.  The patient's values and choices were respected.  The following represents what was discussed and decided upon by the physician assistant and the patient.      1.  DIAGNOSTIC TESTS: I reviewed the MRI lumbar spine and MRI sacrum.  No further diagnostic tests were ordered.    2.  PHYSICAL THERAPY: Patient is currently in physical therapy.  She has had 5 sessions of far.  Encouraged to continued with physical therapy and the home exercises.    3.  MEDICATIONS: No changes are made to the patient's medications.  -Patient will continue gabapentin 200 mg 4 times daily.  -Patient can continue using cyclobenzaprine 10  mg 3 times daily as needed.  -Patient may continue taking ibuprofen as needed.      4.  INTERVENTIONS:    -No additional interventions were ordered.  Patient is pleased with the progress that she is making with physical therapy.  She has had several injections since September.  We will continue with conservative treatment for now.  -If low back pain or bilateral lower extremity L5 radicular pain were to return, we could repeat the bilateral L4-5 transforaminal epidural steroid injections.  -If hip pain returns, we could repeat the intra-articular hip joint injections.    5.  PATIENT EDUCATION: Patient is in agreement the above plan.  All questions were answered.  -I did tell the patient that if she fails to have further improvement in her pain, I would recommend a referral to orthopedics.    6.  FOLLOW-UP: I offered to schedule a follow-up visit with the patient.  She prefers to follow-up as needed.  If she has questions or concerns, she should not hesitate to call.    Subjective:     Aby Giraldo is a 55 y.o. female who presents today for follow-up regarding chronic pain involving the low back, hips, lower extremities.  Pain is thought to be multifactorial.  She underwent a left hip joint injection September 1, 2020.    She then underwent a right hip joint injection November 4, 2020.  Patient reports the hip injections have provided 100% relief of her pain localized to her hips and 60% relief of her pain overall.  She then underwent bilateral L4-5 transforaminal epidural steroid injections December 2, 2020 which provided 80% relief of her back pain and additional improvement in her leg pain.  She states that her current leg pain feels different than the leg pain she had before her injection.  Before her injection she had shooting pain that went all the way down her legs out her big toes.  Her pain now feels more muscular in nature and no longer reaches the feet.    Patient complains of more mild residual low back  pain.  Pain is typically worse on the right than the left, but today is worse on the left than the right.  Pain is at the lumbosacral junction.  She has pain in the bilateral buttocks, lateral hips, groin.  She feels muscular pain that extends primarily through the anterior thighs.  Occasionally she feels it extend into the lateral calf, but overall she is experiencing significantly less pain distal to the knees compared with before the injection.  She rates her pain today as a 2 out of 10.  At its worst it is a 9-10.  As best it is a 0-10.  Pain is worse in the morning.  Pain is alleviated with stretching, massage, heat, taking a hot bath, physical therapy exercises.  She denies any new symptoms since she was last seen.  She denies any numbness or tingling down the legs.  She feels generally weak in the lower extremities.    Patient is currently in physical therapy.  She has had 5 sessions of far.  She does her home exercises.  She has tried chiropractic treatment, ultrasound treatments, TENS unit, massage.  She is taking gabapentin 200 mg 4 times daily.  She uses ibuprofen as needed.  She takes cyclobenzaprine 3 times daily as needed.    Past medical history is reviewed and is unchanged.    Review of Systems:  Positive for weakness, difficulty with hand skills.  Negative for numbness/tingling, loss of bowel/bladder control, inability to urinate, headache, pain much worse at night, trip/stumble/falls, difficulty swallowing, fevers, unintentional weight loss.     Objective:   CONSTITUTIONAL:  Vital signs as above.  No acute distress.  The patient is well nourished and well groomed.  Patient is obese.  PSYCHIATRIC:  The patient is awake, alert, oriented to person, place and time.  The patient is answering questions appropriately with clear speech.  Normal affect.  HEENT: Normocephalic, atraumatic.  Sclera clear.    SKIN:  Skin over the face, posterior torso, bilateral upper and lower extremities is clean, dry, intact  without rashes.  Patient has K tape on her low back.  VASCULAR: No significant lower extremity edema.  MUSCULOSKELETAL:  Gait is Trendelenburg.  The patient has 5/5 strength for the bilateral hip flexors, knee flexors/extensors, ankle dorsiflexors/plantar flexors, ankle evertors/invertors.  Internal and external rotation of both hips is restricted with reproduction of pain localizing to the lateral hips and groin.  NEUROLOGICAL: 1+ patellar, achilles reflexes which are symmetric bilaterally.  No ankle clonus bilaterally.  Sensation to light touch is intact in the bilateral L4, L5, and S1 dermatomes.       RESULTS:    I reviewed the MRI lumbar spine from [facility] dated August 18, 2020.  At L4-5 there is a broad-based disc bulge and severe bilateral facet arthropathy with trace spondylolisthesis and mild spinal canal stenosis and mild bilateral lateral recess stenosis.  At L5-S1 there is a small broad-based disc bulge with moderate bilateral lateral recess stenosis.  At L3-4 there is also mild bilateral lateral recess stenosis related to a disc bulge and mild to moderate bilateral facet arthropathy.  MRI lumbar spine also shows moderate degenerative change left hip joint.     I reviewed the MRI sacrum from [facility] dated August 18, 2020.  This shows mild bilateral sacroiliac joint osteoarthritis.  No evidence of effusion, synovitis, erosive change, edema.  It also showed a probable exophytic fibroid posterior uterine wall with fluid and complex material present in the endometrium.     An ultrasound pelvis August 20, 2020 showed heterogeneous uterine endometrium and a heterogeneous 3.1 cm mass along the posterior margin of the uterus.  Patient has been referred to OB/GYN.

## 2021-06-13 NOTE — PROGRESS NOTES
[facility] Daily Progress     Patient Name: Aby Giraldo  Date: 12/18/2020  Visit #: 6/8-12  PTA visit #:    Referral Diagnosis:  Lumbar radicular pain [M54.16]  - Primary       Primary osteoarthritis of left hip [M16.12]       Hip pain, right [M25.551]       Lumbar facet arthropathy [M47.816]       Referring provider: Samantha Abdi PA-C  Visit Diagnosis:     ICD-10-CM    1. Osteoarthritis of both hips, unspecified osteoarthritis type  M16.0    2. DDD (degenerative disc disease), lumbar  M51.36    3. Hip pain, right  M25.551    4. Lumbar facet arthropathy  M47.816    5. Lumbar radicular pain  M54.16       Arthritis left hip less arthritis right hip  Growths in uterus benign.  L4,5 stenosis with narrowing, bulging  Pan hips laterally and anterior bilaterally upper thigh and lower leg and then into her great toe bilaterally. Primarily left more than right.  Muscles tight stretching chiropractor and massaging.  Will have an injection around December 11.  Since January 2020 have a muscle relaxant at night to help her sleep.    Assessment:   From Evaluation: bilateral hip OA, Lumbar radiculopathy, trend[place] gait.  Stiffness at T12 in both flexion and extension.  Weakness in the core.  Weakness in the gluteus medius bilaterally. Significant decrease in lumbar extension.      Pain started in January.  Pt started with Chiro in March.  Went to MD in June as pain was not improving - has has bilateral hip injections and low back injection.     Overall pt is noticing improvement. Will continue therapy 2x a week through end of year. Improved gait observed today with less pain.     Patient demonstrates understanding/independence with home program.  Patient is benefitting from skilled physical therapy and is making steady progress toward functional goals.  Patient is appropriate to continue with skilled physical therapy intervention, as indicated by initial plan of care.    Goal Status:  Pt. will  demonstrate/verbalize independence in self-management of condition in : 12 weeks;Comment  Comment:: To aid in managing her symptoms at home.  Pt. will be independent with home exercise program in : 12 weeks;Comment  Comment:: To aid in managing her symptoms at home.  Pt. will have improved quality of sleep: with less pain;getting 75-90% of required amount;in 12 weeks;Comment   Comment:: patient will be able to sleep better without use of muscle relaxant Continues to use muscle relaxer (20)  Pt. will show improved balance for safer : standing;for household ambulation;for chores;for work;in 12 weeks  Pt. will be able to walk : 10 minutes;with FWB;on even surfaces;2 blocks;with less pain;with less difficulty;for community mobility;for other activity;in 6 weeks;in 12 weeks  Other Activity:: to walk her dog without increased pain.  3 blocks total - has been getting easier   Patient will stand : 10 minutes;with less pain;with less difficultty;for home chores;in 6 weeks;in 12 weeks  Patient will transfer: sit/stand;for car;for in/out of bed;for toileting;for in/out of chair;with less pain;with less difficulty;in 12 weeks;Comment  Comment: decrease pain by 40-60 % - getting in an out of the car is hard but less painful. Uses UE to help get herself up from chair     No data recorded    Plan / Patient Education:     Continue with initial plan of care.  Progress with home program as tolerated.  Plan for next visit: Add in manual therapy for the back and hips as needed.  Go over posture and body mechanics.   Reassess hip flexor strength as needed. Review HEP and continue PROM for hips. AMBULATING   Look at getting in and out of her car.   Subjective:   Pt compliant with HEP   Getting massage 1x a week (was getting massage 1x a month but has increased it)   Pain Ratin -Left glut doing down left leg (anteriolateral) (the lowest it has been).   R side is better after stretching and using pulse massager at 3am this  "morning.   K-tape was helpful.   Back injection - last week.     Objective:   Nustep: 5 min - 6 resistance - no UE     Gait: WBOS, minimal knee bend, lateral shifting of shoulders has improved.   Less cues needed.  NBOS and heel to toe gait   IR of left foot. Pain in left hip with neutral foot position.     Past session: Right: 90 hip flexion, limited hip ER to about 20, IR to neutral - pain   Left: 100 hip flexion, limited hip ER to about 25, IR to neutral or 5 degrees - pain   Pt also has limited abduction - performed with 30 degree flexion to limit pain.     Discussed purchase of sock aid on [employer].  Pt unable to put on socks.  Will continue to progress hip flexion and ER. Able to perform modified figure-4.     K-Tape  Prior to application skin was cleaned with alcohol wipe  2 horizontal strips were applied to lumbosacral junction 5-6\" with mild stretch.  No stretch applied to 2\" ends of tape.   Pt was sitting during application.     Exercises:  Exercise #1: wall circles (mainla hoop) - not today  Comment #1: Modified figure-4 piriformis stretch 5 seconds x2-3 in supine and sitting  Exercise #2: Bridges x5 (2 sets)  Comment #2: pelvic tilts x10 in hooklying  Exercise #3: TA: hooklying pelvic tilt with 5 sec hold x5  Comment #3: TA: heel slides and butterflies 5x on each side - pt able to add in TA contraction today for the first time  Exercise #4: clams 2 sets x8-10 reps - nto today  Comment #4: LTR x20  Exercise #5: lay with one leg extended and one knee bend to stretch psoas x30 seconds - not today  Comment #5: Standing hip abd and ext 2 sets x8-10 reps  Exercise #6: mini squat 2 sets 8-10 reps  Comment #6: Standing HS curls 2 sets 8-10 reps  Exercise #7: heel raises 8-10 reps 2 sets  Comment #7: Quad stretch with leg off the table hold 20-30 seconds - not today  Exercise #8: SLR 1 set 5 reps - not today  Comment #8: SKTC 5 seconds x2-3 reps bilaterally  Added seated piriformis stretch and SKTC today.   Print " both next session.     Treatment Today    TREATMENT MINUTES COMMENTS   Evaluation     Self-care/ Home management     Manual therapy 28 Supine: PROM and stretch of bilateral hips  Hip flexion (pain on right side in anterior hip), IR and ER with 90 degrees flexion. HS stretch, ITB stretch, adductor stretch hold 30-60 seconds   Long axis traction sustained hold 5 seconds x20   (Discussed anatomy of hip/pelvis/low back with use of model)     Neuromuscular Re-education     Therapeutic Activity     Therapeutic Exercises 23 See flow sheet above    Gait training 8  See above   Modality__________________                Total 59    Blank areas are intentional and mean the treatment did not include these items.       Antonette Gutierrez, PT   12/18/2020

## 2021-06-13 NOTE — PATIENT INSTRUCTIONS - HE
Shift hips side to side please at this time do not move your shoulders do 10-30 reps     Wall circles ([place]) gently move your hips around in a [place] making sure that they stay facing forward the whole time.

## 2021-06-13 NOTE — PROGRESS NOTES
[facility] Daily Progress     Patient Name: Aby Giraldo  Date: 12/16/2020  Visit #: 5/8-12  PTA visit #:    Referral Diagnosis:  Lumbar radicular pain [M54.16]  - Primary       Primary osteoarthritis of left hip [M16.12]       Hip pain, right [M25.551]       Lumbar facet arthropathy [M47.816]       Referring provider: Samantha Abdi PA-C  Visit Diagnosis:     ICD-10-CM    1. Osteoarthritis of both hips, unspecified osteoarthritis type  M16.0    2. DDD (degenerative disc disease), lumbar  M51.36    3. Hip pain, right  M25.551       Arthritis left hip less arthritis right hip  Growths in uterus benign.  L4,5 stenosis with narrowing, bulging  Pan hips laterally and anterior bilaterally upper thigh and lower leg and then into her great toe bilaterally. Primarily left more than right.  Muscles tight stretching chiropractor and massaging.  Will have an injection around December 11.  Since January 2020 have a muscle relaxant at night to help her sleep.    Assessment:   From Evaluation: bilateral hip OA, Lumbar radiculopathy, [place] gait.  Stiffness at T12 in both flexion and extension.  Weakness in the core.  Weakness in the gluteus medius bilaterally. Significant decrease in lumbar extension.      Pain started in January.  Pt started with Chiro in March.  Went to MD in June as pain was not improving - has has bilateral hip injections and has an injection scheduled today for low back.     Overall pt is noticing improvement. Will continue therapy 2x a week through end of year.     Patient demonstrates understanding/independence with home program.  Patient is benefitting from skilled physical therapy and is making steady progress toward functional goals.  Patient is appropriate to continue with skilled physical therapy intervention, as indicated by initial plan of care.    Goal Status:  Pt. will demonstrate/verbalize independence in self-management of condition in : 12 weeks;Comment  Comment:: To aid  in managing her symptoms at home.  Pt. will be independent with home exercise program in : 12 weeks;Comment  Comment:: To aid in managing her symptoms at home.  Pt. will have improved quality of sleep: with less pain;getting 75-90% of required amount;in 12 weeks;Comment  Comment:: patient will be able to sleep better without use of muscle relaxant  Pt. will show improved balance for safer : standing;for household ambulation;for chores;for work;in 12 weeks  Pt. will be able to walk : 10 minutes;with FWB;on even surfaces;2 blocks;with less pain;with less difficulty;for community mobility;for other activity;in 6 weeks;in 12 weeks  Other Activity:: to walk her dog without increased pain.  Patient will stand : 10 minutes;with less pain;with less difficultty;for home chores;in 6 weeks;in 12 weeks  Patient will transfer: sit/stand;for car;for in/out of bed;for toileting;for in/out of chair;with less pain;with less difficulty;in 12 weeks;Comment  Comment: decrease pain by 40-60 %    No data recorded    Plan / Patient Education:     Continue with initial plan of care.  Progress with home program as tolerated.  Plan for next visit: Add in manual therapy for the back and hips as needed.  Go over posture and body mechanics,  Reassess hip flexor strength as needed. Review HEP and continue PROM for hips. AMBULATING   Subjective:   Pt compliant with HEP   Pain Rating:3 - in R hip and low back area.   K-tape was helpful.   Back injection - last week. No change in symptoms.     Objective:   Nustep: 5 min - 6 resistance   Exercises:  Exercise #1: wall circles (hula hoop) - not today  Comment #1: Modified figure-4 piriformis stretch 5 seconds x2-3  Exercise #2: Bridges x5 (2 sets)  Comment #2: pelvic tilts x10 in hooklying  Exercise #3: TA: hooklying pelvic tilt with 5 sec hold x5  Comment #3: TA: heel slides and butterflies 5x on each side - pt able to add in TA contraction today for the first time  Exercise #4: clams 2 sets x8-10  "reps  Comment #4: LTR x20  Exercise #5: lay with one leg extended and one knee bend to stretch psoas x30 seconds - not today  Comment #5: Standing hip abd and ext 2 sets x8-10 reps  Exercise #6: mini squat 2 sets 8-10 reps  Comment #6: Standing HS curls 2 sets 8-10 reps  Exercise #7: heel raises 8-10 reps 2 sets  Comment #7: Quad stretch with leg off the table hold 20-30 seconds - not today  Exercise #8: SLR 1 set 5 reps      Clams have gotten easier.  Difficulty with standing abd on right and butterfly in supine on right.     Gait: WBOS, minimal knee bend, lateral shifting of shoulders.   Cues for NBOS and heel to toe gait   Practiced marching walk.   Starting to normalized gait with NBOS.     Pt performing \"butterfly\" and heel slides without TA contraction.   Pt able to demo ex's with minimal cueing needed.       Past session: Right: 90 hip flexion, limited hip ER to about 20, IR to neutral - pain   Left: 100 hip flexion, limited hip ER to about 25, IR to neutral or 5 degrees - pain   Pt also has limited abduction - performed with 30 degree flexion to limit pain.     Discussed purchase of sock aid on [employer].  Pt unable to put on socks.  Will continue to progress hip flexion and ER. Able to perform modified figure-4.     K-Tape  Prior to application skin was cleaned with alcohol wipe  2 horizontal strips were applied to lumbosacral junction 5-6\" with mild stretch.  No stretch applied to 2\" ends of tape.   Pt was sitting during application.       Treatment Today    TREATMENT MINUTES COMMENTS   Evaluation     Self-care/ Home management     Manual therapy 22 Supine: PROM and stretch of R hip    Hip flexion, IR and ER with 90 degrees flexion     Neuromuscular Re-education     Therapeutic Activity     Therapeutic Exercises 23 See flow sheet above    Gait training 8  See above   Modality__________________                Total 53    Blank areas are intentional and mean the treatment did not include these items. "       Antonette Gutierrez, PT   12/16/2020

## 2021-06-13 NOTE — PATIENT INSTRUCTIONS - HE
A bilateral L4/5 epidural steroid injection has been ordered today.      Please note that this injection uses cortisone.  The cortisone may somewhat weaken the immune system.  It is unknown how much the immune system is weakened.  It is unknown if it is weakened to the point that you may be more likely to get the COVID-19 virus, or if you do get the COVID-19 virus, if you would be sicker than you would have been if you had not had the cortisone injection.  If you do not wish to proceed with the injection, please let the nurse/physician know and do NOT schedule the injection.    Please note that since your immune system is weakened from the cortisone, having a flu vaccine/shot may be less effective if you have this vaccine within 2 weeks from your cortisone injection.  It is advised to wait 2 weeks after your cortisone injection to have the flu shot (or if you have the flu shot first, wait 2 weeks before you have the cortisone injection).    Please schedule this injection at least 1 week  from now to allow time for insurance prior authorization.  On the day of your injection, you cannot be sick or taking antibiotics.  If you become sick and are prescribed, please call the clinic so your injection can be rescheduled for once you have completed your antibiotics.  You will need to bring a  with you for your injection.   If you have any questions or concerns prior to your injection, please do not hesitate to call the nurse navigation line at [phone] or contact Samantha Abdi through [employer].          Gabapentin 100mg Dosing Chart    DATE  MORNING AFTERNOON BEDTIME    Day 1 0 0 1    Day 2 0 0 1    Day 3 0 0 1    Day 4 1 0 1    Day 5 1 0 1    Day 6 1 0 1    Day 7 1 1 1    Day 8 1 1 1    Day 9 1 1 1    Day 10 1 1 2    Day 11 1 1 2    Day 12 1 1 2    Day 13 2 1 2    Day 14 2 1 2    Day 15 2 1 2    Day 16 2 2 2    Day 17 2 2 2    Day 18 2 2 2    Day 19 2 2 3    Day 20 2 2 3    Day 21 2 2 3    Day 22 3 2 3    Day 23 3 2  3    Day 24 3 2 3    Day 25 3 3 3    Day 26 3 3 3    Day 27 3 3 3     Continue medication, taking 3 capsules three times daily    Please call if you have any questions regarding how to take your medication  Clinic Phone # [phone]

## 2021-06-13 NOTE — PROGRESS NOTES
[facility] Daily Progress     Patient Name: Aby Giraldo  Date: 12/21/2020  Visit #: 7/8-12  PTA visit #:    Referral Diagnosis:  Lumbar radicular pain [M54.16]  - Primary       Primary osteoarthritis of left hip [M16.12]       Hip pain, right [M25.551]       Lumbar facet arthropathy [M47.816]       Referring provider: Samantha Abdi PA-C  Visit Diagnosis:     ICD-10-CM    1. Osteoarthritis of both hips, unspecified osteoarthritis type  M16.0    2. DDD (degenerative disc disease), lumbar  M51.36    3. Hip pain, right  M25.551    4. Lumbar facet arthropathy  M47.816    5. Lumbar radicular pain  M54.16       Arthritis left hip less arthritis right hip  Growths in uterus benign.  L4,5 stenosis with narrowing, bulging  Pan hips laterally and anterior bilaterally upper thigh and lower leg and then into her great toe bilaterally. Primarily left more than right.  Muscles tight stretching chiropractor and massaging.  Will have an injection around December 11.  Since January 2020 have a muscle relaxant at night to help her sleep.    Assessment:   From Evaluation: bilateral hip OA, Lumbar radiculopathy, [place] gait.  Stiffness at T12 in both flexion and extension.  Weakness in the core.  Weakness in the gluteus medius bilaterally. Significant decrease in lumbar extension.      Pain started in January.  Pt started with Chiro in March.  Went to MD in June as pain was not improving - has has bilateral hip injections and low back injection.     Overall pt is noticing improvement. Will continue therapy 2x a week through end of year. Improved gait observed today with less pain.     Patient demonstrates understanding/independence with home program.  Patient is benefitting from skilled physical therapy and is making steady progress toward functional goals.  Patient is appropriate to continue with skilled physical therapy intervention, as indicated by initial plan of care.    Goal Status:  Pt. will  demonstrate/verbalize independence in self-management of condition in : 12 weeks;Comment  Comment:: To aid in managing her symptoms at home.  Pt. will be independent with home exercise program in : 12 weeks;Comment  Comment:: To aid in managing her symptoms at home.  Pt. will have improved quality of sleep: with less pain;getting 75-90% of required amount;in 12 weeks;Comment   Comment:: patient will be able to sleep better without use of muscle relaxant Continues to use muscle relaxer (12/18/20)  Pt. will show improved balance for safer : standing;for household ambulation;for chores;for work;in 12 weeks  Pt. will be able to walk : 10 minutes;with FWB;on even surfaces;2 blocks;with less pain;with less difficulty;for community mobility;for other activity;in 6 weeks;in 12 weeks  Other Activity:: to walk her dog without increased pain.  3 blocks total - has been getting easier   Patient will stand : 10 minutes;with less pain;with less difficultty;for home chores;in 6 weeks;in 12 weeks  Patient will transfer: sit/stand;for car;for in/out of bed;for toileting;for in/out of chair;with less pain;with less difficulty;in 12 weeks;Comment  Comment: decrease pain by 40-60 % - getting in an out of the car is hard but less painful. Uses UE to help get herself up from chair     No data recorded    Plan / Patient Education:     Continue with initial plan of care.  Progress with home program as tolerated.  Plan for next visit: Add in manual therapy for the back and hips as needed.  Go over posture and body mechanics.   Reassess hip flexor strength as needed. Review HEP and continue PROM for hips. AMBULATING   Look at getting in and out of her car.   Subjective:   Increased pain in hip/ tightness in hips this morning. Able to cut own toe nails for the first time in 1 year.   Over did it on Saturday - in pain today.   Pt compliant with HEP   Getting massage 1x a week (was getting massage 1x a month but has increased it)   Pain Rating:  "5 -Left glut doing down left leg (anteriolateral) and right hip socket.     K-tape was helpful.     Objective:   Nustep: 5 min - 6 resistance - no UE     Gait: WBOS, minimal knee bend, lateral shifting of shoulders has improved.   Less cues needed.  NBOS and heel to toe gait   IR of left foot. Pain in left hip with neutral foot position.     Past session: Right: 90 hip flexion, limited hip ER to about 20, IR to neutral - pain   Left: 100 hip flexion, limited hip ER to about 25, IR to neutral or 5 degrees - pain   Pt also has limited abduction - performed with 30 degree flexion to limit pain.     Discussed purchase of sock aid on [employer].  Pt unable to put on socks.  Will continue to progress hip flexion and ER. Able to perform modified figure-4.     Not today: K-Tape  Prior to application skin was cleaned with alcohol wipe  2 horizontal strips were applied to lumbosacral junction 5-6\" with mild stretch.  No stretch applied to 2\" ends of tape.   Pt was sitting during application.     Exercises:  Exercise #1: wall circles (hula hoop) - not today  Comment #1: Modified figure-4 piriformis stretch 5 seconds x2-3 in supine and sitting  Exercise #2: Bridges x5 (2 sets)  Comment #2: pelvic tilts x10 in hooklying  Exercise #3: TA: hooklying pelvic tilt with 5 sec hold x5  Comment #3: TA: heel slides and butterflies 5x on each side - pt able to add in TA contraction today for the first time  Exercise #4: clams 2 sets x8-10 reps - nto today  Comment #4: LTR x20  Exercise #5: lay with one leg extended and one knee bend to stretch psoas x30 seconds - not today  Comment #5: Standing hip abd and ext 2 sets x8-10 reps  Exercise #6: mini squat 2 sets 8-10 reps  Comment #6: Standing HS curls 2 sets 8-10 reps  Exercise #7: heel raises 8-10 reps 2 sets  Comment #7: Quad stretch with leg off the table hold 20-30 seconds - not today  Exercise #8: SLR 1 set 5 reps - not today  Comment #8: SKTC 5 seconds x2-3 reps bilaterally    Treatment " Today    TREATMENT MINUTES COMMENTS   Evaluation     Self-care/ Home management     Manual therapy 43 Supine: PROM and stretch of bilateral hips  Hip flexion, IR and ER with 90 degrees flexion. HS stretch, adductor stretch (on right only)  hold 30-60 seconds   Long axis traction sustained hold 5 seconds x10 on right only   S/L: right hip flexor stretch and STM to right hip   Symptoms consistent with hip bursitis on R - recommended ice on right hip.  Can continue to use heat on low back     Neuromuscular Re-education     Therapeutic Activity     Therapeutic Exercises 10 See flow sheet above    Gait training  See above   Modality__________________                Total 53    Blank areas are intentional and mean the treatment did not include these items.       Antonette Gutierrez, PT   12/21/2020

## 2021-06-13 NOTE — PROGRESS NOTES
Assessment:   Aby Giraldo is a 55 y.o. y.o. female with past medical history significant for morbid obesity, hypothyroidism, hyperlipidemia, type 2 diabetes mellitus,  who presents today for follow-up regarding chronic pain involving the low back, hips, legs.  Symptoms appear multifactorial.  Pain is thought to be primarily related to osteoarthritis of the hips.  She appears to be secondarily symptomatic from bilateral L5 radiculitis.  She does have bilateral lateral recess stenosis at L4-5 related to spondylolisthesis secondary to severe bilateral facet arthropathy.  Finally, she appears to be symptomatic from lower lumbar facet arthropathy/mild SI joint degenerative change.  Patient status post ultrasound-guided hip joint injections (left September 1, 2020 and right November 4, 2020) which have provided 100% relief of her hip pain and 60% relief of her pain overall.  She continues have pain radiating down the bilateral lower extremities in an L5 pattern.  She also continues have axial low back pain.       Plan:     A shared decision making plan was used.  The patient's values and choices were respected.  The following represents what was discussed and decided upon by the physician assistant and the patient.      1.  DIAGNOSTIC TESTS: I reviewed the MRI lumbar spine and MRI sacrum.  No further diagnostic tests were ordered.    2.  PHYSICAL THERAPY: I had referred the patient to physical therapy August 24, 2020.  Patient has not started physical therapy.  She would like to start physical therapy now.  I entered a new referral.      3.  MEDICATIONS:    -Gabapentin 100 mg was prescribed.  She was given the dosage titration chart.  She may increase her dose up to maximum of 300 mg 3 times daily.  Risks reviewed.  -Patient can continue using cyclobenzaprine 10 mg at bedtime as needed.  -Patient may continue taking ibuprofen as needed.      4.  INTERVENTIONS:    -I offered a bilateral L4-5 transforaminal epidural  steroid injection as the next step.  Patient is most concerned about the pain radiating all the way down her legs. She requests that the injection is performed by Dr. Vora, who performed her hip joint injections. The injection will use cortisone.  Cortisone weakens/suppresses the immune system so it does not function as well as it normally does.  We do not know if this could make it more likely that you could get the COVID-19 virus or if you do have the virus, if you are going to be sicker than you would have been if you hadn't had the cortisone injection.  Patient was willing to accept this risk and the order for the injection was placed.  -If that did not help, would recommend a bilateral L5-S1 transforaminal epidural steroid injection.  - If axial low back pain persist, I would recommend bilateral L3, L4, L5 medial branch blocks to determine if the pain is facet mediated.  -If she had a positive response to the medial branch blocks, we could try bilateral L4-5, L5-S1 facet joint injections.  -If she fails the medial branch blocks, we could try bilateral sacroiliac joint injections.  -If hip pain returns, we could repeat the intra-articular hip joint injections.    5.  PATIENT EDUCATION: Patient is in agreement the above plan.  All questions were answered.  -The patient I discussed the importance of working toward a more ideal body weight both for her back pain and for her overall health.    6.  FOLLOW-UP: Patient should have a 2-week post procedure follow-up visit with me.  If she has questions or concerns in the meantime, she should not hesitate to call.    Subjective:     Aby Giraldo is a 55 y.o. female who presents today for follow-up regarding chronic pain involving the low back, hips, lower extremities.  Pain is thought to be multifactorial.  She underwent a left hip joint injection September 1, 2020.    She then underwent a right hip joint injection November 4, 2020.  Patient reports the hip  injections have provided 100% relief of her pain localized to her hips and 60% relief of her pain overall.    Patient complains of persistent bilateral low back pain.  Pain spans across low back in a broadband intubation at the lumbosacral junction.  Pain then radiates into the lateral hips, down the lateral thighs, into the lateral calves, extending into the dorsal feet and ending in the big toes.  Patient reports that leg pain is more bothersome than back pain.  She rates her pain today as a 4-10.  At its worst it is a 10 out of 10.  At its best it is a 3 out of 10.  Pain is aggravated with walking.  Pain is alleviated with stretching, applying heat, taking a bath.  She denies any new symptoms since she was last seen.  She denies any numbness or tingling down the legs.  She describes a sensation of weakness in her legs.  She states that her legs feel unsteady when she walks.    Patient has not had formal physical therapy.  I referred the patient physical therapy August 24.  She did not follow through with that.  She would like to begin physical therapy now. She has tried chiropractic treatment, ultrasound treatments, TENS unit, massage.  She is taking ibuprofen as needed for pain.  She also takes cyclobenzaprine at bedtime.  Medications are somewhat helpful.    Past medical history is reviewed and is unchanged.    Review of Systems:  Positive for weakness, difficulty with hand skills.  Negative for numbness/tingling, loss of bowel/bladder control, inability to urinate, headache, pain much worse at night, trip/stumble/falls, difficulty swallowing, fevers, unintentional weight loss.     Objective:   CONSTITUTIONAL:  Vital signs as above.  No acute distress.  The patient is well nourished and well groomed.    PSYCHIATRIC:  The patient is awake, alert, oriented to person, place and time.  The patient is answering questions appropriately with clear speech.  Normal affect.  HEENT: Normocephalic, atraumatic.  Sclera  clear.    SKIN:  Skin over the face, posterior torso, bilateral upper and lower extremities is clean, dry, intact without rashes.  VASCULAR: No significant lower extremity edema.  MUSCULOSKELETAL:  Gait is antalgic.  Mild tenderness over the bilateral lower lumbar paraspinal muscles.      The patient has 5/5 strength for the bilateral hip flexors, knee flexors/extensors, ankle dorsiflexors/plantar flexors, ankle evertors/invertors.    NEUROLOGICAL: 1+ patellar, achilles reflexes which are symmetric bilaterally.  No ankle clonus bilaterally.  Sensation to light touch is intact in the bilateral L4, L5, and S1 dermatomes.       RESULTS:    I reviewed the MRI lumbar spine from [facility] dated August 18, 2020.  At L4-5 there is a broad-based disc bulge and severe bilateral facet arthropathy with trace spondylolisthesis and mild spinal canal stenosis and mild bilateral lateral recess stenosis.  At L5-S1 there is a small broad-based disc bulge with moderate bilateral lateral recess stenosis.  At L3-4 there is also mild bilateral lateral recess stenosis related to a disc bulge and mild to moderate bilateral facet arthropathy.  MRI lumbar spine also shows moderate degenerative change left hip joint.     I reviewed the MRI sacrum from [facility] dated August 18, 2020.  This shows mild bilateral sacroiliac joint osteoarthritis.  No evidence of effusion, synovitis, erosive change, edema.  It also showed a probable exophytic fibroid posterior uterine wall with fluid and complex material present in the endometrium.     An ultrasound pelvis August 20, 2020 showed heterogeneous uterine endometrium and a heterogeneous 3.1 cm mass along the posterior margin of the uterus.  Patient has been referred to OB/GYN.

## 2021-06-14 NOTE — PROGRESS NOTES
[facility] Daily Progress     Patient Name: Aby Giraldo  Date: 12/28/2020  Visit #: 9/8-12  PTA visit #:    Referral Diagnosis:  Lumbar radicular pain [M54.16]  - Primary       Primary osteoarthritis of left hip [M16.12]       Hip pain, right [M25.551]       Lumbar facet arthropathy [M47.816]       Referring provider: Samantha Abdi PA-C  Visit Diagnosis:     ICD-10-CM    1. Osteoarthritis of both hips, unspecified osteoarthritis type  M16.0    2. DDD (degenerative disc disease), lumbar  M51.36    3. Hip pain, right  M25.551       Arthritis left hip less arthritis right hip  Growths in uterus benign.  L4,5 stenosis with narrowing, bulging  Pan hips laterally and anterior bilaterally upper thigh and lower leg and then into her great toe bilaterally. Primarily left more than right.  Muscles tight stretching chiropractor and massaging.  Will have an injection around December 11.  Since January 2020 have a muscle relaxant at night to help her sleep.    Assessment:   From Evaluation: bilateral hip OA, Lumbar radiculopathy, [place] gait.  Stiffness at T12 in both flexion and extension.  Weakness in the core.  Weakness in the gluteus medius bilaterally. Significant decrease in lumbar extension.      Pain started in January.  Pt started with Chiro in March.  Went to MD in June as pain was not improving - has has bilateral hip injections and low back injection.     Pt feeling very discouraged today.  Told me she would throw out any additional exercises I gave her.  Recommended she bring in all her ex's next session and we can consolidate. Pt has improved her ambulation and flexibility.  She continues to have pain with walking and is not improving as quickly as she would like.     Patient demonstrates understanding/independence with home program.  Patient is benefitting from skilled physical therapy and is making steady progress toward functional goals.  Patient is appropriate to continue with skilled  physical therapy intervention, as indicated by initial plan of care.    Goal Status:  Pt. will demonstrate/verbalize independence in self-management of condition in : 12 weeks;Comment  Comment:: To aid in managing her symptoms at home.  Pt. will be independent with home exercise program in : 12 weeks;Comment  Comment:: To aid in managing her symptoms at home.  Pt. will have improved quality of sleep: with less pain;getting 75-90% of required amount;in 12 weeks;Comment   Comment:: patient will be able to sleep better without use of muscle relaxant Continues to use muscle relaxer (20)  Pt. will show improved balance for safer : standing;for household ambulation;for chores;for work;in 12 weeks  Pt. will be able to walk : 10 minutes;with FWB;on even surfaces;2 blocks;with less pain;with less difficulty;for community mobility;for other activity;in 6 weeks;in 12 weeks  Other Activity:: to walk her dog without increased pain.  3 blocks total - has been getting easier   Patient will stand : 10 minutes;with less pain;with less difficultty;for home chores;in 6 weeks;in 12 weeks  Patient will transfer: sit/stand;for car;for in/out of bed;for toileting;for in/out of chair;with less pain;with less difficulty;in 12 weeks;Comment  Comment: decrease pain by 40-60 % - getting in an out of the car is hard but less painful. Uses UE to help get herself up from chair     No data recorded    Plan / Patient Education:     Continue with initial plan of care.  Progress with home program as tolerated.     Plan for next visit: assess goals. Assess HEP   Subjective:   A lot of soreness from left hip down ITB. Pt has been doing more exercises and walking more.   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  Pt has been working from home. Pt is suppose to go back on site in January but doesn't feel like she can.  Pt is a  and works in an office. Pt was last on site in October.   Pain Ratin   Pt goes to bed at night  "in pain.    Her goal is to walk without pain.     K-tape was helpful.     Objective:   Nustep: 6.5 min - 6 resistance - no UE   Discussed purchase of Nustep at home for aerobic ex's - last session    Gait: WBOS, minimal knee bend, lateral shifting of shoulders has improved slightly   Cues for heel to toe gait   IR of left foot. Inversion of feet.   Forward and Retro.  Looking in mirror.  Practicing hip shifting in static standing. Walking without excessive lateral lean with shoulders.     K-tape:   Prior to application skin was cleaned with alcohol wipe  2 horizontal strips were applied to lumbosacral junction 5-6\" with mild stretch.  No stretch applied to 2\" ends of tape.   Pt was sitting during application.     Discussed last session:   Routine: very sore in the morning - pt performs 3 stretches- lumbar flexion in standing, ITB stretch in standing and standing lunge  In the shower does hula hoop and hip mobility  Throughout the day if she is getting tight she does more stretches and does more walking   At 7:00pm does the strengthening ex's that I gave her in standing  At night she does her supine, S/L exercises.     Exercises:  Exercise #1: wall circles (hula hoop)  Comment #1: Modified figure-4 piriformis stretch 5 seconds x2-3 in supine and sitting  Exercise #2: Bridges x5 (2 sets)  Comment #2: pelvic tilts x10 in hooklying - not today  Exercise #3: TA: hooklying pelvic tilt with 5 sec hold x5 - not today  Comment #3: TA: heel slides and butterflies 5x on each side - pt able to add in TA contraction today for the first time - not today  Exercise #4: clams 2 sets x8-10 reps - not today  Comment #4: LTR x20 - not today  Exercise #5: lay with one leg extended and one knee bend to stretch psoas x30 seconds - R only  Comment #5: Standing hip abd and ext 2 sets x8-10 reps - cues needed tactile and verbal  Exercise #6: mini squat 2 sets 8-10 reps- added sit to stand x8  Comment #6: Standing HS curls 2 sets 8-10 " reps  Exercise #7: heel raises 8-10 reps 2 sets not today  Comment #7: Quad stretch with leg off the table hold 20-30 seconds - not today  Exercise #8: SLR 1 set 5 reps  Comment #8: SKTC 5 seconds x2-3 reps bilaterally - change angle of pull - to contralateral shoulder and ipsilateral  Exercise #9: SLS 30 seconds  Comment #9: single leg heel raises x3-8 (increased difficulty on R side)  Exercise #10: HS stretch with PTB hold 10-20 seconds / ITB stetch on left  Comment #10: HS stretch in supine hold 5 seconds x2  Exercise #11: HS stretch in sitting hold 20 seconds  Pt reports she has not been performing very many of her supine exercises including bridges and SLR     Treatment Today    TREATMENT MINUTES COMMENTS   Evaluation     Self-care/ Home management     Manual therapy 12 Supine: PROM and stretch of bilateral hips  Hip flexion, ER with 90 degrees flexion.   Hamstring stretch bilaterally, ITB stretch bilaterally.    Neuromuscular Re-education     Therapeutic Activity     Therapeutic Exercises 30 See flow sheet above    Gait training 8  See above   Modality__________________                Total 50    Blank areas are intentional and mean the treatment did not include these items.       Antonette Gutierrez, PT   12/28/2020     [facility] Discharge Summary  Patient Name: Aby Giraldo  Date: 3/31/2021  Referral Diagnosis: [unfilled]  Referring provider: Samantha Abdi PA-C  Visit Diagnosis:   1. Osteoarthritis of both hips, unspecified osteoarthritis type     2. DDD (degenerative disc disease), lumbar     3. Hip pain, right         Goals:Goal Status:  Pt. will demonstrate/verbalize independence in self-management of condition in : 12 weeks;Comment  Comment:: To aid in managing her symptoms at home.  Pt. will be independent with home exercise program in : 12 weeks;Comment  Comment:: To aid in managing her symptoms at home.  Pt. will have improved quality of sleep: with less pain;getting 75-90% of  required amount;in 12 weeks;Comment   Comment:: patient will be able to sleep better without use of muscle relaxant Continues to use muscle relaxer (12/18/20)  Pt. will show improved balance for safer : standing;for household ambulation;for chores;for work;in 12 weeks  Pt. will be able to walk : 10 minutes;with FWB;on even surfaces;2 blocks;with less pain;with less difficulty;for community mobility;for other activity;in 6 weeks;in 12 weeks  Other Activity:: to walk her dog without increased pain.  3 blocks total - has been getting easier   Patient will stand : 10 minutes;with less pain;with less difficultty;for home chores;in 6 weeks;in 12 weeks  Patient will transfer: sit/stand;for car;for in/out of bed;for toileting;for in/out of chair;with less pain;with less difficulty;in 12 weeks;Comment  Comment: decrease pain by 40-60 % - getting in an out of the car is hard but less painful. Uses UE to help get herself up from chair   No data recorded  No data recorded    Patient was seen for 9 visits from 11/24/20 to 12/28/20 with 1 missed appointments.  The patient discontinued therapy, did not return. Pt no showed her last scheduled appointment.     Therapy will be discontinued at this time.  The patient will need a new referral to resume.    Thank you for your referral.  Antonette Gutierrez  3/31/2021  7:36 AM

## 2021-06-14 NOTE — TELEPHONE ENCOUNTER
Last appointment: 12/18/2020  Next appointment: None    Notes/Comments:      Rx request(s) has been reviewed. Rx(s) cued up for auth, to be e-scribed to pharm. Thanks.

## 2021-06-16 ENCOUNTER — COMMUNICATION - HEALTHEAST (OUTPATIENT)
Dept: SURGERY | Facility: CLINIC | Age: 56
End: 2021-06-16
Payer: COMMERCIAL

## 2021-06-16 PROBLEM — E11.69 DIABETES MELLITUS TYPE 2 IN OBESE: Status: ACTIVE | Noted: 2018-03-02

## 2021-06-16 PROBLEM — E66.9 DIABETES MELLITUS TYPE 2 IN OBESE: Status: ACTIVE | Noted: 2018-03-02

## 2021-06-16 PROBLEM — M16.0 OSTEOARTHRITIS OF BOTH HIPS: Status: ACTIVE | Noted: 2020-10-21

## 2021-06-16 PROBLEM — M51.369 DDD (DEGENERATIVE DISC DISEASE), LUMBAR: Status: ACTIVE | Noted: 2020-10-21

## 2021-06-16 PROBLEM — N85.02 ENDOMETRIAL HYPERPLASIA WITH ATYPIA: Status: ACTIVE | Noted: 2020-10-21

## 2021-06-16 NOTE — PROGRESS NOTES
ASSESSMENT/ PLAN    1. Morbid obesity  BMI 49.6. Patient in a hurry today and didn't want to discuss this. I recommended to come back in about a month for annual physical exam.   - Comprehensive Metabolic Panel  - Glycosylated Hemoglobin A1c    2. Hypothyroidism  TSH normal range. Continue with current dose of synthroid 150 mcg daily  - Thyroid Cascade  - levothyroxine (SYNTHROID, LEVOTHROID) 150 MCG tablet; TAKE 1 TABLET BY MOUTH EVERY DAY AT 6AM AS DIRECTED  Dispense: 90 tablet; Refill: 0    3. Hyperlipidemia  LDL higher than 3 years ago. Will increase lovastatin to 40 mg/d.  - Lipid Cascade FASTING  - lovastatin (MEVACOR) 40 MG tablet; Take 1 tablet (40 mg total) by mouth daily.  Dispense: 90 tablet; Refill: 1    4. Nasal congestion  5. Plugged feeling in ear, right  No cerumen impaction, most likely stuffy nose and congestion leads to her symptoms. flonase given.   - fluticasone (FLONASE) 50 mcg/actuation nasal spray; 1 spray into each nostril daily.  Dispense: 16 g; Refill: 3    6. Colon cancer screening  Discussed cologuard. Patient will check with insurance to make sure it's covered. I will go ahead and order Cologuard and told patient that she will not be charged until she sends the kit back to the company.   - Cologuard    7. Diabetes mellitus type 2 in obese  A1C 6.5 today. I called patient over the phone and discussed result. Advised starting metformin and I gave her instruction over the phone as well as in the prescription to titrate up to max dose. Also will start her on ASA 81 mg daily. I increased lovastatin to 40 mg/d. Discussed importance of diet and exercise and weight loss. Patient willing to go see diabetes educator. Return to clinic in a month to see Dr. Kelsey for annual physical and follow up of diabetes. Patient agreed with plan on the phone.  - aspirin 81 MG EC tablet; Take 1 tablet (81 mg total) by mouth daily.  Dispense: 150 tablet; Refill: 2  - Ambulatory referral to Diabetes Education  Program (New Diagnosis)  - metFORMIN (GLUCOPHAGE) 500 MG tablet; Take 2 tablets (1,000 mg total) by mouth 2 (two) times a day with meals.  Dispense: 120 tablet; Refill: 3      This note was created using Dragon dictation software, spelling errors may occur.     Latricia Ly MD        SUBJECTIVE   Aby Giraldo is a 52 y.o. old female with a past medical history of morbid obesity, hypothyroidism, hyperlipidemia who presented to clinic today for further evaluation of med check, rash on hand, and ear plugged.   Med check: would need lab work and medications refilled. She's fasting. Last annual physical was more than 2 years ago. Also needs colon cancer screening and interested in Cologuard however she needs to check with her insurance about Cologuard.   Plugged feelings of both ears: would like to see if there's wax. Right ear is worse than left. She experiences some nasal drainage and nasal congestion as well.   Also concern about a rash on her dorsal hands bilaterally, left worse than right. Not itchy. Apparently was seen at [facility] in 11/2017, and per my chart review patient was given oral prednisone for the rash which improved her symptoms. She would like another course of prednisone today. Rash isn't itchy, doesn't bother her at all, just looks a little bit red.   Otherwise she has no concern or question.      Review of Systems:  A 12 point comprehensive review of systems was negative except as noted in HPI.    The following portions of the patient's history were reviewed and updated as appropriate: past medical history, past surgical history, family history, allergies, current medications and problem list.    Medical History  Active Ambulatory (Non-Hospital) Problems    Diagnosis     Recurrent sinus infections     Morbid Obesity     Allergies     Verruca Warts     Abdominal Pain     Hypothyroidism     Hyperlipidemia     Blood In Urine     Prediabetes     Adult Sleep Apnea     No past medical history on  file.    Surgical History  She  has a past surgical history that includes pr removal gallbladder.    Social History  Reviewed, and she  reports that she has never smoked. She has never used smokeless tobacco.    Family History  Reviewed, and family history is not on file.    Medications  Reviewed and reconciled    Allergies  Allergies   Allergen Reactions     Simvastatin Diarrhea         OBJECTIVE  Physical Exam:  Vital signs: /86 (Patient Site: Right Arm, Patient Position: Sitting, Cuff Size: Adult Large)  Pulse 92  Temp 97.3  F (36.3  C) (Oral)   Resp 18  Wt (!) 312 lb 8 oz (141.7 kg)  BMI 49.68 kg/m2  Weight: (!) 312 lb 8 oz (141.7 kg)    General appearance: pleasant, appears stated age, cooperative and in no distress, morbidly obese  Eyes: EOMs intact, PERRL, conjunctivae normal.   ENT: moist oral mucosa, posterior oropharynx normal, TMs not erythematous but right TM mildly bulging, no cerumen impaction.   Lymph: no cervical/supraclavicular adenopathy  Respiratory: clear to auscultation bilaterally, good air movement throughout, no wheezing or crackles, speaking full sentences without difficulty  Cardiovascular: regular rate and rhythm, no murmur appreciated, no leg edema  Skin: small mildly erythematous faint papules over the dorsal aspect of left hand, not itchy.   Neuro: alert oriented x 3, grossly normal otherwise  Psych: normal affect, appropriate conversation

## 2021-06-18 NOTE — PATIENT INSTRUCTIONS - HE
Patient Instructions by Antonette Gutierrez PT at 12/21/2020  7:30 AM     Author: Antonette Gutierrez PT Service: -- Author Type: Physical Therapist    Filed: 12/21/2020  8:22 AM Encounter Date: 12/21/2020 Status: Signed    : Antonette Gutierrez PT (Physical Therapist)          PIRIFORMIS AND HIP STRETCH - SEATED    While sitting in a chair, cross your affected leg on top of the other as shown.     Next, gently lean forward until a stretch is felt along the crossed leg.      SINGLE KNEE TO CHEST STRETCH - SKTC    While Lying on your back,  hold your knee and gently pull it up towards your chest.    5 seconds 2-3 reps      Alternate:

## 2021-06-18 NOTE — PATIENT INSTRUCTIONS - HE
Patient Instructions by Antonette Gutierrez PT at 12/28/2020  7:00 AM     Author: Antonette Gutierrez PT Service: -- Author Type: Physical Therapist    Filed: 12/28/2020  7:35 AM Encounter Date: 12/28/2020 Status: Signed    : Antonette Gutierrez PT (Physical Therapist)        HAMSTRING STRETCH WITH TOWEL    While lying down on your back, hook a towel or strap under  your foot and draw up your leg until a stretch is felt under your leg. calf area.    Keep your knee in a straightened position during the stretch             SEATED HAMSTRING STRETCH    While seated, rest your heel on the floor with your knee straight and gently lean forward until a stretch is felt behind you knee/thigh.       HAMSTRING STRETCH - SUPINE    While lying on your back, raise up your leg and hold the back of your knee until a stretch is felt.

## 2021-06-18 NOTE — PATIENT INSTRUCTIONS - HE
Patient Instructions by Antonette Gutierrez PT at 12/9/2020  7:00 AM     Author: Antonette Gutierrez PT Service: -- Author Type: Physical Therapist    Filed: 12/9/2020 11:03 AM Encounter Date: 12/9/2020 Status: Addendum    : Antonette Gutierrez PT (Physical Therapist)    Related Notes: Original Note by Antonette Gutierrez PT (Physical Therapist) filed at 12/9/2020  7:46 AM          Side Leg Kicks    Kick leg out to the side and slowly bring back to center.    Perform     5  Reps - work up to 10    1  Times per day      Back Leg Kicks    Kick leg back as far as possible standing tall with your back upright.    Perform      5 Reps work up to 10     1 Times per day      Mini-squats    Holding a chair or counter with feet hip width apart slowly bend knees.  Keep your knees over your ankles, back straight and head up.    Perform     5-10  Reps    1 Times per day              Hamstring Curl    Laying on your stomach or standing upright slowly bend your knee as you bring your foot towards your buttock.    x5 up to 10 reps              STANDING HEEL RAISES    While standing, raise up on your toes as you lift your heels off the ground.    Work up to 10 reps 1x a day       Quad stretch off edge of bed    Lie down on your back with your leg off the edge of your bed.  Put a belt or leash around your ankle and use it to bend your knee back until a stretch is felt in the front of your thigh.     20-30 seconds                     STRAIGHT LEG RAISE - SLR    While lying or sitting, raise up your leg with a straight knee.  Keep the opposite knee bent with the foot planted to the ground.

## 2021-06-18 NOTE — PATIENT INSTRUCTIONS - HE
"Patient Instructions by Antonette Gutierrez PT at 12/16/2020  7:00 AM     Author: Antonette Gutierrez PT Service: -- Author Type: Physical Therapist    Filed: 12/16/2020  7:49 AM Encounter Date: 12/16/2020 Status: Signed    : Antonette Gutierrez PT (Physical Therapist)       Other exercises: 2 sets 8-10 reps      STRAIGHT LEG RAISE - SLR    While lying or sitting, raise up your leg with a straight knee.  Keep the opposite knee bent with the foot planted to the ground.    1 set 5 reps   Increased to 2 set 5 reps       BRIDGING    While lying on your back, tighten your lower abdominals, squeeze your buttocks and then raise your buttocks off the floor/bed as creating a \"Bridge\" with your body.    2 sets 5 reps       PIRIFORMIS STRETCH    While lying on your back with both knee bent, cross your affected leg on the other knee.     R ankle above L knee     5 seconds x2-3 reps                       "

## 2021-06-18 NOTE — PATIENT INSTRUCTIONS - HE
Patient Instructions by Antonette Gutierrez PT at 12/23/2020  7:00 AM     Author: Antonette Gutierrez PT Service: -- Author Type: Physical Therapist    Filed: 12/23/2020  7:30 AM Encounter Date: 12/23/2020 Status: Signed    : Antonette Gutierrez PT (Physical Therapist)        SIT TO STAND    While making sure you bend at the hip, SLOWLY sit down    Your chest may come forward over your toes, but your spine should stay in neutral as shown.    Arms crossed x8 (work up to 2 sets)              SINGLE LEG STANCE - SLS    Stand on one leg and maintain your balance.    30 seconds          STANDING HEEL RAISES    While standing, raise up on your toes as you lift your heels off the ground.    Single leg heel raises - as many as you can.

## 2021-06-18 NOTE — PATIENT INSTRUCTIONS - HE
Patient Instructions by Antonette Gutierrez PT at 12/2/2020  7:00 AM     Author: Antonette Gutierrez PT Service: -- Author Type: Physical Therapist    Filed: 12/2/2020  7:46 AM Encounter Date: 12/2/2020 Status: Incomplete    : Antonette Gutierrez PT (Physical Therapist)        LOWER TRUNK ROTATIONS - LTR    Lying on your back with your knees bent, gently move your knees side-to-side.      PELVIC TILT    While lying on your back, use your stomach muscles to press your spine downwards towards the ground. Do not move into a painful position.    Start with pelvic tilts x10   Continue but hold for 5 seconds with back down and belly in x10     BRACE- KNEE FALL OUT     While lying on your back with both knees bent, stabilize your spine by bracing your abdominal muscles. Hold this contraction as you slowly lower one knee to the side. Your pelvis should not move.    Back down belly in   x5 on each leg   Back down and belly in     BRACE HEEL SLIDES    While lying on your back with your knees bent,  slowly slide your heel foward on the floor/bed and then slide it back. Use your stomach muscles to keep your spine from moving.    Back down and belly in       CLAM SHELLS    While lying on your side with your knees bent, draw up the top knee while keeping contact of your feet together.    Do not let your pelvis roll back during the lifting movement.      Up and down 5x on each leg

## 2021-06-18 NOTE — PATIENT INSTRUCTIONS - HE
Patient Instructions by Joy Quezada NP at 8/12/2020  3:50 PM     Author: Joy Quezada NP Service: -- Author Type: Nurse Practitioner    Filed: 8/12/2020  4:06 PM Encounter Date: 8/12/2020 Status: Addendum    : Joy Quezada NP (Nurse Practitioner)    Related Notes: Original Note by Joy Quezada NP (Nurse Practitioner) filed at 8/12/2020  4:06 PM       Debrox drops for ear wax removal/softening      Flonase nasal spray for ear pressure  Sudafed as needed for ear pressure        Patient Education     Earache, No Infection (Adult)  Earaches can happen without an infection. This occurs when air and fluid build up behind the eardrum causing a feeling of fullness and discomfort and reduced hearing. This is called otitis media with effusion (OME) or serous otitis media. It means there is fluid in the middle ear. It is not the same as acute otitis media, which is typically from infection.  OME can happen when you have a cold if congestion blocks the passage that drains the middle ear. This passage is called the eustachian tube. OME may also occur with nasal allergies or after a bacterial middle ear infection.    The pain or discomfort may come and go. You may hear clicking or popping sounds when you chew or swallow. You may feel that your balance is off. Or you may hear ringing in the ear.  It often takes from several weeks up to 3 months for the fluid to clear on its own. Oral pain relievers and ear drops help if there is pain. Decongestants and antihistamines sometimes help. Antibiotics don't help since there is no infection. Your doctor may prescribe a nasal spray to help reduce swelling in the nose and eustachian tube. This can allow the ear to drain.  If your OME doesn't improve after 3 months, surgery may be used to drain the fluid and insert a small tube in the eardrum to allow continued drainage.  Because the middle ear fluid can become infected, it is important to watch for signs  of an ear infection which may develop later. These signs include increased ear pain, fever, or drainage from the ear.  Home care  The following guidelines will help you care for yourself at home:    You may use over-the-counter medicine as directed to control pain, unless another medicine was prescribed. If you have chronic liver or kidney disease or ever had a stomach ulcer or GI bleeding, talk with your doctor before using these medicines. Aspirin should never be used in anyone under 18 years of age who is ill with a fever. It may cause severe liver damage.    You may use over-the-counter decongestants such as phenylephrine or pseudoephedrine. But they are not always helpful. Don't use nasal spray decongestants more than 3 days. Longer use can make congestion worse. Prescription nasal sprays from your doctor don't typically have those restrictions.    Antihistamines may help if you are also having allergy symptoms.    You may use medicines such as guaifenesin to thin mucus and promote drainage.  Follow-up care  Follow up with your healthcare provider or as advised if you are not feeling better after 3 days.  When to seek medical advice  Call your healthcare provider right away if any of the following occur:    Your ear pain gets worse or does not start to improve     Fever of 100.4 F (38 C) or higher, or as directed by your healthcare provider    Fluid or blood draining from the ear    Headache or sinus pain    Stiff neck    Unusual drowsiness or confusion  Date Last Reviewed: 10/1/2016    1660-2661 [employer]. [address] [place] [address], [place] [address]. All rights reserved. This information is not intended as a substitute for professional medical care. Always follow your healthcare professional's instructions.

## 2021-06-19 NOTE — LETTER
Letter by Ellen Kelsey MD at      Author: Ellen Kelsey MD Service: -- Author Type: --    Filed:  Encounter Date: 8/16/2019 Status: (Other)         Aby Giraldo  [address] [facility] [address]             August 16, 2019         Dear Ms. Enzo,    Below are the results from your recent visit:    Resulted Orders   Gynecologic Cytology (PAP Smear)   Result Value Ref Range    Case Report       Gynecologic Cytology Report                       Case: [id number]                                   Authorizing Provider:  Ellen Kelsey MD    Collected:           08/08/2019 1115              Ordering Location:     [facility]     Received:            08/08/2019 1115                                     Medicine/OB                                                                  First Screen:          Jocelynn Solis, CT                                                                               (ASCP)                                                                       Specimen:    SUREPATH PAP, SCREENING, Endocervical/cervical                                             Interpretation  Negative for squamous intraepithelial lesion or malignancy.      Negative for squamous intraepithelial lesion or malignancy    Result Flag Normal Normal    Specimen Adequacy       Satisfactory for evaluation, endocervical/transformation zone component present    HPV Reflex? Yes regardless of result     HIGH RISK No     LMP/Menopause Date doest remember     Abnormal Bleeding No     Pt Status NA     Birth Control/Hormones None     Previous Normal/Date 9/23/19     Prev Abn Date/Dx NO     Cervical Appearance ok    Comprehensive Metabolic Panel   Result Value Ref Range    Sodium 144 136 - 145 mmol/L    Potassium 4.4 3.5 - 5.0 mmol/L    Chloride 109 (H) 98 - 107 mmol/L    CO2 25 22 - 31 mmol/L    Anion Gap, Calculation 10 5 - 18 mmol/L    Glucose 143 (H) 70 - 125 mg/dL    BUN 22 8 - 22 mg/dL    Creatinine  1.00 0.60 - 1.10 mg/dL    GFR MDRD Af Amer >60 >60 mL/min/1.73m2    GFR MDRD Non Af Amer 58 (L) >60 mL/min/1.73m2    Bilirubin, Total 0.3 0.0 - 1.0 mg/dL    Calcium 10.0 8.5 - 10.5 mg/dL    Protein, Total 7.3 6.0 - 8.0 g/dL    Albumin 4.0 3.5 - 5.0 g/dL    Alkaline Phosphatase 108 45 - 120 U/L    AST 30 0 - 40 U/L    ALT 48 (H) 0 - 45 U/L    Narrative    Fasting Glucose reference range is 70-99 mg/dL per  American Diabetes Association (ADA) guidelines.   Thyroid Cascade   Result Value Ref Range    TSH 0.70 0.30 - 5.00 uIU/mL   Glycosylated Hemoglobin A1c   Result Value Ref Range    Hemoglobin A1c 6.4 (H) 3.5 - 6.0 %   Lipid Cascade   Result Value Ref Range    Cholesterol 179 <=199 mg/dL    Triglycerides 171 (H) <=149 mg/dL    HDL Cholesterol 49 (L) >=50 mg/dL    LDL Calculated 96 <=129 mg/dL    Patient Fasting > 8hrs? No    Microalbumin, Random Urine   Result Value Ref Range    Microalbumin, Random Urine 1.81 0.00 - 1.99 mg/dL    Creatinine, Urine 204.0 mg/dL    Microalbumin/Creatinine Ratio Random Urine 8.9 <=19.9 mg/g    Narrative    Microalbumin, Random Urine  <2.0 mg/dL . . . . . . . . Normal  3.0-30.0 mg/dL . . . . . . Microalbuminuria  >30.0 mg/dL . . . . . .  . Clinical Proteinuria    Microalbumin/Creatinine Ratio, Random Urine  <20 mg/g . . . . .. . . . Normal   mg/g . . . . . . . Microalbuminuria  >300 mg/g . . . . . . . . Clinical Proteinuria       HM2(CBC w/o Differential)   Result Value Ref Range    WBC 7.5 4.0 - 11.0 thou/uL    RBC 4.62 3.80 - 5.40 mill/uL    Hemoglobin 13.8 12.0 - 16.0 g/dL    Hematocrit 41.7 35.0 - 47.0 %    MCV 90 80 - 100 fL    MCH 29.9 27.0 - 34.0 pg    MCHC 33.1 32.0 - 36.0 g/dL    RDW 11.6 11.0 - 14.5 %    Platelets 280 140 - 440 thou/uL    MPV 8.1 7.0 - 10.0 fL   HPV High Risk DNA Cervical   Result Value Ref Range    HPV Source SurePath     HPV16 DNA Negative NEG    HPV18 DNA Negative NEG    Other HR HPV Negative NEG    Final Diagnosis SEE NOTES       Comment:      This  patient's sample is negative for HPV DNA.  This test was developed and its performance characteristics determined by the  [facility], Molecular Diagn[facility]. It  has not been cleared or approved by the FDA. The laboratory is regulated under  CLIA as qualified to perform high-complexity testing. This test is used for  clinical purposes. It should not be regarded as investigational or for  research.  (Note)  METHODOLOGY:  The Roche madina 4800 system uses automated extraction,  simultaneous amplification of HPV (L1 region) and beta-globin,  followed by  real time detection of fluorescent labeled HPV and beta  globin using specific oligonucleotide probes . The test specifically  identifies types HPV 16 DNA and HPV 18 DNA while concurrently  detecting the rest of the high risk types (31, 33, 35, 39, 45, 51,  52, 56, 58, 59, 66 or 68).    COMMENTS:  This test is not intended for use as a screening device  for women under age 30 with normal cervical   cytology.  Results should  be correlated with cytologic and histologic findings. Close clinical  followup is recommended.        Specimen Description Cervical Cells       Comment:        Performed and/or entered by:  [address] [address]        Normal pap, recheck in 3 years.  Blood sugar is elevated in range of diabetes.  A1c is also very close to range of diabetes.  I would recommend restarting the metformin  mg daily with breakfast.  I will send a script.  Schedule appointment in 6 months for medication check and we will recheck labs.  Let me know if you would like a referral to our diabetic educator here at our clinic.  One liver function is elevated but better than past check.  Cholesterol looks good, continue your cholesterol medication.  The rest of the labs are fine.     Please call with questions or contact us using [employer].    Sincerely,        Electronically  signed by Ellen Kelsey MD

## 2021-06-20 NOTE — LETTER
Letter by Ellen Kelsey MD at      Author: Ellen Kelsey MD Service: -- Author Type: --    Filed:  Encounter Date: 8/20/2020 Status: (Other)         Aby Giraldo  [address] [facility] [address]             August 20, 2020         Dear MsRamin Giraldo,    Below are the results from your recent visit:    Resulted Orders   MR Lumbar Spine Without Contrast    Narrative    EXAM: MR LUMBAR SPINE WO CONTRAST  LOCATION: [facility]  DATE/TIME: 8/18/2020 3:38 PM    INDICATION: Low back and sacral and SI pain and leg weakness.  COMPARISON: None.  TECHNIQUE: Without IV contrast    FINDINGS:   Nomenclature is based on 5 lumbar type vertebral bodies. The conus ends at distal L1. Mild dextrocurvature of the lumbar spine. 1 mm spondylolisthesis of L4 on L5. Vertebral body heights are maintained. Nonpathologic marrow signal. The posterior   paraspinal soft tissues are grossly within normal limits. Normal diameter of the distal abdominal aorta. Moderate degenerative changes of the left hip joint. Modic type II changes at L4-L5 and L5-S1.    T12-L1: Normal disc height and signal. No herniation. Mild to moderate right and mild left facet arthropathy. No spinal canal or neural foraminal stenosis.     L1-L2: Normal disc height and signal. No herniation. Mild to moderate bilateral facet arthropathy. No spinal canal or neural foraminal stenosis.    L2-L3: Normal disc height and signal. No herniation. Mild to moderate bilateral facet arthropathy. No spinal canal or neural foraminal stenosis.     L3-L4: Mild intervertebral disc height loss. Loss of the normal T2 signal within the disc. There is a broad-based disc bulge with superimposed small central disc protrusion. Mild to moderate bilateral facet arthropathy. Mild narrowing of the lateral   recesses. No spinal canal narrowing. No right neural foraminal narrowing. No left neural foraminal narrowing.    L4-L5: Normal intervertebral disc height. There is a small  broad-based disc bulge. Severe bilateral facet arthropathy. Mild spinal canal narrowing. Mild narrowing of the lateral recesses. No right neural foraminal narrowing. No left neural foraminal   narrowing.    L5-S1: Mild intervertebral disc height loss. Loss of the normal T2 signal within the disc. Small broad-based disc bulge. Moderate narrowing of the lateral recesses. No spinal canal narrowing. No right neural foraminal narrowing. No left neural foraminal   narrowing.      Impression    1.  Mild spinal canal narrowing and mild narrowing of the lateral recesses at L4-L5.  2.  Moderate narrowing of the lateral recesses at L5-S1. No high-grade neural foraminal narrowing.  3.  Modic type II changes at L4-L5 and L5-S1.  4.  Moderate degenerative changes of the left hip joint.         Lumbar spine MRI does show degenerative changes of the intervertebral discs.  There are some discs  protruding or bulging.  Some narrowing where the nerves come off of the spine.  Also some arthritis in  the left hip.  I am glad you are going to see the spine clinic.     Please call with questions or contact us using [employer].    Sincerely,        Electronically signed by Ellen Kelsey MD

## 2021-06-20 NOTE — LETTER
Letter by Ellen Kelsey MD at      Author: Ellen Kelsey MD Service: -- Author Type: --    Filed:  Encounter Date: 8/21/2020 Status: (Other)         Aby Giraldo  [address] [facility] [address]             August 21, 2020         Dear Ms. Enzo,    Below are the results from your recent visit:    Resulted Orders   US Pelvis With Transvaginal Non OB    Narrative    EXAM: US PELVIS WITH TRANSVAGINAL NON OB  LOCATION: [facility]  DATE/TIME: 8/20/2020 5:47 PM    INDICATION: Pelvic mass and renal pelvic MRI  COMPARISON: CT 08/18/2013, MRI 08/18/2020 with isoechoic and hypoechoic components. Internal flow is noted on color Doppler imaging.  TECHNIQUE: Transabdominal scans were performed. Endovaginal ultrasound was performed to better visualize the adnexa.    FINDINGS:    UTERUS: 6.4 x 3.9 x 4.2 cm. Retroflexed. Along the posterior margin of the mid to upper uterine segment there is a heterogeneous 3.1 x 1.6 x 2.1 cm structure with isoechoic and hypoechoic components. Internal flow is present on color Doppler imaging.    ENDOMETRIUM: 16 mm. Heterogeneous endometrium. Mild vascular flow on color Doppler imaging.    RIGHT OVARY: 2.2 x 1.2 x 1.0 cm. Normal appearance. Color and spectral Doppler interrogation limited due to technical factors including the deep position.    LEFT OVARY: 2.7 x 1.3 x 1.1 cm. Normal appearance. Color and spectral Doppler interrogation limited due to technical factors including the deep position.    No significant free fluid.      Impression    1.  Heterogeneous uterine endometrium measuring 16 mm in thickness. Assuming the patient is postmenopausal, this is abnormal and GYN consultation would be advised.  2.  Heterogeneous 3.1 cm mass along the posterior margin of the retroflexed uterus, most likely a partially degenerated uterine fibroid. However, given its atypical appearance, recommend at least short interval ultrasound follow-up within 3 months.      NOTE:  ABNORMAL REPORT    THE DICTATION ABOVE DESCRIBES AN ABNORMALITY FOR WHICH FOLLOW-UP IS NEEDED.         The pelvic ultrasound does show a thickened lining of the uterus.  To be on the safe side I am recommending a  referral to [facility].  You can call [phone] to set that up.  Also what looks like a 3 cm fibroid which  is a benign growth in the uterus.  They said this is a little bit of an atypical appearance and so this should be  discussed with gynecology as well.     Please call with questions or contact us using [employer].    Sincerely,        Electronically signed by Ellen Kelsey MD

## 2021-06-25 NOTE — PROGRESS NOTES
Progress Notes by Rosendo Pineda at 11/28/2017  4:20 PM     Author: Rosendo Pineda Service: -- Author Type: Nurse Practitioner    Filed: 1/10/2018  9:56 PM Encounter Date: 11/28/2017 Status: Signed    : [facility]    Date of Service: 11/28/2017  Primary Provider: Ellen Kelsey MD    Patient Care Team:  Ellen Kelsey MD as PCP - General     ______________________________________________________________________     Patient's Pharmacy:    [facility]/pharmacy #[id number] - [address] [address]  Phone: [phone] Fax: [phone]     Patient's Insurance:    Payor: PREFERRED ONE / Plan: PREF[employer]ED ONE HMO / Product Type: HMO /     ______________________________________________________________________    Assessment:    1. Rash/skin eruption    2. Flexural eczema    3. Encounter for screening mammogram for breast cancer       ______________________________________________________________________      PHQ-2 Total Score: 0 (11/28/2017  4:22 PM)     Plan:  Patient Instructions   1. Medications prescribed at this visit:  - predniSONE (DELTASONE) 10 mg tablet; Take 4 tabs by mouth daily x 2 days, 3 tabs daily x 2 days, 2 tabs daily x 2 days, 1 tab daily x 2 days, stop.  Dispense: 20 tablet; Refill: 0  - triamcinolone (KENALOG) 0.1 % cream; Apply topically 2 (two) times a day. To affected areas on hands/arms  Dispense: 45 g; Refill: 0    2. Imaging tests ordered today:  - Mammo Screening Bilateral     ______________________________________________________________________     Aby GOMEZ Enzo is 52 y.o. female who comes in today for:    Chief Complaint   Patient presents with   ? Rash     started last monday started on both hands and moving up into her arms. itchy, no burning. at first she got really warm and things popped out on her hands       Patient Active Problem List   Diagnosis   ?  Morbid Obesity   ? Allergies   ? Verruca Warts   ? Abdominal Pain   ? Hypothyroidism   ? Hyperlipidemia   ? Blood In Urine   ? Prediabetes   ? Adult Sleep Apnea   ? Recurrent sinus infections     Current Outpatient Prescriptions   Medication Sig   ? albuterol (PROVENTIL HFA;VENTOLIN HFA) 90 mcg/actuation inhaler Inhale 2 puffs every 4 (four) hours as needed for wheezing or shortness of breath.   ? levothyroxine (SYNTHROID, LEVOTHROID) 150 MCG tablet TAKE 1 TABLET BY MOUTH EVERY DAY AT 6AM AS DIRECTED   ? lovastatin (MEVACOR) 20 MG tablet TAKE 1 TABLET EVERYDAY AT BEDTIME   ? predniSONE (DELTASONE) 10 mg tablet Take 4 tabs by mouth daily x 2 days, 3 tabs daily x 2 days, 2 tabs daily x 2 days, 1 tab daily x 2 days, stop.   ? triamcinolone (KENALOG) 0.1 % cream Apply topically 2 (two) times a day. To affected areas on hands/arms     Social History     Social History   ? Marital status: Single     Spouse name: N/A   ? Number of children: N/A   ? Years of education: N/A     Occupational History   ? Not on file.     Social History Main Topics   ? Smoking status: Never Smoker   ? Smokeless tobacco: Not on file   ? Alcohol use Not on file   ? Drug use: Not on file   ? Sexual activity: Not on file     Other Topics Concern   ? Not on file     Social History Narrative      ______________________________________________________________________     History of present illness: Aby Giraldo is a pleasant 52 y.o. female with a PMH pertinent for ALEJO, hypothyroidism, HLD, recurrent sinusitis, environmental allergies, prediabetes, and morbid obesity who presents in clinic today for skin rash and update health maintenance.  She developed onset of skin rash on both of her anterior sides of her hands and now moving up the arms a bit.  She states that the rash is pruritus, but no burning. This began approximately 1 week ago (Monday).  She has tried benadryl for her symptoms but has not been of benefit.  She has not had any exposures to  pets recently.  She did do some work out in her yard last weekend and wore gloves, but her symptoms had already been there.  She works as an  and states that her job is quite stressful.  She also inquires about having her annual screening mammogram done.    Review of systems:   On ROS, the patient denies fevers, chills, sweats, cough. shortness of breath or GI symptoms.  Positive for symptoms as noted in the HPI.  ______________________________________________________________________    Wt Readings from Last 3 Encounters:   11/28/17 (!) 305 lb 1.6 oz (138.4 kg)   07/09/17 (!) 300 lb 11.2 oz (136.4 kg)   02/24/17 (!) 306 lb 6.4 oz (139 kg)     BP Readings from Last 3 Encounters:   11/28/17 120/82   07/09/17 116/78   02/24/17 120/72     /82 (Patient Site: Right Arm, Patient Position: Sitting, Cuff Size: Adult Large)  Pulse 100  Temp 97.5  F (36.4  C) (Oral)   Wt (!) 305 lb 1.6 oz (138.4 kg)  BMI 48.51 kg/m2     Physical Exam:  General Appearance: Alert, cooperative, no distress, appears stated age  Head: Normocephalic, without obvious abnormality, atraumatic  Eyes: PERRL, conjunctiva/corneas clear  Ears: Normal TM's and external ear canals, both ears  Nose: Nares normal, septum midline,mucosa normal, no drainage  Throat: Lips, mucosa, and tongue normal; teeth and gums normal; no erythema, exudate, or thrush  Neck: Supple, symmetrical, trachea midline, no adenopathy  Lungs: Clear to auscultation bilaterally, respirations unlabored  Heart: Regular rate and rhythm, S1 and S2 normal, no murmur, rub, or gallop  Abdomen: Soft, non-tender, bowel sounds active all four quadrants  Musculoskeletal: Moves all extremities. No joint swelling or deformity.   Extremities: Extremities normal, atraumatic, no cyanosis or edema  Skin: Skin color, texture, turgor normal, noted few areas on hands/wrists/elbows pinkish/red raised maculopapular skin rash appears to be a possible flexural eczema.  Lymph nodes: Cervical &  supraclavicular nodes normal  Neurologic: She is alert & oriented x 3. She has a normal gait.   Psychiatric: She has a normal mood and affect.       Rosendo Pineda CNP  Internal Medicine  [facility]     Return if symptoms worsen or fail to improve, for or, Follow-up with your Primary Care Provider.

## 2021-06-25 NOTE — PROGRESS NOTES
Progress Notes by Dyana Parks PA-C at 7/9/2017  2:00 PM     Author: Dyana Parks PA-C Service: -- Author Type: Physician Assistant    Filed: 7/9/2017  3:55 PM Encounter Date: 7/9/2017 Status: Signed    : Dyana Parks PA-C (Physician Assistant)       Subjective:      Patient ID: Aby Giraldo is a 51 y.o. female.    Chief Complaint:    HPI Aby Giraldo is a 51 y.o. female who presents today complaining of right ear pain x 1 week. Patient reports that the pain radiates towards the right eye just over the right sinus. Patient has a history of ear infections, and this feels similar.  He reports that she is also having sinus congestion on the right side, the pain also radiates down the throat on the right side.  She denies having any fevers.  She has been taking Advil regularly for the pain relief which has been somewhat effective.  She denies any known sick contacts.  She does have a history of allergies to dogs, but she has not had any recent exposures.  Her eye has had a thin tearful discharge and there is a mild redness of the right eye.  She has not had any change in her vision, eye pain, or itching.  She denies any cough, abdominal pain, rash, nausea, vomiting.  She did have one bout of diarrhea this morning.        Past Surgical History:   Procedure Laterality Date   ? CT REMOVAL GALLBLADDER      Description: Cholecystectomy;  Proc Date: 03/01/2010;       No family history on file.    Social History   Substance Use Topics   ? Smoking status: Never Smoker   ? Smokeless tobacco: None   ? Alcohol use None       Review of Systems   Constitutional: Negative for chills and fever.   HENT: Positive for congestion, ear pain, rhinorrhea, sinus pressure and sore throat. Negative for ear discharge.    Eyes: Positive for discharge and redness. Negative for pain, itching and visual disturbance.   Respiratory: Negative for cough.    Gastrointestinal: Positive for diarrhea. Negative for abdominal pain, nausea and  vomiting.   Skin: Negative for rash.   Allergic/Immunologic: Positive for environmental allergies.       Objective:     /78 (Patient Site: Right Arm, Patient Position: Sitting, Cuff Size: Adult Large)  Pulse 88  Temp 98.2  F (36.8  C) (Oral)   Wt (!) 300 lb 11.2 oz (136.4 kg)  SpO2 97%  BMI 47.81 kg/m2    Physical Exam   Constitutional: She appears well-developed and well-nourished. No distress.   HENT:   Head: Normocephalic and atraumatic.   Right Ear: Tympanic membrane and external ear normal.   Left Ear: Tympanic membrane and external ear normal.   Nose: Right sinus exhibits maxillary sinus tenderness. Right sinus exhibits no frontal sinus tenderness. Left sinus exhibits no maxillary sinus tenderness and no frontal sinus tenderness.   Mouth/Throat: Uvula is midline and oropharynx is clear and moist. No oropharyngeal exudate, posterior oropharyngeal edema or posterior oropharyngeal erythema.   Eyes: Conjunctivae are normal.   Neck: Normal range of motion. Neck supple.   Cardiovascular: Normal rate, regular rhythm and normal heart sounds.  Exam reveals no gallop and no friction rub.    No murmur heard.  Pulmonary/Chest: Effort normal and breath sounds normal. No respiratory distress. She has no wheezes. She has no rales.   Lymphadenopathy:     She has no cervical adenopathy.   Skin: No rash noted. She is not diaphoretic.        Redness over the right maxillary sinus.   Psychiatric: She has a normal mood and affect. Her behavior is normal. Judgment and thought content normal.   Nursing note and vitals reviewed.    Labs:  Recent Results (from the past 24 hour(s))   Rapid Strep A Screen-Throat   Result Value Ref Range    Rapid Strep A Antigen No Group A Strep detected, presumptive negative No Group A Strep detected, presumptive negative     Confirmatory strep test pending.    Clinical Decision Making:  Symptoms of ear pain and sinus pressure with sinus tenderness and redness are consistent with sinusitis.   Possibly still viral however the patient is traveling and will be unable to get care within the next few days.  Patient was treated with Augmentin for the sinusitis, I also recommended over-the-counter decongestant and sinus washes.  The patient's rapid strep test was negative today.  Confirmatory strep test is pending.  Patient is already being treated with Augmentin.  Possible differential diagnosis includes shingles considering the patient's pain distribution spreads from the ear to the cheek.  I recommend following up immediately if a rash develops.  Procedures      Assessment / Plan:     1. Sore throat  Rapid Strep A Screen-Throat    Group A Strep, RNA Direct Detection, Throat   2. Sinusitis  amoxicillin-clavulanate (AUGMENTIN) 875-125 mg per tablet         Patient Instructions   1.  Take Augmentin twice daily for a total of 10 days.  2.  You can try nasal saline washes available over-the-counter for sinus pain and pressure relief.  3.  Continue using Advil as needed for pain relief.  4.  Follow-up if you develop any rash over the area that you are having pain, changes in vision, or no improvement in your symptoms after 5 days of treatment.

## 2021-06-25 NOTE — PROGRESS NOTES
Progress Notes by Robin Kong DO at 2/24/2017  5:30 PM     Author: Robin Kong DO Service: -- Author Type: Physician    Filed: 2/26/2017  8:03 AM Encounter Date: 2/24/2017 Status: Signed    : Robin Kong DO (Physician)       Chief Complaint   Patient presents with   ? sinus pressure     x1 day sinus pressure , right eye keeps watering sore from right ear down neck,  pain in jaw , teeth hurt , head ache      History of Present Illness: Nursing notes reviewed. Patient has pain in right upper teeth. She saw a dentist yesterday with no dental problem noted. She has felt warm and cold.     Review of systems: See history of present illness, otherwise negative.     Current Outpatient Prescriptions   Medication Sig Dispense Refill   ? ciprofloxacin HCl (CIPRO) 500 MG tablet TAKE 1 TABLET (500 MG TOTAL) BY MOUTH 2 (TWO) TIMES A DAY FOR 10 DAYS. 20 tablet 0   ? levothyroxine (SYNTHROID, LEVOTHROID) 150 MCG tablet TAKE 1 TABLET BY MOUTH EVERY DAY AT 6 AM AS DIRECTED 90 tablet 0   ? lovastatin (MEVACOR) 20 MG tablet TAKE 1 TABLET EVERYDAY AT BEDTIME 90 tablet 3   ? albuterol (PROVENTIL HFA;VENTOLIN HFA) 90 mcg/actuation inhaler Inhale 2 puffs every 4 (four) hours as needed for wheezing or shortness of breath. 1 Inhaler 0   ? amoxicillin-clavulanate (AUGMENTIN) 875-125 mg per tablet Take 1 tablet by mouth 2 (two) times a day for 10 days. 20 tablet 0     No current facility-administered medications for this visit.        No past medical history on file.   Past Surgical History:   Procedure Laterality Date   ? [place] REMOVAL GALLBLADDER      Description: Cholecystectomy;  Proc Date: 03/01/2010;      Social History     Social History   ? Marital status: Single     Spouse name: N/A   ? Number of children: N/A   ? Years of education: N/A     Social History Main Topics   ? Smoking status: Never Smoker   ? Smokeless tobacco: None   ? Alcohol use None   ? Drug use: None   ? Sexual activity: Not Asked     Other Topics  Concern   ? None     Social History Narrative       History   Smoking Status   ? Never Smoker   Smokeless Tobacco   ? Not on file      Exam:   Blood pressure 120/72, pulse 90, temperature 98.3  F (36.8  C), temperature source Oral, resp. rate 20, weight (!) 306 lb 6.4 oz (139 kg), SpO2 95 %, not currently breastfeeding.    EXAM:   General: Vital signs reviewed. Patient is in no acute appearing distress except for rare cough. Breathing is non labored appearing. Patient is alert and oriented x 3.   ENT: Ear exam shows right TM dullness and injection with left TM being clear without injection, right nasal turbinates are injected and edematous with increased purulent rhinorrhea, no pharyngeal injection with increased post nasal drainage noted.  Neck:   Heart: Normal rate and rhythm without murmur  Lungs: Clear to auscultation with good air flow bilaterally, with some coughing with deeper inspiration.  Skin: warm and dry    Assessment/Plan   1. Sinusitis  amoxicillin-clavulanate (AUGMENTIN) 875-125 mg per tablet   2. Right otitis media  amoxicillin-clavulanate (AUGMENTIN) 875-125 mg per tablet   3. Bronchitis  albuterol (PROVENTIL HFA;VENTOLIN HFA) 90 mcg/actuation inhaler       Patient Instructions     Also see info below. Be seen again in 4-5 days if symptoms are not better, sooner if feeling any worse.  Acute Bacterial Rhinosinusitis (ABRS)  Acute bacterial rhinosinusitis (ABRS) is an infection of your nasal cavity and sinuses. Its caused by bacteria. Acute means that youve had symptoms for less than 12 weeks.  Understanding your sinuses  The nasal cavity is the large air-filled space behind your nose. The sinuses are a group of spaces formed by the bones of your face. They connect with your nasal cavity. ABRS causes the tissue lining these spaces to become inflamed. Mucus may not drain normally. This leads to facial pain and other symptoms.  What causes ABRS?  ABRS most often follows an upper respiratory infection  caused by a virus. Bacteria then infect the lining of your nasal cavity and sinuses. But you can also get ABRS if you have:    Nasal allergies    Long-term nasal swelling and congestion not caused by allergies    Blockage in the nose  Symptoms of ABRS  The symptoms of ABRS may be different for each person, and can include:    Nasal congestion    Runny nose    Fluid draining from the nose down the throat (postnasal drip)    Headache    Cough    Pain in the sinuses    Thick, colored fluid from the nose (mucus)    Fever  Diagnosing ABRS  ABRS may be diagnosed if youve had an upper respiratory infection like a cold and cough for longer than 10 to 14 days. Your health care provider will ask about your symptoms and your medical history. The provider will check your vital signs, including your temperature. Youll have a physical exam. The health care provider will check your ears, nose, and throat. You likely wont need any tests. If ABRS comes back, you may have a culture or other tests.  Treatment for ABRS  Treatment may include:    Antibiotic medicine. This is for symptoms that last for at least 10 to 14 days.    Nasal corticosteroid medicine. Drops or spray used in the nose can lessen swelling and congestion.    Over-the-counter pain medicine. This is to lessen sinus pain and pressure.    Nasal decongestant medicine. Spray or drops may help to lessen congestion. Do not use them for more than a few days.    Salt wash (saline irrigation). This can help to loosen mucus.  Possible complications of ABRS  ABRS may come back or become long-term (chronic).  In rare cases, ABRS may cause complications such as:     Inflamed tissue around the brain and spinal cord (meningitis)    Inflamed tissue around the eyes (orbital cellulitis)    Inflamed bones around the sinuses (osteitis)  These problems may need to be treated in a hospital with intravenous (IV) antibiotic medicine or surgery.  When to call the health care provider  Call  your health care provider if you have any of the following:    Symptoms that dont get better, or get worse    Symptoms that dont get better after 3 to 5 days on antibiotics    Trouble seeing    Swelling around your eyes    Confusion or trouble staying awake        8668-4726 [employer]. [address] [place] [address]. All rights reserved. This information is not intended as a substitute for professional medical care. Always follow your healthcare professional's instructions.        Otitis Media (Middle-Ear Infection) in Adults  Otitis media is another name for a middle-ear infection. It means an infection behind your eardrum. This kind of ear infection can happen after any condition that keeps fluid from draining from the middle ear. These conditions include allergies, a cold, a sore throat, or a respiratory infection.  Middle-ear infections are common in children, but they can also happen in adults. An ear infection in an adult may mean a more serious problem than in a child. So you may need additional tests. If you have an ear infection, you should see your health care provider for treatment.  What are the types of middle-ear infections?  Infections can affect the middle ear in several ways. They are:    Acute otitis media. This middle-ear infection occurs suddenly. It causes swelling and redness. Fluid and mucus become trapped inside the ear. You can have a fever and ear pain.    Otitis media with effusion. Fluid (effusion) and mucus build up in the middle ear after the infection goes away. You may feel like your middle ear is full. This can continue for months and may affect your hearing.    Chronic otitis media with effusion. Fluid (effusion) remains in the middle ear for a long time. Or it builds up again and again, even though there is no infection. This type of middle-ear infection may be hard to treat. It may also affect your hearing.  Who is more likely to get a middle-ear  infection?  You are more likely to get an ear infection if you:    Smoke or are around someone who smokes    Have seasonal or year-round allergy symptoms    Have a cold or other upper respiratory infection  What causes a middle-ear infection?  The middle ear connects to the throat by a canal called the eustachian tube. This tube helps even out the pressure between the outer ear and the inner ear. A cold or allergy can irritate the tube or cause the area around it to swell. This can keep fluid from draining from the middle ear. The fluid builds up behind the eardrum. Bacteria and viruses can grow in this fluid. The bacteria and viruses cause the middle-ear infection.  What are the symptoms of a middle-ear infection?  Common symptoms of a middle-ear infection in adults are:    Pain in 1 or both ears    Drainage from the ear    Muffled hearing    Sore throat   You may also have a fever. Rarely, your balance can be affected.  These symptoms may be the same as for other conditions. Its important to talk with your health care provider if you think you have a middle-ear infection. If you have a high fever, severe pain behind your ear, or paralysis in your face, see your provider as soon as you can.  How is a middle-ear infection diagnosed?  Your health care provider will take a medical history and do a physical exam. He or she will look at the outer ear and eardrum with an otoscope. The otoscope is a lighted tool that lets your provider see inside the ear. A pneumatic otoscope blows a puff of air into the ear to check how well your eardrum moves. If you eardrum doesnt move well, it may mean you have fluid behind it.  Your provider may also do a test called tympanometry. This test tells how well the middle ear is working. It can find any changes in pressure in the middle ear. Your provider may test your hearing with a tuning fork.  How is a middle-ear infection treated?  A middle-ear infection may be treated  with:    Antibiotics, taken by mouth or as ear drops    Medication for pain    Decongestants, antihistamines, or nasal steroids  Your health care provider may also have you try autoinsufflation. This helps adjust the air pressure in your ear. For this, you pinch your nose and gently exhale. This forces air back through the eustachian tube.  The exact treatment for your ear infection will depend on the type of infection you have. In general, if your symptoms dont get better in 48 to 72 hours, contact your health care provider.  Middle-ear infections can cause long-term problems if not treated. They can lead to:    Infection in other parts of the head    Permanent hearing loss    Paralysis of a nerve in your face  If you have a middle-ear infection that doesnt get better, you may need to see an ear, nose, and throat specialist (otolaryngologist). You may need a CT scan or MRI to check for head and neck cancer.  Ear tubes  Sometimes fluid stays in the middle ear even after you take antibiotics and the infection goes away. In this case, your health care provider may suggest that a small tube be placed in your ear. The tube is put at the opening of the eardrum. The tube keeps fluid from building up and relieves pressure in the middle ear. It can also help you hear better. This surgery is called myringotomy. It is not often done in adults.  The tubes usually fall out on their own after 6 months to a year.    3447-8473 [employer]. [address] [place] [address]. All rights reserved. This information is not intended as a substitute for professional medical care. Always follow your healthcare professional's instructions.          Acute Bronchitis  Your health care provider has told you that you have acute bronchitis. Bronchitis is infection or inflammation of the bronchial tubes (airways in the lungs). Normally, air moves easily in and out of the airways. Bronchitis narrows the airways, making it  harder for air to flow in and out of the lungs. This causes symptoms such as shortness of breath, coughing, and wheezing. Bronchitis can be acute or chronic. Acute means the condition comes on quickly and goes away in a short time. Chronic means a condition lasts a long time and often comes back. Read on to learn more about acute bronchitis.    What Causes Acute Bronchitis?  Acute bronchitis almost always starts as a viral respiratory infection, such as a cold or the flu. Certain factors make it more likely for a cold or flu to turn into bronchitis. These include being very young or very old or having a heart or lung problem. Cigarette smoking also makes bronchitis more likely.  When bronchitis develops, the airways become swollen. The airways may also become infected with bacteria. This is known as a secondary infection.  Diagnosing Acute Bronchitis  Your health care provider will examine you and ask about your symptoms and health history. You may also have a sputum culture to test the fluid in your lungs. Chest X-rays may be done to look for infection in the lungs.  Treating Acute Bronchitis  Bronchitis usually clears up as the cold or flu goes away. You can help feel better faster by doing the following:    Take medication as directed. You may be told to take ibuprofen or other over-the-counter medications. These help relieve inflammation in your bronchial tubes. Your doctor may prescribe an inhaler to help open up the bronchial tubes. Most of the time, acute bronchitis is caused by a viral infection. Antibiotics are usually not prescribed for viral infections.    Drink plenty of fluids, such as water, juice, or warm soup. Fluids loosen mucus so that you can cough it up. This helps you breathe more easily. Fluids also prevent dehydration.    Make sure you get plenty of rest.    Do not smoke. Do not allow anyone else to smoke in your home.  Recovery and Follow-Up  Follow up with your doctor as you are told. You  will likely feel better in a week or two. But a dry cough can linger beyond that time. Let your doctor know if you still have symptoms (other than a dry cough) after 2 weeks. If youre prone to getting bronchial infections, let your doctor know. And take steps to protect yourself from future infections. These steps include stopping smoking and avoiding tobacco smoke, washing your hands often, and getting a yearly flu shot.  When to Call the Doctor  Call the doctor if you have any of the following:    Fever of 100.4 F (38.0 C) higher    Symptoms that get worse, or new symptoms    Trouble breathing    Symptoms that dont start to improve within a week, or within 3 days of taking antibiotics     [id number] [employer]. [address] [place] [address], [place] [address]. All rights reserved. This information is not intended as a substitute for professional medical care. Always follow your healthcare professional's instructions.           Robin Kong, DO

## 2021-06-26 NOTE — PROGRESS NOTES
NEUROSURGERY CONSULTATION NOTE    6/9/2021     Aby Giraldo is a 55 y.o. female who is sent to us in consultation by Samantha Abdi for evaluation of lumbar stenosis        CONSULTATION ASSESSMENT AND PLAN:    56 yo female who presents with chronic low back pain and b/l LE weakness.  MRI of the lumbar spine shows moderate spinal canal narrowing at lumbar 4-5 with mild foraminal narrowing as well as moderate narrowing of the lateral recesses at L5-S1.  Lumbar x-ray shows 13 mm anterolisthesis of lumbar 4 5 in neutral position which decreases to 9 mm on extension and increases to 14 mm on flexion.  Patient's current BMI is 48.83.  We discussed that although her low back pain and leg symptoms are mostly likely attributable to her lumbar findings treatment would involve a lumbar fusion given her instability which ideally she would have a BMI under 40 to undergo surgery.  She was set up to see the bariatric clinic last year prior to the onset of Covid.  She is interested in reconsulting with them to see if there is any surgical options for weight loss.  An order has been placed. She is to return to clinic if any new or worsening neurological symptoms.     I spent more than 30 minutes in this apt, examining the pt, reviewing the scans, reviewing notes from chart, discussing treatment options with risks and benefits and coordinating care.     Ellen Casiano     HPI: 56 yo female who presents with low back pain and b/l LE weakness.  Patient symptoms began in January 2020.  She notes that the summer prior to the onset of her symptoms she was doing a lot of heavy lifting.  She now primarily has low back pain.  She also had radicular leg pain which resolved after her most recent transforaminal epidural steroid injection.  She has quadricep weakness bilaterally as well which is relatively unchanged for the last year.  She denies any numbness or tingling in her legs, bowel or bladder dysfunction other than urinary  frequency.    Her low back pain is worse with walking and lifting her legs.  Symptoms will improve her symptoms.  Physical therapy and stretch that has helped her symptoms overall.  She had no improvement with gabapentin or Flexeril.      Prior Spine Surgery: no    Past Medical History:   Diagnosis Date     Abdominal pain      Blood in urine      Diabetes mellitus type 2 in obese (H)      HLD (hyperlipidemia)      Hypothyroidism      Morbid obesity (H)      Prediabetes      Recurrent sinus infections      Seasonal allergies      Shortness of breath      Sleep apnea in adult      Verruca warts (infectious)        Past Surgical History:   Procedure Laterality Date     RI HYSTEROSCOPY,W/ENDO BX N/A 10/28/2020    Procedure: HYSTEROSCOPY, DILATION AND CURETTAGE, POLYPECTOMY;  Surgeon: Kanika Gonzalez DO;  Location: [facility];  Service: Gynecology     [place] REMOVAL GALLBLADDER      Description: Cholecystectomy;  Proc Date: 03/01/2010;     REVIEW OF SYSTEMS:  ROS reviewed with pt as documented on pt health form of 6/9/2021.     No family hx of anesthetic reactions.  Family hx of hypercoagulability- yes, sister DVTs.     MEDICATIONS:  Current Outpatient Medications   Medication Sig Dispense Refill     aspirin 81 MG EC tablet Take 1 tablet (81 mg total) by mouth daily. 150 tablet 2     cyclobenzaprine (FLEXERIL) 10 MG tablet Take 1 tablet (10 mg total) by mouth 3 (three) times a day as needed for muscle spasms. 30 tablet 3     fexofenadine (ALLEGRA) 60 MG tablet Take 60 mg by mouth daily.       ibuprofen (ADVIL,MOTRIN) 600 MG tablet Take 1 tablet (600 mg total) by mouth every 6 (six) hours as needed for pain. 30 tablet 0     levothyroxine (SYNTHROID, LEVOTHROID) 150 MCG tablet Take 1 tablet (150 mcg total) by mouth Daily at 6:00 am. 90 tablet 3     lovastatin (MEVACOR) 40 MG tablet Take 1 tablet (40 mg total) by mouth daily. 90 tablet 3     triamcinolone (KENALOG) 0.1 % cream Apply topically 2 (two) times a day.  "To affected areas on hands/arms 45 g 3     No current facility-administered medications for this visit.          ALLERGIES/SENSITIVITIES:     Allergies   Allergen Reactions     Simvastatin Diarrhea       PERTINENT SOCIAL HISTORY:   Social History     Socioeconomic History     Marital status: Single     Spouse name: None     Number of children: None     Years of education: None     Highest education level: None   Occupational History     Occupation:    Social Needs     Financial resource strain: None     Food insecurity     Worry: None     Inability: None     Transportation needs     Medical: None     Non-medical: None   Tobacco Use     Smoking status: Never Smoker     Smokeless tobacco: Never Used   Substance and Sexual Activity     Alcohol use: Yes     Frequency: 2-3 times a week     Drug use: No     Sexual activity: None   Lifestyle     Physical activity     Days per week: None     Minutes per session: None     Stress: None   Relationships     Social connections     Talks on phone: None     Gets together: None     Attends Congregational service: None     Active member of club or organization: None     Attends meetings of clubs or organizations: None     Relationship status: None     Intimate partner violence     Fear of current or ex partner: None     Emotionally abused: None     Physically abused: None     Forced sexual activity: None   Other Topics Concern     None   Social History Narrative    She exercises daily. She eats three meals per day. She wears seatbelts while in the car.          FAMILY HISTORY:  Family History   Problem Relation Age of Onset     Thyroid disease Mother      Asthma Mother      Hypertension Father      Heart disease Father      Alzheimer's disease Maternal Grandfather      Stroke Paternal Grandfather      Osteoporosis Neg Hx      Cancer Neg Hx      Other Neg Hx         Chemical dependency     Depression Neg Hx         PHYSICAL EXAM:   Constitutional: Pulse 100   Ht 5' 6.25\" (1.683 m)  "  Wt (!) 304 lb 12.8 oz (138.3 kg)   SpO2 98%   BMI 48.83 kg/m       Mental Status: A & O in no acute distress.  Affect is appropriate.  Speech is fluent.  Recent and remote memory are intact.  Attention span and concentration are normal.    Motor: Normal bulk and tone all muscle groups of upper and lower extremities. HF R 4/5 L H 3/5     Sensory: Sensation intact bilaterally to light touch.      Coordination:  Wide based slow gait      Reflexes; supinator, biceps, triceps, knee/ ankle jerk intact. no hoffmans/ no    babinski/ clonus.    IMAGING: I personally reviewed all radiographic images      Cc:   Ellen Kelsey MD  [address]  [facility] [address]

## 2021-06-26 NOTE — PROGRESS NOTES
Neurosurgery consultation was requested by: LIZZETTE Arnett  Pain: low back pain   Radicular Pain is present: bilateral hip pain   Lhermitte sign: no   Motor complaints: weakness in both legs   Sensory complaints: denies numbness   Gait and balance issues: yes   Bowel or bladder issues: yes   Duration of SX is: over a year   The symptoms are worse with: walking   The symptoms are better with: rest   Injury: no   Severity is: moderate   Patient has tried the following conservative measures: she had a lumbar injection and had about 70% relief for 4 months and had hip injections with 90% relief. She also did PT with minor relief.   EMMAhowdiya,ISAAC  Modified Oswestry Low back Pain Disability Questionnaire    1. PAIN INTENSITY  1- The pain is bad, but I manage without taking pain medication.  2. PERSONAL CARE  1- I can take care of myself normally, but it increases my pain.   3. LIFTING  4- I can only lift very light weights  4. WALKING  3- Pain prevents me from walking more than   mile.  5. SITTING  0- I can sit in any chair as long as I like.  6. STANDING  4- Pain prevents me from standing for more than 10 minutes.   7. SLEEPING  3- Even when I take medication, I sleep less than 4 hours.  8. SOCIAL LIFE  3- Pain prevents me from going out very often.  9. TRAVELING  2- My pain restricts my travel over 2 hours.  10. EMPLOYMENT/HOMEMAKING  2- I can perform most of my homemaking/job activities, but pain prevents me from performing more physically stressful activities (e.g. lifting, vacuuming).    SCORE:__23__ x2=___46%___

## 2021-06-26 NOTE — PATIENT INSTRUCTIONS - HE
Orders Placed This Encounter   Procedures     Ambulatory referral to Bariatric Care: Surgical and Non-Surgical     Referral Priority:   Routine     Referral Type:   Bariatric     Referral Reason:   Evaluation and Treatment     Number of Visits Requested:   1

## 2021-06-29 ENCOUNTER — COMMUNICATION - HEALTHEAST (OUTPATIENT)
Dept: SURGERY | Facility: CLINIC | Age: 56
End: 2021-06-29
Payer: COMMERCIAL

## 2021-07-03 NOTE — ANESTHESIA PREPROCEDURE EVALUATION
Anesthesia Preprocedure Evaluation by Jeremi Cash MD at 10/28/2020  6:39 AM     Author: Jeremi Cash MD Service: -- Author Type: Physician    Filed: 10/28/2020  6:41 AM Date of Service: 10/28/2020  6:39 AM Status: Signed    : Jeremi Cash MD (Physician)       Anesthesia Evaluation      Patient summary reviewed   No history of anesthetic complications     Airway   Mallampati: III  Neck ROM: full   Pulmonary - normal exam   (+) shortness of breath, sleep apnea on no CPAP, ,   (-) not a smoker                         Cardiovascular - normal exam  Exercise tolerance: > or = 4 METS  (+) , hypercholesterolemia,     ECG reviewed        Neuro/Psych    (+) chronic pain    Endo/Other    (+) diabetes mellitus (newly diagnosed - A1C: 6.5 - no meds), hypothyroidism, arthritis, obesity (bmi 47),      GI/Hepatic/Renal - negative ROS           Dental - normal exam                            Anesthesia Plan  Planned anesthetic: MAC  Versed/fentanyl/propofol  Ketamine PRN  Decadron/zofran    ASA 3   Induction: intravenous   Anesthetic plan and risks discussed with: patient  Anesthesia plan special considerations: antiemetics,   Post-op plan: routine recovery      10/24/2020 covid pcr negative    Results for orders placed or performed in visit on 10/21/20   Glycosylated Hemoglobin A1c   Result Value Ref Range    Hemoglobin A1c 6.5 (H) <=5.6 %   Comprehensive Metabolic Panel   Result Value Ref Range    Sodium 142 136 - 145 mmol/L    Potassium 4.6 3.5 - 5.0 mmol/L    Chloride 105 98 - 107 mmol/L    CO2 24 22 - 31 mmol/L    Anion Gap, Calculation 13 5 - 18 mmol/L    Glucose 137 (H) 70 - 125 mg/dL    BUN 16 8 - 22 mg/dL    Creatinine 1.05 0.60 - 1.10 mg/dL    GFR MDRD Af Amer >60 >60 mL/min/1.73m2    GFR MDRD Non Af Amer 55 (L) >60 mL/min/1.73m2    Bilirubin, Total 0.6 0.0 - 1.0 mg/dL    Calcium 9.8 8.5 - 10.5 mg/dL    Protein, Total 7.8 6.0 - 8.0 g/dL    Albumin 4.0 3.5 - 5.0 g/dL    Alkaline Phosphatase 108 45 - 120 U/L     AST 32 0 - 40 U/L    ALT 45 0 - 45 U/L   HM2(CBC w/o Differential)   Result Value Ref Range    WBC 7.8 4.0 - 11.0 thou/uL    RBC 5.25 3.80 - 5.40 mill/uL    Hemoglobin 15.3 12.0 - 16.0 g/dL    Hematocrit 46.4 35.0 - 47.0 %    MCV 88 80 - 100 fL    MCH 29.2 27.0 - 34.0 pg    MCHC 33.0 32.0 - 36.0 g/dL    RDW 12.8 11.0 - 14.5 %    Platelets 304 140 - 440 thou/uL    MPV 8.2 7.0 - 10.0 fL   Lipid Cascade   Result Value Ref Range    Cholesterol 204 (H) <=199 mg/dL    Triglycerides 155 (H) <=149 mg/dL    HDL Cholesterol 49 (L) >=50 mg/dL    LDL Calculated 124 <=129 mg/dL    Patient Fasting > 8hrs? Yes    Thyroid Cascade   Result Value Ref Range    TSH 0.97 0.30 - 5.00 uIU/mL   Vitamin D, Total (25-Hydroxy)   Result Value Ref Range    Vitamin D, Total (25-Hydroxy) 11.3 (L) 30.0 - 80.0 ng/mL   Iron and Transferrin Iron Binding Capacity   Result Value Ref Range    Iron 97 42 - 175 ug/dL    Transferrin 309 212 - 360 mg/dL    Transferrin Saturation, Calculated 25 20 - 50 %    Transferrin IBC, Calculated 386 313 - 563 ug/dL   Vitamin B12   Result Value Ref Range    Vitamin B-12 476 213 - 816 pg/mL   Electrocardiogram Perform and Read   Result Value Ref Range    SYSTOLIC BLOOD PRESSURE      DIASTOLIC BLOOD PRESSURE      VENTRICULAR RATE 102 BPM    ATRIAL RATE 102 BPM    P-R INTERVAL 162 ms    QRS DURATION 78 ms    Q-T INTERVAL 346 ms    QTC CALCULATION (BEZET) 450 ms    P Axis 19 degrees    R AXIS 23 degrees    T AXIS 18 degrees    MUSE DIAGNOSIS       Sinus tachycardia  Otherwise normal ECG  When compared with ECG of 09-MAR-2010 00:05,  Nonspecific T wave abnormality no longer evident in Lateral leads  Confirmed by STONE RIVERA MD LOC:[facility] ([id number]) on 10/21/2020 2:38:59 PM

## 2021-07-08 ENCOUNTER — COMMUNICATION - HEALTHEAST (OUTPATIENT)
Dept: NEUROSURGERY | Facility: CLINIC | Age: 56
End: 2021-07-08

## 2021-08-02 ENCOUNTER — VIRTUAL VISIT (OUTPATIENT)
Dept: SURGERY | Facility: CLINIC | Age: 56
End: 2021-08-02
Payer: COMMERCIAL

## 2021-08-02 DIAGNOSIS — Z71.3 NUTRITIONAL COUNSELING: ICD-10-CM

## 2021-08-02 DIAGNOSIS — E66.813 CLASS 3 SEVERE OBESITY DUE TO EXCESS CALORIES WITH SERIOUS COMORBIDITY AND BODY MASS INDEX (BMI) OF 45.0 TO 49.9 IN ADULT (H): ICD-10-CM

## 2021-08-02 DIAGNOSIS — E66.01 CLASS 3 SEVERE OBESITY DUE TO EXCESS CALORIES WITH SERIOUS COMORBIDITY AND BODY MASS INDEX (BMI) OF 45.0 TO 49.9 IN ADULT (H): ICD-10-CM

## 2021-08-02 DIAGNOSIS — E66.9 DIABETES MELLITUS TYPE 2 IN OBESE: ICD-10-CM

## 2021-08-02 DIAGNOSIS — E78.5 HYPERLIPIDEMIA, UNSPECIFIED HYPERLIPIDEMIA TYPE: Primary | ICD-10-CM

## 2021-08-02 DIAGNOSIS — E11.69 DIABETES MELLITUS TYPE 2 IN OBESE: ICD-10-CM

## 2021-08-02 PROCEDURE — 97802 MEDICAL NUTRITION INDIV IN: CPT | Mod: TEL | Performed by: DIETITIAN, REGISTERED

## 2021-08-02 NOTE — PROGRESS NOTES
Aby Giraldo is a 56 year old who is being evaluated via a billable telephone visit.      What phone number would you like to be contacted at? [phone]   How would you like to obtain your AVS? Radames            Initial Structured Weight Loss Supervised Diet Evaluation     Assessment:  Aby is being seen today for initial RD nutritional evaluation. Patient has been unsuccessful with non-surgical weight loss methods and is interested in bariatric surgery. Today we reviewed current eating habits and level of physical activity, and instructed on the changes that are required for successful bariatric outcomes.    Surgery of interest per pt: RNY.    Workflow review:  Support Group: Not completed.  Psychology:Not completed.  Lab work:Not completed.  SWL:Yes 2nd Visit     Weight goal: 300 lbs or below.    Anthropometrics:  Initial Weight: 304 lbs   Current Weight: 304 lbs   BMI: There is no height or weight on file to calculate BMI.   Ideal body weight: 59.3 kg (130 lb 11.7 oz)  Adjusted ideal body weight: 90.7 kg (200 lb 0.6 oz)    Estimated RMR ([place]-St Jeor equation):  1995 kcals x 1.3 (light active) = 2594 kcals (for weight maintenance)    Medical History:  Patient Active Problem List   Diagnosis     Morbid Obesity     Allergies     Verruca Warts     Abdominal Pain     Hypothyroidism     Hyperlipidemia     Blood In Urine     Adult Sleep Apnea     Recurrent sinus infections     Diabetes mellitus type 2 in obese (H)     Endometrial hyperplasia with atypia     Osteoarthritis of both hips     DDD (degenerative disc disease), lumbar      Diabetes: yes-Ozempic (on hold), Metformin 1 pill/day (due to upset stomach)   HbA1c:  No results found for: HGBA1C      Nutrition History  Food allergies/intolerances/cultural or religous food customs: Yes Strawberries, Chocolate     Vitamins/[place] currently taking: Fish Oil, Tumeric, Vitamin D3, Vitamin E     Socioeconomic Status  Who does the grocery shopping for your  household? Dad     Who prepares your meals at home? Shared     Diet Recall/Time  Breakfast: yogurt, granola bar   Lunch: skipped   Dinner: meat, vegetable, potato    Typical Snacks: chips or licorice or glass of wine     Overnight eating: No    Eating out: 1 time/week      Beverages  Coffee (black), Water 80 oz/day, Naked Juice 1 bottle/day, occasional Scotch 1 time/week     Exercise  Recumbent Bike, Walking in the Water daily for 5 minutes at at time  Stretch Lab-2 days/week for 25 minutes-60 minutes    Nutrition Diagnosis (PES statement)  Overweight/Obesity (NC 3.3) related to overeating and poor lifestyle habits as evidenced by patient's subjective statements (skipping meals, excessive energy intake) and BMI of 49.1 kg/m2.     Intervention    Nutrition Education:   1. Provided general overview of diet and lifestyle modifications needed to be a deemed a safe candidate for bariatric surgery.       Food/Nutrient Delivery:  2. Educated patient on eating three meals, with cutting out snacking.  3. Bariatric Plate: Patient and I discussed the importance of including a lean protein source (20-30 grams/meal), vegetables (included at lunch and dinner), one serving (15g) of carbohydrate, and limited added fat (1 tb/day) at each meal.   4. Educated patient on using a protein powder drink as a meal replacement and/or supplement after bariatric surgery.   5. Discussed importance of adequate hydration after surgery, with goal of at least 64 oz of fluids/day.  6. Addressed avoiding all carbonated, caffeinated and sweetened drinks to prepare for bariatric surgery.     Nutrition Counselin. Mindful eating techniques: Encouraged slow meal pace, chewing foods to applesauce consistency for 20-30 minutes/meal.   8. Discussed  fluids 30 minutes before, during, and after meal to prevent dumping syndrome and discomfort post bariatric surgery.   9. Discussed pre/post operative diet progression, post op vitamin regimen, gave  review of surgery process.     Instructions/Goals:     1. Start implementing bariatric surgery lifestyle modifications.      Handouts Provided:   [facility] Weight Management Patient Handbook (awaiting it in the mail)     Monitor/Evaluation:  Pt. s target weight: weight loss to 300 lbs or below/no gain from initial visit, patient verbalized understanding.     Plan for next visit:   Bariatric plate.   Educate on dumping syndrome and reading food labels.  (Final Supervised Diet visit with RD) pre/post-op  diet progression, give review of surgery process.      Phone call duration: 30 minutes      Alma Prasad RD

## 2021-08-02 NOTE — LETTER
8/2/2021         RE: Aby Giraldo  [address] [address]        Dear Colleague,    Thank you for referring your patient, Aby Giraldo, to the [facility] [place]. Please see a copy of my visit note below.    Aby Giraldo is a 56 year old who is being evaluated via a billable telephone visit.      What phone number would you like to be contacted at? [phone]   How would you like to obtain your AVS? Radames            Initial Structured Weight Loss Supervised Diet Evaluation     Assessment:  Aby is being seen today for initial RD nutritional evaluation. Patient has been unsuccessful with non-surgical weight loss methods and is interested in bariatric surgery. Today we reviewed current eating habits and level of physical activity, and instructed on the changes that are required for successful bariatric outcomes.    Surgery of interest per pt: NORMA.    Workflow review:  Support Group: Not completed.  Psychology:Not completed.  Lab work:Not completed.  SWL:Yes 2nd Visit     Weight goal: 300 lbs or below.    Anthropometrics:  Initial Weight: 304 lbs   Current Weight: 304 lbs   BMI: There is no height or weight on file to calculate BMI.   Ideal body weight: 59.3 kg (130 lb 11.7 oz)  Adjusted ideal body weight: 90.7 kg (200 lb 0.6 oz)    Estimated RMR ([place]-St Jeor equation):  1995 kcals x 1.3 (light active) = 2594 kcals (for weight maintenance)    Medical History:  Patient Active Problem List   Diagnosis     Morbid Obesity     Allergies     Verruca Warts     Abdominal Pain     Hypothyroidism     Hyperlipidemia     Blood In Urine     Adult Sleep Apnea     Recurrent sinus infections     Diabetes mellitus type 2 in obese (H)     Endometrial hyperplasia with atypia     Osteoarthritis of both hips     DDD (degenerative disc disease), lumbar      Diabetes: yes-Ozempic (on hold), Metformin 1 pill/day (due to upset stomach)   HbA1c:  No results found for:  HGBA1C      Nutrition History  Food allergies/intolerances/cultural or religous food customs: Yes Strawberries, Chocolate     Vitamins/Mineral currently taking: Fish Oil, Tumeric, Vitamin D3, Vitamin E     Socioeconomic Status  Who does the grocery shopping for your household? Dad     Who prepares your meals at home? Shared     Diet Recall/Time  Breakfast: yogurt, granola bar   Lunch: skipped   Dinner: meat, vegetable, potato    Typical Snacks: chips or licorice or glass of wine     Overnight eating: No    Eating out: 1 time/week      Beverages  Coffee (black), Water 80 oz/day, Naked Juice 1 bottle/day, occasional Scotch 1 time/week     Exercise  Recumbent Bike, Walking in the Water daily for 5 minutes at at time  Stretch Lab-2 days/week for 25 minutes-60 minutes    Nutrition Diagnosis (PES statement)  Overweight/Obesity (NC 3.3) related to overeating and poor lifestyle habits as evidenced by patient's subjective statements (skipping meals, excessive energy intake) and BMI of 49.1 kg/m2.     Intervention    Nutrition Education:   1. Provided general overview of diet and lifestyle modifications needed to be a deemed a safe candidate for bariatric surgery.       Food/Nutrient Delivery:  2. Educated patient on eating three meals, with cutting out snacking.  3. Bariatric Plate: Patient and I discussed the importance of including a lean protein source (20-30 grams/meal), vegetables (included at lunch and dinner), one serving (15g) of carbohydrate, and limited added fat (1 tb/day) at each meal.   4. Educated patient on using a protein powder drink as a meal replacement and/or supplement after bariatric surgery.   5. Discussed importance of adequate hydration after surgery, with goal of at least 64 oz of fluids/day.  6. Addressed avoiding all carbonated, caffeinated and sweetened drinks to prepare for bariatric surgery.     Nutrition Counselin. Mindful eating techniques: Encouraged slow meal pace, chewing foods to  applesauce consistency for 20-30 minutes/meal.   8. Discussed  fluids 30 minutes before, during, and after meal to prevent dumping syndrome and discomfort post bariatric surgery.   9. Discussed pre/post operative diet progression, post op vitamin regimen, gave review of surgery process.     Instructions/Goals:     1. Start implementing bariatric surgery lifestyle modifications.      Handouts Provided:   [facility] Weight Management Patient Handbook (awaiting it in the mail)     Monitor/Evaluation:  Pt. s target weight: weight loss to 300 lbs or below/no gain from initial visit, patient verbalized understanding.     Plan for next visit:   Bariatric plate.   Educate on dumping syndrome and reading food labels.  (Final Supervised Diet visit with RD) pre/post-op  diet progression, give review of surgery process.      Phone call duration: 30 minutes      Alma Prasad RD        Again, thank you for allowing me to participate in the care of your patient.        Sincerely,        Alma Prasad RD

## 2021-08-05 ENCOUNTER — VIRTUAL VISIT (OUTPATIENT)
Dept: FAMILY MEDICINE | Facility: CLINIC | Age: 56
End: 2021-08-05
Payer: COMMERCIAL

## 2021-08-05 DIAGNOSIS — F43.23 ADJUSTMENT DISORDER WITH MIXED ANXIETY AND DEPRESSED MOOD: Primary | ICD-10-CM

## 2021-08-05 PROCEDURE — 99213 OFFICE O/P EST LOW 20 MIN: CPT | Mod: 95 | Performed by: FAMILY MEDICINE

## 2021-08-05 RX ORDER — ESCITALOPRAM OXALATE 20 MG/1
20 TABLET ORAL DAILY
Qty: 90 TABLET | Refills: 3 | Status: SHIPPED | OUTPATIENT
Start: 2021-08-05 | End: 2022-07-12

## 2021-08-05 ASSESSMENT — ANXIETY QUESTIONNAIRES
GAD7 TOTAL SCORE: 18
2. NOT BEING ABLE TO STOP OR CONTROL WORRYING: NEARLY EVERY DAY
1. FEELING NERVOUS, ANXIOUS, OR ON EDGE: NEARLY EVERY DAY
IF YOU CHECKED OFF ANY PROBLEMS ON THIS QUESTIONNAIRE, HOW DIFFICULT HAVE THESE PROBLEMS MADE IT FOR YOU TO DO YOUR WORK, TAKE CARE OF THINGS AT HOME, OR GET ALONG WITH OTHER PEOPLE: VERY DIFFICULT
4. TROUBLE RELAXING: NEARLY EVERY DAY
3. WORRYING TOO MUCH ABOUT DIFFERENT THINGS: NEARLY EVERY DAY
5. BEING SO RESTLESS THAT IT IS HARD TO SIT STILL: NOT AT ALL
7. FEELING AFRAID AS IF SOMETHING AWFUL MIGHT HAPPEN: NEARLY EVERY DAY
6. BECOMING EASILY ANNOYED OR IRRITABLE: NEARLY EVERY DAY

## 2021-08-05 ASSESSMENT — PATIENT HEALTH QUESTIONNAIRE - PHQ9: SUM OF ALL RESPONSES TO PHQ QUESTIONS 1-9: 22

## 2021-08-05 NOTE — PROGRESS NOTES
"Aby is a 56 year old who is being evaluated via a billable video visit.      How would you like to obtain your AVS? MyChart  If the video visit is dropped, the invitation should be resent by: Text to cell phone: [phone]  Will anyone else be joining your video visit? No      Video Start Time: 12:55 PM    Assessment & Plan       ICD-10-CM    1. Adjustment disorder with mixed anxiety and depressed mood  F43.23 escitalopram (LEXAPRO) 20 MG tablet     Lexapro 10 mg to take 1/2 pill a day for 6 days then 1 daily.    Counseling is a good idea.  Some options in AVS.    Scheduled a video visit in 4 days to discuss the back pain.  And in 1 month to reassess depression and anxiety.    We helped her schedule a physical in 2 months.    We will discuss with the patient at her next visit about referring her to [facility] and Dr. Kory Llanos as he has operated on patients of mine and done a great job and has operated on obese patients.      20 minutes spent on the date of the encounter doing chart review, history and exam, documentation and further activities per the note       BMI:   Estimated body mass index is 49.07 kg/m  as calculated from the following:    Height as of 7/1/21: 1.676 m (5' 6\").    Weight as of 7/1/21: 137.9 kg (304 lb).       Depression Screening Follow Up    PHQ 8/5/2021   PHQ-9 Total Score 22   Q9: Thoughts of better off dead/self-harm past 2 weeks Not at all     Last PHQ-9 8/5/2021   1.  Little interest or pleasure in doing things 3   2.  Feeling down, depressed, or hopeless 3   3.  Trouble falling or staying asleep, or sleeping too much 3   4.  Feeling tired or having little energy 3   5.  Poor appetite or overeating 3   6.  Feeling bad about yourself 3   7.  Trouble concentrating 2   8.  Moving slowly or restless 2   Q9: Thoughts of better off dead/self-harm past 2 weeks 0   PHQ-9 Total Score 22   Difficulty at work, home, or with people Very difficult       Follow Up Actions Taken     See " above      No follow-ups on file.    Ellen Kelsey MD  [facility]    Josiah Badillo is a 56 year old who presents for the following health issues     HPI     Depression and anxiety:  increased depression and anxiety x 1 year.   for [employer], they build big projects.  Parents moved in with her.  Hurt back: Herinated disc, neurosurgeon can't have until she  looses weight  And Insurance wont cover baiatric surgeron.    She is very frustrated by all of these issues and feels like she just cannot winter get ahead.  She is willing to see a counselor and start an antidepressant.  She is willing to have a separate visit regarding her back issues and the question about a disability parking permit.    We will discuss with the patient at her next visit about referring her to [facility] and Dr. Kory Llanos as he has operated on patients of mine and done a great job and has operated on obese patients.            Review of Systems         Objective        Gen: No apparent distress on video  PHQ-9=22  HELGA-7=18  No suicidal or homicidal ideation    Vitals:  No vitals were obtained today due to virtual visit.                        Video-Visit Details    Type of service:  Video Visit    Video End Time:1:13 PM    Originating Location (pt. Location): Home    Distant Location (provider location):  [facility]     Platform used for Video Visit: [place]

## 2021-08-05 NOTE — PATIENT INSTRUCTIONS
Lexapro 10 mg to take 1/2 pill a day for 6 days then 1 daily.    Counseling is a good idea.  Some options in AVS.    Scheduled a video visit in 4 days to discuss the back pain.  And in 1 month to reassess depression and anxiety.    We helped her schedule a physical in 2 months.    We will discuss with the patient at her next visit about referring her to [place] [facility] and Dr. Kory Llanos as he has operated on patients of mine and done a great job and has operated on obese patients.    [facility]:   [facility] [phone]  [address], [place] [address]     [facility]: [phone]  [address], [facility] [address]    [facility]: [phone]  [address], [place] [address]    [facility]:  [phone]   [address] [place] [address]    [facility]:  [facility] [phone]   [address] , [address]    [place]:  [facility]: [phone]  [address] [place].     [place]:  [facility]  [phone]  [address], [place] [address]    [facility] [phone]    [address], [facility]  [address]    [facility] /[phone]   [address], [address]    [facility] [phone] [address], [address]    [facility] [phone]   [address]

## 2021-08-06 ASSESSMENT — ANXIETY QUESTIONNAIRES: GAD7 TOTAL SCORE: 18

## 2021-08-09 ENCOUNTER — TELEPHONE (OUTPATIENT)
Dept: FAMILY MEDICINE | Facility: CLINIC | Age: 56
End: 2021-08-09

## 2021-08-09 ENCOUNTER — VIRTUAL VISIT (OUTPATIENT)
Dept: FAMILY MEDICINE | Facility: CLINIC | Age: 56
End: 2021-08-09
Payer: COMMERCIAL

## 2021-08-09 DIAGNOSIS — M48.061 SPINAL STENOSIS OF LUMBAR REGION, UNSPECIFIED WHETHER NEUROGENIC CLAUDICATION PRESENT: ICD-10-CM

## 2021-08-09 DIAGNOSIS — M43.16 SPONDYLOLISTHESIS OF LUMBAR REGION: ICD-10-CM

## 2021-08-09 DIAGNOSIS — M51.369 DDD (DEGENERATIVE DISC DISEASE), LUMBAR: Primary | ICD-10-CM

## 2021-08-09 DIAGNOSIS — Z11.4 SCREENING FOR HIV (HUMAN IMMUNODEFICIENCY VIRUS): ICD-10-CM

## 2021-08-09 DIAGNOSIS — Z12.11 SCREEN FOR COLON CANCER: ICD-10-CM

## 2021-08-09 PROCEDURE — 99214 OFFICE O/P EST MOD 30 MIN: CPT | Mod: 95 | Performed by: FAMILY MEDICINE

## 2021-08-09 NOTE — TELEPHONE ENCOUNTER
Reason for Call:  Other appointment    Detailed comments: pt seen by Dr Kelsey this AM & was referred to Dr Llanos - pt called his office & they will not see unless she has updated MRI     Pt is requesting that Dr Kelsey order MRI so she can have done then will schedule with Dr Llanos    Phone Number Patient can be reached at: Cell number on file:    Telephone Information:   Mobile [phone]       Best Time: na    Can we leave a detailed message on this number? YES    Call taken on 8/9/2021 at 11:19 AM by Heaven Parker

## 2021-08-09 NOTE — PROGRESS NOTES
Aby is a 56 year old who is being evaluated via a billable video visit.      How would you like to obtain your AVS? MyChart  If the video visit is dropped, the invitation should be resent by: Text to cell phone: [phone]  Will anyone else be joining your video visit? No      Video Start Time: 10:45 AM      ICD-10-CM    1. DDD (degenerative disc disease), lumbar  M51.36 Spine Referral   2. Screen for colon cancer  Z12.11    3. Screening for HIV (human immunodeficiency virus)  Z11.4    4. Spinal stenosis of lumbar region, unspecified whether neurogenic claudication present  M48.061 Spine Referral   5. Spondylolisthesis of lumbar region  M43.16 Spine Referral              Limit Ibuprofen since kidney function was borderline in the pharmacist told her to limit this or not take while on Lexapro.    Tyelonol 1000 mg  4 times a day.     Given referral to Dr. Llanos at [facility] as he has operated on obese patients, if this is the course of action needed.  She would like to see the spine surgeon to discuss surgical and nonsurgical options for her.    She will check with the spine doctor about pain management and we can reassess in 1 month at our video visit.         Disability parking permit given for 1 year:  Cannot walk 200 feet without stopping to rest.    30 minutes spent with this patient including chart review, patient visit and documentation.    Subjective   Aby is a 56 year old who presents for the following health issues     HPI   Back Pain  discuss back pain x 1.5 yrs, poss new referral  Low back and hip pain for 1.5 years.  She thinks this is possibly because she has a sedentary job is overweight and did a very large  did [employer] restoration herself in 2019.  By Mar 2020, chidi had severe muscle knots.  Tried to strecth on own and keeping awake at night. Saw chiropractor, the covid hit and could not doctor.  Then saw primary MD and then saw spine clinic, tried steroid injeciton in left hip,  right hip then low back.  Helped for hips, but not low back. Feels like low back muscles and leg mucsles atrophied and got very weak. Then did PT.  Finished PT and started a place [facility] to get strechedout and going 3 times a week. Does massage, TENS units, hot baths, ice, gabapentin and did not help.  This year saw the neurosurgeon and did back x-ray.  Per patient :  Herniated discs,  spohdylthiesis, spinal stenosis.  Recommended to see bariatric to loose octavio tot then be able to have spine surgery.  Insurance does not cover bariatric surgery.  Walking is difficult secondary to pain.  Hard to get to floor and then hard to getup.  She is very frustrated as she would like to be able to exercise and also not have the back pain.    Neuro surg 6-9-21  CONSULTATION ASSESSMENT AND PLAN:    54 yo female who presents with chronic low back pain and b/l LE weakness.  MRI of the lumbar spine shows moderate spinal canal narrowing at lumbar 4-5 with mild foraminal narrowing as well as moderate narrowing of the lateral recesses at L5-S1.  Lumbar x-ray shows 13 mm anterolisthesis of lumbar 4 5 in neutral position which decreases to 9 mm on extension and increases to 14 mm on flexion.  Patient's current BMI is 48.83.  We discussed that although her low back pain and leg symptoms are mostly likely attributable to her lumbar findings treatment would involve a lumbar fusion given her instability which ideally she would have a BMI under 40 to undergo surgery.  She was set up to see the bariatric clinic last year prior to the onset of Covid.  She is interested in reconsulting with them to see if there is any surgical options for weight loss.  An order has been placed. She is to return to clinic if any new or worsening neurological symptoms.             Review of Systems         Objective       L-spine MRI Augh2020  IMPRESSION:  1.  Mild spinal canal narrowing and mild narrowing of the lateral recesses at L4-L5.  2.   Moderate narrowing of the lateral recesses at L5-S1. No high-grade neural foraminal narrowing.  3.  Modic type II changes at L4-L5 and L5-S1.  4.  Moderate degenerative changes of the left hip joint.     Sacral MRI Aug2020    IMPRESSION:  1.  Mild bilateral sacroiliac joint osteoarthritis.  2.  No evidence for sacral or coccygeal fracture. No stress/insufficiency fracture.  3.  Probable posterior wall uterine fibroid. Complex material present within the endometrium. Correlation with pelvic ultrasound recommended.  4.  Multilevel lumbar spondylosis. See separate lumbar spine MRI report.     Lumbar spine x-ray  6-7-21  IMPRESSION:  5 lumbar type vertebra. Vertebral body height is normal. There is 13 mm anterolisthesis of L4 on L5 which decreases to 9 mm on extension and increases to 14 mm on flexion. There is 4 mm anterolisthesis of L5 on S1 which is stable on extension   and flexion. Alignment is otherwise normal.   Vitals:  No vitals were obtained today due to virtual visit.    Physical Exam   Gen: No apparent distresson video                  Video-Visit Details    Type of service:  Video Visit    Video End Time:11:08 AM    Originating Location (pt. Location):     Distant Location (provider location):  [facility]     [facility] for Video Visit: [facility]

## 2021-08-11 DIAGNOSIS — M48.061 SPINAL STENOSIS OF LUMBAR REGION, UNSPECIFIED WHETHER NEUROGENIC CLAUDICATION PRESENT: ICD-10-CM

## 2021-08-11 DIAGNOSIS — M51.369 DDD (DEGENERATIVE DISC DISEASE), LUMBAR: Primary | ICD-10-CM

## 2021-08-11 DIAGNOSIS — M43.16 SPONDYLOLISTHESIS OF LUMBAR REGION: ICD-10-CM

## 2021-08-18 ENCOUNTER — HOSPITAL ENCOUNTER (OUTPATIENT)
Dept: MRI IMAGING | Facility: HOSPITAL | Age: 56
End: 2021-08-18
Attending: FAMILY MEDICINE
Payer: COMMERCIAL

## 2021-08-18 ENCOUNTER — ANCILLARY PROCEDURE (OUTPATIENT)
Dept: MAMMOGRAPHY | Facility: CLINIC | Age: 56
End: 2021-08-18
Attending: FAMILY MEDICINE
Payer: COMMERCIAL

## 2021-08-18 DIAGNOSIS — Z12.31 VISIT FOR SCREENING MAMMOGRAM: ICD-10-CM

## 2021-08-18 DIAGNOSIS — M51.369 DDD (DEGENERATIVE DISC DISEASE), LUMBAR: ICD-10-CM

## 2021-08-18 DIAGNOSIS — M48.061 SPINAL STENOSIS OF LUMBAR REGION, UNSPECIFIED WHETHER NEUROGENIC CLAUDICATION PRESENT: ICD-10-CM

## 2021-08-18 DIAGNOSIS — M43.16 SPONDYLOLISTHESIS OF LUMBAR REGION: ICD-10-CM

## 2021-08-18 PROCEDURE — 77067 SCR MAMMO BI INCL CAD: CPT

## 2021-08-18 PROCEDURE — 72148 MRI LUMBAR SPINE W/O DYE: CPT

## 2021-08-18 PROCEDURE — 72195 MRI PELVIS W/O DYE: CPT

## 2021-08-23 ENCOUNTER — TELEPHONE (OUTPATIENT)
Dept: PHYSICAL MEDICINE AND REHAB | Facility: CLINIC | Age: 56
End: 2021-08-23

## 2021-08-23 NOTE — TELEPHONE ENCOUNTER
PSP: Samantha Abdi PA-C  Last clinic visit: 12/18/21 OV  Reason for call: Having bilateral hip pain; wants to schedule injections  Clinical information: Chart reviewed. Patient has had a Lumbar MRI as ordered by [facility]. Patient also referred [facility] to [facility] on 8[facility]9.   Advice given to patient: Explained that as she was last seen almost 9 months ago and has updated lumbar imaging, she should be evaluated by [facility] to determine appropriate treatment. Transferred to scheduling to make appointment.   Provider to address: BILL

## 2021-08-30 ENCOUNTER — OFFICE VISIT (OUTPATIENT)
Dept: PHYSICAL MEDICINE AND REHAB | Facility: CLINIC | Age: 56
End: 2021-08-30
Payer: COMMERCIAL

## 2021-08-30 VITALS
DIASTOLIC BLOOD PRESSURE: 80 MMHG | HEIGHT: 66 IN | WEIGHT: 293 LBS | HEART RATE: 77 BPM | BODY MASS INDEX: 47.09 KG/M2 | SYSTOLIC BLOOD PRESSURE: 134 MMHG

## 2021-08-30 DIAGNOSIS — M48.062 SPINAL STENOSIS OF LUMBAR REGION WITH NEUROGENIC CLAUDICATION: ICD-10-CM

## 2021-08-30 DIAGNOSIS — M16.0 PRIMARY OSTEOARTHRITIS OF BOTH HIPS: Primary | ICD-10-CM

## 2021-08-30 DIAGNOSIS — M43.16 SPONDYLOLISTHESIS OF LUMBAR REGION: ICD-10-CM

## 2021-08-30 PROCEDURE — 99214 OFFICE O/P EST MOD 30 MIN: CPT | Performed by: PHYSICIAN ASSISTANT

## 2021-08-30 ASSESSMENT — PAIN SCALES - GENERAL: PAINLEVEL: SEVERE PAIN (7)

## 2021-08-30 ASSESSMENT — MIFFLIN-ST. JEOR: SCORE: 1990.68

## 2021-08-30 NOTE — LETTER
8/30/2021         RE: Aby Giraldo  [address] [address]        Dear Colleague,    Thank you for referring your patient, Aby Giraldo, to the [facility] [place]. Please see a copy of my visit note below.    Assessment:   Aby Giraldo is a 55 y.o. y.o. female with past medical history significant for morbid obesity, hypothyroidism, hyperlipidemia, type 2 diabetes mellitus,  who presents today for follow-up regarding chronic pain involving the low back, hips, legs.  Symptoms appear multifactorial.  Pain is thought to be related to osteoarthritis of the hips as well as lumbar spinal stenosis. My review of an MRI lumbar spine shows moderate to severe spinal stenosis at L4-5 related to a disc bulge and moderate facet arthropathy.  Finally, she appears to be symptomatic from lower lumbar facet arthropathy/mild SI joint degenerative change.    -Patient status post ultrasound-guided hip joint injections (left September 1, 2020 and right November 4, 2020) which have provided 100% relief of her hip pain for over 6 months.  -She then underwent a bilateral L4-5 transforaminal epidural steroid injection December 2, 2020 which provided 80% relief of her back pain and significant improvement in the radicular pain down her leg (shooting pain into the great toe), but the relief only lasted several weeks.    -Patient was evaluated by Dr. Casiano June 9, 2021.  Dr. Casiano recommended bariatric referral for weight loss before considering lumbar fusion.  Bariatric surgery was not covered by her insurance.  -Patient saw Dr. Llanos last week for a second opinion.  He recommended L3-S1 decompression and L4-S1 fusion.  Patient is planning to move forward with the surgery but needs to complete additional physical therapy first for insurance purposes.       Plan:     A shared decision making plan was used.  The patient's values and choices were respected.  The following represents what was  discussed and decided upon by the physician assistant and the patient.      1.  DIAGNOSTIC TESTS: I reviewed the MRI lumbar spine and MRI sacroiliac joints.  No further diagnostic tests were ordered.    2.  PHYSICAL THERAPY: Patient completed 9 sessions of physical therapy December 28, 2020.    -I entered a referral for the patient to return to physical therapy.  She reports this is needed for authorization for her fusion surgery.    3.  MEDICATIONS:   -Gabapentin was not helpful so she stopped taking it.  -Cyclobenzaprine was not helpful so she stopped taking it.  -Patient can continue Tylenol and ibuprofen as needed.  -I offered for the patient to trial Lyrica.  Patient would like to discuss this with her primary care provider first.        4.  INTERVENTIONS:    -I offered repeat bilateral intra-articular hip joint injections under ultrasound guidance with Dr. Vora.  Patient indicated she would like to proceed and an order was placed.  -Patient did not feel like the lower back injections provided long enough lasting relief to repeat.  -Second Covid vaccine was April 2021.    5.  PATIENT EDUCATION:   -The patient I discussed that she may also need hip replacement surgery for her hip osteoarthritis.  At this point patient would like to pursue the back surgery first.  I do think she is symptomatic from both problems.  If she wants a referral to orthopedics I am happy to enter 1 for her.  -Patient is in agreement the above plan.  All questions were answered.    6.  FOLLOW-UP: Patient can follow-up with me 2 weeks after her bilateral hip joint injections.  If she has questions or concerns in the meantime, she should not hesitate to call.    Subjective:     Aby Giraldo is a 55 y.o. female who presents today for follow-up regarding chronic pain involving the low back, hips, lower extremities.  Pain is thought to be multifactorial.  I last saw the patient December 18, 2020.  Since I saw the patient she saw   Oh hua, 2021.  Dr. Casiano recommended weight loss before considering lumbar spine surgery.  She sought a second opinion from Dr. Llanos last week.  Dr. Llanos offered her an L3-S1 decompression and L4-S1 fusion.  Patient would like to pursue surgery.  She was told that her insurance is requiring additional physical therapy first.  She is interested in repeating her hip joint injections in the meantime to help with her hip pain.  These injections were helpful for her last year and lasted greater than 6 months.  The lower back injections only provided relief for several weeks.    Patient complains of bilateral hip pain.  She complains of pain in the groin and lateral hips bilaterally.  She feels clicking in both hips.  She also complains of bilateral low back pain.  Pain extends into the buttocks.  She denies pain further down the legs but she gets numbness and tingling in the bilateral posterior calves and feet.  She states that she feels very weak in her legs especially with flexing at the hips.  She endorses loss of bladder control.  Denies loss of bowel control.  She rates her pain today as a 7 out of 10.  At its worst it is a 9 and 10.  At its best it is a 5-10.  Pain is aggravated with walking and alleviated with lying down.    Patient completed 9 sessions of physical therapy December 20, 2020.  She has tried chiropractic treatment, ultrasound treatments, TENS unit, massage.  She is taking Tylenol and ibuprofen as needed which are not helpful.  Gabapentin was not helpful so she stopped taking it.  Cyclobenzaprine was not helpful so she stopped taking it.    Past medical history is reviewed and is pertinent for the consultation with Dr. Llanos next week.  He offered a multilevel fusion.    Review of Systems:  Positive for numbness/tingling, weakness, loss of bladder control.  Negative for loss of bowel control, inability to urinate, headache, pain much worse at night, trip/stumble/falls,  difficulty swallowing, difficulty with hand skills, fevers, unintentional weight loss.     Objective:   CONSTITUTIONAL:  Vital signs as above.  No acute distress.  The patient is well nourished and well groomed.  Patient is obese.  PSYCHIATRIC:  The patient is awake, alert, oriented to person, place and time.  The patient is answering questions appropriately with clear speech.  Normal affect.  HEENT: Normocephalic, atraumatic.  Sclera clear.    SKIN:  Skin over the face, posterior torso, bilateral upper and lower extremities is clean, dry, intact without rashes.    VASCULAR: No significant lower extremity edema.  MUSCULOSKELETAL:  Gait is Trendelenburg.  She is able to rise onto toes and heels bilaterally with support.  Tender to palpation bilateral lower lumbar paraspinous muscles.  The patient has 4/5 left hip flexor, otherwise 5/5 strength for the right hip flexors, bilateral knee flexors/extensors, ankle dorsiflexors/plantar flexors, ankle evertors/invertors.  Internal and external rotation of both hips is restricted with reproduction of pain localizing to the lateral hips and groin.  NEUROLOGICAL: 1+ patellar reflexes which are symmetric bilaterally.  No ankle clonus bilaterally.  Sensation to light touch is intact in the bilateral L4, L5, and S1 dermatomes.       RESULTS:    I reviewed the MRI lumbar spine from [facility] dated August 18, 2021.  At L3-4 there is a central disc protrusion and mild facet arthropathy with moderate spinal canal stenosis.  At L4-5 there is a shallow disc bulge and moderate facet arthropathy with moderate to severe spinal stenosis.  At L5-S1 there is a disc bulge with mild right and moderate left facet arthropathy causing moderate spinal canal stenosis and mild bilateral foraminal stenosis.    I reviewed the MRI of the sacroiliac joints from [facility] dated August 18, 2021.  This shows mild degenerative changes bilateral sacroiliac joints.  There are advanced degenerative changes  bilateral hips, partially imaged.      Again, thank you for allowing me to participate in the care of your patient.        Sincerely,        Samantha Abdi PA-C

## 2021-08-30 NOTE — PROGRESS NOTES
Assessment:   Aby Giraldo is a 55 y.o. y.o. female with past medical history significant for morbid obesity, hypothyroidism, hyperlipidemia, type 2 diabetes mellitus,  who presents today for follow-up regarding chronic pain involving the low back, hips, legs.  Symptoms appear multifactorial.  Pain is thought to be related to osteoarthritis of the hips as well as lumbar spinal stenosis. My review of an MRI lumbar spine shows moderate to severe spinal stenosis at L4-5 related to a disc bulge and moderate facet arthropathy.  Finally, she appears to be symptomatic from lower lumbar facet arthropathy/mild SI joint degenerative change.    -Patient status post ultrasound-guided hip joint injections (left September 1, 2020 and right November 4, 2020) which have provided 100% relief of her hip pain for over 6 months.  -She then underwent a bilateral L4-5 transforaminal epidural steroid injection December 2, 2020 which provided 80% relief of her back pain and significant improvement in the radicular pain down her leg (shooting pain into the great toe), but the relief only lasted several weeks.    -Patient was evaluated by Dr. Casiano June 9, 2021.  Dr. Casiano recommended bariatric referral for weight loss before considering lumbar fusion.  Bariatric surgery was not covered by her insurance.  -Patient saw Dr. Llanos last week for a second opinion.  He recommended L3-S1 decompression and L4-S1 fusion.  Patient is planning to move forward with the surgery but needs to complete additional physical therapy first for insurance purposes.       Plan:     A shared decision making plan was used.  The patient's values and choices were respected.  The following represents what was discussed and decided upon by the physician assistant and the patient.      1.  DIAGNOSTIC TESTS: I reviewed the MRI lumbar spine and MRI sacroiliac joints.  No further diagnostic tests were ordered.    2.  PHYSICAL THERAPY: Patient completed 9 sessions of  physical therapy December 28, 2020.    -I entered a referral for the patient to return to physical therapy.  She reports this is needed for authorization for her fusion surgery.    3.  MEDICATIONS:   -Gabapentin was not helpful so she stopped taking it.  -Cyclobenzaprine was not helpful so she stopped taking it.  -Patient can continue Tylenol and ibuprofen as needed.  -I offered for the patient to trial Lyrica.  Patient would like to discuss this with her primary care provider first.        4.  INTERVENTIONS:    -I offered repeat bilateral intra-articular hip joint injections under ultrasound guidance with Dr. Vora.  Patient indicated she would like to proceed and an order was placed.  -Patient did not feel like the lower back injections provided long enough lasting relief to repeat.  -Second Covid vaccine was April 2021.    5.  PATIENT EDUCATION:   -The patient I discussed that she may also need hip replacement surgery for her hip osteoarthritis.  At this point patient would like to pursue the back surgery first.  I do think she is symptomatic from both problems.  If she wants a referral to orthopedics I am happy to enter 1 for her.  -Patient is in agreement the above plan.  All questions were answered.    6.  FOLLOW-UP: Patient can follow-up with me 2 weeks after her bilateral hip joint injections.  If she has questions or concerns in the meantime, she should not hesitate to call.    Subjective:     Aby Giraldo is a 55 y.o. female who presents today for follow-up regarding chronic pain involving the low back, hips, lower extremities.  Pain is thought to be multifactorial.  I last saw the patient December 18, 2020.  Since I saw the patient she saw Dr. Oh hua, 2021.  Dr. Casiano recommended weight loss before considering lumbar spine surgery.  She sought a second opinion from Dr. Llanos last week.  Dr. Llanos offered her an L3-S1 decompression and L4-S1 fusion.  Patient would like to pursue  surgery.  She was told that her insurance is requiring additional physical therapy first.  She is interested in repeating her hip joint injections in the meantime to help with her hip pain.  These injections were helpful for her last year and lasted greater than 6 months.  The lower back injections only provided relief for several weeks.    Patient complains of bilateral hip pain.  She complains of pain in the groin and lateral hips bilaterally.  She feels clicking in both hips.  She also complains of bilateral low back pain.  Pain extends into the buttocks.  She denies pain further down the legs but she gets numbness and tingling in the bilateral posterior calves and feet.  She states that she feels very weak in her legs especially with flexing at the hips.  She endorses loss of bladder control.  Denies loss of bowel control.  She rates her pain today as a 7 out of 10.  At its worst it is a 9 and 10.  At its best it is a 5-10.  Pain is aggravated with walking and alleviated with lying down.    Patient completed 9 sessions of physical therapy December 20, 2020.  She has tried chiropractic treatment, ultrasound treatments, TENS unit, massage.  She is taking Tylenol and ibuprofen as needed which are not helpful.  Gabapentin was not helpful so she stopped taking it.  Cyclobenzaprine was not helpful so she stopped taking it.    Past medical history is reviewed and is pertinent for the consultation with Dr. Llanos next week.  He offered a multilevel fusion.    Review of Systems:  Positive for numbness/tingling, weakness, loss of bladder control.  Negative for loss of bowel control, inability to urinate, headache, pain much worse at night, trip/stumble/falls, difficulty swallowing, difficulty with hand skills, fevers, unintentional weight loss.     Objective:   CONSTITUTIONAL:  Vital signs as above.  No acute distress.  The patient is well nourished and well groomed.  Patient is obese.  PSYCHIATRIC:  The patient is  awake, alert, oriented to person, place and time.  The patient is answering questions appropriately with clear speech.  Normal affect.  HEENT: Normocephalic, atraumatic.  Sclera clear.    SKIN:  Skin over the face, posterior torso, bilateral upper and lower extremities is clean, dry, intact without rashes.    VASCULAR: No significant lower extremity edema.  MUSCULOSKELETAL:  Gait is Trendelenburg.  She is able to rise onto toes and heels bilaterally with support.  Tender to palpation bilateral lower lumbar paraspinous muscles.  The patient has 4/5 left hip flexor, otherwise 5/5 strength for the right hip flexors, bilateral knee flexors/extensors, ankle dorsiflexors/plantar flexors, ankle evertors/invertors.  Internal and external rotation of both hips is restricted with reproduction of pain localizing to the lateral hips and groin.  NEUROLOGICAL: 1+ patellar reflexes which are symmetric bilaterally.  No ankle clonus bilaterally.  Sensation to light touch is intact in the bilateral L4, L5, and S1 dermatomes.       RESULTS:    I reviewed the MRI lumbar spine from [facility] dated August 18, 2021.  At L3-4 there is a central disc protrusion and mild facet arthropathy with moderate spinal canal stenosis.  At L4-5 there is a shallow disc bulge and moderate facet arthropathy with moderate to severe spinal stenosis.  At L5-S1 there is a disc bulge with mild right and moderate left facet arthropathy causing moderate spinal canal stenosis and mild bilateral foraminal stenosis.    I reviewed the MRI of the sacroiliac joints from [facility] dated August 18, 2021.  This shows mild degenerative changes bilateral sacroiliac joints.  There are advanced degenerative changes bilateral hips, partially imaged.

## 2021-08-31 ENCOUNTER — HOSPITAL ENCOUNTER (OUTPATIENT)
Dept: PHYSICAL THERAPY | Facility: CLINIC | Age: 56
Setting detail: THERAPIES SERIES
End: 2021-08-31
Attending: PHYSICIAN ASSISTANT
Payer: COMMERCIAL

## 2021-08-31 DIAGNOSIS — M48.062 SPINAL STENOSIS OF LUMBAR REGION WITH NEUROGENIC CLAUDICATION: ICD-10-CM

## 2021-08-31 DIAGNOSIS — M16.0 PRIMARY OSTEOARTHRITIS OF BOTH HIPS: Primary | ICD-10-CM

## 2021-08-31 DIAGNOSIS — M43.16 SPONDYLOLISTHESIS OF LUMBAR REGION: ICD-10-CM

## 2021-08-31 PROCEDURE — 97162 PT EVAL MOD COMPLEX 30 MIN: CPT | Mod: GP | Performed by: PHYSICAL THERAPIST

## 2021-08-31 PROCEDURE — 97116 GAIT TRAINING THERAPY: CPT | Mod: GP | Performed by: PHYSICAL THERAPIST

## 2021-08-31 NOTE — DISCHARGE INSTRUCTIONS
8/31/21    Use cane for balance, use in house.  Then outside when you go to car.     Try gluteal sets, quad sets like in PT  Ankle and toe movement.      Curly no tie shoe laces - online.     Schedule w/ hernan for PT with Anisha  430 pm thurs 9/2.  PT w/ me.     Anisha   [facility]

## 2021-09-01 ENCOUNTER — VIRTUAL VISIT (OUTPATIENT)
Dept: SURGERY | Facility: CLINIC | Age: 56
End: 2021-09-01
Payer: COMMERCIAL

## 2021-09-01 DIAGNOSIS — Z71.3 NUTRITIONAL COUNSELING: ICD-10-CM

## 2021-09-01 DIAGNOSIS — E66.813 CLASS 3 SEVERE OBESITY DUE TO EXCESS CALORIES WITH SERIOUS COMORBIDITY AND BODY MASS INDEX (BMI) OF 45.0 TO 49.9 IN ADULT (H): Primary | ICD-10-CM

## 2021-09-01 DIAGNOSIS — E66.01 CLASS 3 SEVERE OBESITY DUE TO EXCESS CALORIES WITH SERIOUS COMORBIDITY AND BODY MASS INDEX (BMI) OF 45.0 TO 49.9 IN ADULT (H): Primary | ICD-10-CM

## 2021-09-01 PROCEDURE — 99207 PR NO CHARGE LOS: CPT | Performed by: DIETITIAN, REGISTERED

## 2021-09-01 NOTE — LETTER
9/1/2021         RE: Aby Giraldo  [address] [place] [address]        Dear Colleague,    Thank you for referring your patient, Aby Giraldo, to the [facility]. Please see a copy of my visit note below.    MARIAH Notes:     Patient just recently found out that her insurance does not cover Bariatric Surgery, therefore she is going to hold off for now and just focus on her back and getting surgery on her back instead.  Patient declined any further nutrition intervention at this time and concluded this visit.  Noted that she may reach out in the future for assistance after she gets things straighten out regarding her back.  Spent less than 5 minutes with patient.          Again, thank you for allowing me to participate in the care of your patient.        Sincerely,        Alma Prasad, MARIAH

## 2021-09-01 NOTE — PROGRESS NOTES
MARIAH Notes:     Patient just recently found out that her insurance does not cover Bariatric Surgery, therefore she is going to hold off for now and just focus on her back and getting surgery on her back instead.  Patient declined any further nutrition intervention at this time and concluded this visit.  Noted that she may reach out in the future for assistance after she gets things straighten out regarding her back.  Spent less than 5 minutes with patient.

## 2021-09-02 ENCOUNTER — VIRTUAL VISIT (OUTPATIENT)
Dept: FAMILY MEDICINE | Facility: CLINIC | Age: 56
End: 2021-09-02
Payer: COMMERCIAL

## 2021-09-02 DIAGNOSIS — E11.69 DIABETES MELLITUS TYPE 2 IN OBESE: ICD-10-CM

## 2021-09-02 DIAGNOSIS — F43.23 ADJUSTMENT DISORDER WITH MIXED ANXIETY AND DEPRESSED MOOD: ICD-10-CM

## 2021-09-02 DIAGNOSIS — E66.9 DIABETES MELLITUS TYPE 2 IN OBESE: ICD-10-CM

## 2021-09-02 DIAGNOSIS — M51.369 DDD (DEGENERATIVE DISC DISEASE), LUMBAR: Primary | ICD-10-CM

## 2021-09-02 PROCEDURE — 99214 OFFICE O/P EST MOD 30 MIN: CPT | Mod: 95 | Performed by: FAMILY MEDICINE

## 2021-09-02 RX ORDER — METFORMIN HCL 500 MG
500 TABLET, EXTENDED RELEASE 24 HR ORAL
Qty: 90 TABLET | Refills: 3 | Status: SHIPPED | OUTPATIENT
Start: 2021-09-02 | End: 2022-04-04

## 2021-09-02 RX ORDER — PREGABALIN 200 MG/1
200 CAPSULE ORAL 2 TIMES DAILY
Qty: 180 CAPSULE | Refills: 3 | Status: SHIPPED | OUTPATIENT
Start: 2021-09-02 | End: 2022-02-02

## 2021-09-02 ASSESSMENT — ANXIETY QUESTIONNAIRES
2. NOT BEING ABLE TO STOP OR CONTROL WORRYING: MORE THAN HALF THE DAYS
6. BECOMING EASILY ANNOYED OR IRRITABLE: SEVERAL DAYS
7. FEELING AFRAID AS IF SOMETHING AWFUL MIGHT HAPPEN: NOT AT ALL
5. BEING SO RESTLESS THAT IT IS HARD TO SIT STILL: NOT AT ALL
3. WORRYING TOO MUCH ABOUT DIFFERENT THINGS: MORE THAN HALF THE DAYS
1. FEELING NERVOUS, ANXIOUS, OR ON EDGE: MORE THAN HALF THE DAYS
GAD7 TOTAL SCORE: 9

## 2021-09-02 ASSESSMENT — PATIENT HEALTH QUESTIONNAIRE - PHQ9
SUM OF ALL RESPONSES TO PHQ QUESTIONS 1-9: 13
5. POOR APPETITE OR OVEREATING: MORE THAN HALF THE DAYS

## 2021-09-02 NOTE — PROGRESS NOTES
Aby is a 56 year old who is being evaluated via a billable video visit.      How would you like to obtain your AVS? MyChart  If the video visit is dropped, the invitation should be resent by: Text to cell phone: [phone]  Will anyone else be joining your video visit? No    Video Start Time: 3:35 PM    Assessment/plan:      ICD-10-CM    1. DDD (degenerative disc disease), lumbar  M51.36 Pregabalin (LYRICA) 200 MG capsule   2. Diabetes mellitus type 2 in obese (H)  E11.69 metFORMIN (GLUCOPHAGE-XR) 500 MG 24 hr tablet    E66.9    3. Adjustment disorder with mixed anxiety and depressed mood  F43.23      Continue Lexapro 20 mg daily.    If needed we could discuss a small dose of Wellbutrin or BuSpar to add on at her physical.    Use Tylenol 1000 mg max of 4 times a day or every 6 hours to a max of 4000 mg in 24 hours.    Not on Advil because pharmacist said not to take that with Lexapro.    Starting Lyrica 200 mg twice a day    Because of GI upset were changing regular Metformin to Metformin  mg daily with breakfast.    We can help set a preop within 30 days when her surgery is scheduled.    30 minutes spent with this patient including chart review, patient visit and documentation.    Subjective   Aby is a 56 year old who presents for the following health issues     HPI   CC:Talk about a pain medication    Spinal stenosis:  Last visit given Aug. 6, 2021.  referral to Dr. Llanos or [facility].  Really likes him. recommend spianl fusion at L4-5 and L5-1 and decompression of L3-S1.  Want to do anterior fusion and will have genreral surgeon to help with that, than posterior decompression.  He is working on getting approval from insurance.  Insurance wants her to have 6 more sessions of PT. Doing PT and the therapist did not feel it would be helpful and if she can write a discharge summary and hopefully can set surgery date set.  PT knockd her out and causes more pain.  They will then set the surgery date  , then meet with general surgeon. And then have a CT.  The general surgeon will order the CT they want.  The more she walks, the more her back hurts.  Stopped Advil since pharmacist told her to stop with Lexapro.  Gabapentin did not help, but did make her sleep.  On Tylenol tid.     Also has arthritis in hips that showed up in hips.  Surgeon recommended hip steroid injections this is set up in Sept.  Did verify with the surgeon that steroid injections wont delay surgeon.      Depression and anxiety:  On Lexapro 20 mg a day.  Within 2 days felt happier. Still feesl a bit down.  Doing therapy with Ronald Turpin. Wonders about and incrase in dose.   On 20 mg, max dose.              Review of Systems         Objective    Vitals - Patient Reported  Systolic (Patient Reported): 134  Diastolic (Patient Reported): 80  Weight (Patient Reported): 137.9 kg (304 lb)    PHQ-9  22, 13  HELGA-7  18 , 9    Physical Exam   Gen: No apparent distress on video                Video-Visit Details    Type of service:  Video Visit    Video End Time:4:07 PM    Originating Location (pt. Location): Home    Distant Location (provider location):  [facility]     Platform used for Video Visit: [employer]

## 2021-09-03 ENCOUNTER — TELEPHONE (OUTPATIENT)
Dept: FAMILY MEDICINE | Facility: CLINIC | Age: 56
End: 2021-09-03

## 2021-09-03 ASSESSMENT — ANXIETY QUESTIONNAIRES: GAD7 TOTAL SCORE: 9

## 2021-09-03 NOTE — PATIENT INSTRUCTIONS
Physical set for October  Continue Lexapro 20 mg daily.    If needed we could discuss a small dose of Wellbutrin or BuSpar to add on at her physical.    Use Tylenol 1000 mg max of 4 times a day or every 6 hours to a max of 4000 mg in 24 hours.    Not on Advil because pharmacist said not to take that with Lexapro.    Starting Lyrica 200 mg twice a day    Because of GI upset were changing regular Metformin to Metformin  mg daily with breakfast.    We can help set a preop within 30 days when her surgery is scheduled.

## 2021-09-03 NOTE — TELEPHONE ENCOUNTER
----- Message from Anisha Cazares PT sent at 9/3/2021  8:28 AM CDT -----  Regarding: d/c PT  [facility].    I worked twice with this nice lady- very motivated - and she does not tolerate further PT- increased pain noted with simple exercises.  I did issue a loaner cane and she will obtain her own as well.  This helps with balance for now.     Abhi Cazares PT  [email or web address]  [phone]    Will save this message to the chart for insurance purposes. She needed to do the PT for surgery to be covered.  Ellen Kelsey MD

## 2021-09-03 NOTE — PROGRESS NOTES
08/31/21 1300   General Information   Start of Care Date 08/31/21   Referring Physician Samantha Abdi   Orders Evaluate and Treat as Indicated   Order Date 08/30/21   Medical Diagnosis lumbar spinal stenosis.  neurogenic claudication.     Special Instructions per pt 10 years of pain.  had PT winter 2020 last winter.    Surgical/Medical history reviewed Yes   Pertinent history of current problem (include personal factors and/or comorbidities that impact the POC) only drives 1/2 hour due to leg and back pain.  sleeps on partial stomach and partial right side. harder on L side.     Pertinent Visual History  glasses.    Prior level of functional mobility Ambulation   Ambulation used to get mail.  across street. from spring 2021.  parents live w/ her.  laundry.  is on upstairs.  laundry is in bedroom level.  in/out shower ok.  walk in shower.    Current Community Support Family/friend caregiver   Patient role/Employment history Employed   Living environment House/[facility]   Home/Community Accessibility Comments has office at home. 2 screens.  feels ergonomics are good.     Patient/Family Goals Statement  have surgery.  relieve pain.    General Information Comments surgery is ASAP.   surgery - 2 level lumbar fusion and 3 level decompression/    Fall Risk Screen   Fall screen completed by PT   Have you fallen 2 or more times in the past year? No   Have you fallen and had an injury in the past year? No   Fall screen comments standing makes it worse.    Pain   Patient currently in pain Yes   Pain rating 7   Pain comments tylenol but none today.  pain worse at night.     Cognitive Status Examination   Orientation orientation to person, place and time   Level of Consciousness alert   Follows Commands and Answers Questions 100% of the time   Personal Safety and Judgment intact   Memory intact   Integumentary   Integumentary No deficits were identified   Posture   Posture Forward head position;Protracted shoulders   Posture  Comments wide luis   Range of Motion (ROM)   ROM Comment not formally tested, very tight in Low back and hips   Strength   Strength Comments moved extrem against gravity   Bed Mobility   Bed Mobility Comments slow, painful   Transfer Skills   Transfer Comments painful sit to stand and to sit   Locomotion   Wheel Chair Mobility Comments n/a   Gait   Gait Comments wide luis.     Gait Special Tests   Gait Special Tests 25 FOOT TIMED WALK   Gait Special Tests 25 Foot Timed Walk   Seconds 15   Steps 20 Steps   Comments no AD   Balance   Balance Comments can stand feet nearly together EO 20 sec.    Sensory Examination   Sensory Perception other (describe)   Sensory Perception Comments R gr toe and sometimes foot are tingling   Muscle Tone   Muscle Tone Comments painful rom spine   Planned Therapy Interventions   Planned Therapy Interventions neuromuscular re-education;gait training;strengthening   Clinical Impression   Criteria for Skilled Therapeutic Interventions Met yes, treatment indicated   PT Diagnosis back pain affecting all adl's   Influenced by the following impairments weakness, pain, spinal changes   Functional limitations due to impairments impaired gait   Clinical Presentation Evolving/Changing   Clinical Presentation Rationale pain at constant 7 or + due to spinal issues, needing surgery, deconditioned, obesity   Clinical Decision Making (Complexity) Moderate complexity   Therapy Frequency other (see comments)   Predicted Duration of Therapy Intervention (days/wks) up to 6x in 60 days   Risk & Benefits of therapy have been explained Yes   Patient, Family & other staff in agreement with plan of care Yes   Clinical Impression Comments 55 yo w/ significant spinal changes and hip OA w/ pain affecting all mobility   Education Assessment   Preferred Learning Style Demonstration   Barriers to Learning Physical   GOALS   PT Eval Goals 1;2   Goal 1   Goal Identifier HEP   Goal Description pt to be indep w/ a HEP for  light strengthening   Target Date 10/28/21   Goal 2   Goal Identifier gait    Goal Description gait w/ cane 200ft indep on flat surfaces    Target Date 10/28/21   Total Evaluation Time   PT Eval, Moderate Complexity Minutes (52362) 32

## 2021-09-12 ENCOUNTER — HEALTH MAINTENANCE LETTER (OUTPATIENT)
Age: 56
End: 2021-09-12

## 2021-09-13 ENCOUNTER — ANCILLARY PROCEDURE (OUTPATIENT)
Dept: PHYSICAL MEDICINE AND REHAB | Facility: CLINIC | Age: 56
End: 2021-09-13
Attending: PHYSICIAN ASSISTANT
Payer: COMMERCIAL

## 2021-09-13 VITALS
BODY MASS INDEX: 46.27 KG/M2 | DIASTOLIC BLOOD PRESSURE: 79 MMHG | WEIGHT: 286.7 LBS | TEMPERATURE: 97.8 F | HEART RATE: 91 BPM | SYSTOLIC BLOOD PRESSURE: 138 MMHG | OXYGEN SATURATION: 94 %

## 2021-09-13 DIAGNOSIS — E66.9 DIABETES MELLITUS TYPE 2 IN OBESE: Primary | ICD-10-CM

## 2021-09-13 DIAGNOSIS — M16.0 PRIMARY OSTEOARTHRITIS OF BOTH HIPS: ICD-10-CM

## 2021-09-13 DIAGNOSIS — E11.69 DIABETES MELLITUS TYPE 2 IN OBESE: Primary | ICD-10-CM

## 2021-09-13 LAB — GLUCOSE SERPL-MCNC: 90 MG/DL (ref 70–99)

## 2021-09-13 PROCEDURE — 20611 DRAIN/INJ JOINT/BURSA W/US: CPT | Mod: 50 | Performed by: PAIN MEDICINE

## 2021-09-13 PROCEDURE — 82962 GLUCOSE BLOOD TEST: CPT | Performed by: PAIN MEDICINE

## 2021-09-13 RX ORDER — LIDOCAINE HYDROCHLORIDE 10 MG/ML
INJECTION, SOLUTION EPIDURAL; INFILTRATION; INTRACAUDAL; PERINEURAL
Status: COMPLETED | OUTPATIENT
Start: 2021-09-13 | End: 2021-09-13

## 2021-09-13 RX ORDER — ROPIVACAINE HYDROCHLORIDE 5 MG/ML
INJECTION, SOLUTION EPIDURAL; INFILTRATION; PERINEURAL
Status: COMPLETED | OUTPATIENT
Start: 2021-09-13 | End: 2021-09-13

## 2021-09-13 RX ORDER — METHYLPREDNISOLONE ACETATE 80 MG/ML
INJECTION, SUSPENSION INTRA-ARTICULAR; INTRALESIONAL; INTRAMUSCULAR; SOFT TISSUE
Status: COMPLETED | OUTPATIENT
Start: 2021-09-13 | End: 2021-09-13

## 2021-09-13 RX ADMIN — LIDOCAINE HYDROCHLORIDE 2 ML: 10 INJECTION, SOLUTION EPIDURAL; INFILTRATION; INTRACAUDAL; PERINEURAL at 14:09

## 2021-09-13 RX ADMIN — ROPIVACAINE HYDROCHLORIDE 9 ML: 5 INJECTION, SOLUTION EPIDURAL; INFILTRATION; PERINEURAL at 14:09

## 2021-09-13 RX ADMIN — METHYLPREDNISOLONE ACETATE 80 MG: 80 INJECTION, SUSPENSION INTRA-ARTICULAR; INTRALESIONAL; INTRAMUSCULAR; SOFT TISSUE at 14:08

## 2021-09-13 ASSESSMENT — PAIN SCALES - GENERAL: PAINLEVEL: SEVERE PAIN (7)

## 2021-09-13 NOTE — PATIENT INSTRUCTIONS
DISCHARGE INSTRUCTIONS    During office hours (8:00 a.m.- 4:00 p.m.) questions or concerns may be answered  by calling [facility] Navigation Nurses at  [phone].  Messages received after hours will be returned the following business day.      In the case of an emergency, please dial 911 or seek assistance at the nearest Emergency Room/Urgent Care facility.     All Patients:  ? You may experience an increase in your symptoms for the first 2 days (It may take anywhere between 2 days- 2 weeks for the steroid to have maximum effect).    ? You may use ice on the injection site, as frequently as 20 minutes each hour if needed.    ? You may take your pain medicine.    ? You may continue taking your regular medication.    ? You may shower. No swimming, tub bath or hot tub for 48 hours.  You may remove your bandaid/bandage as soon as you are home.    ? You may resume light activities, as tolerated.    ? Resume your usual diet as tolerated.    ? It is strongly advised that you do not drive for 1-3 hours post injection.    ? If you have had oral sedation:  Do not drive for 8 hours post injection.      ? If you have had IV sedation:  Do not drive for 24 hours post injection.  Do not operate hazardous machinery or make important personal/business decisions for 24 hours.    POSSIBLE STEROID SIDE EFFECTS (If steroid/cortisone was used for your procedure)    -If you experience these symptoms, it should only last for a short period      Swelling of the legs                Skin redness (flushing)       Mouth (oral) irritation     Blood sugar (glucose) levels              Sweats                     Mood changes    Headache    Weakened immune system for up to 14 days, which could increase the risk of brenda the COVID-19 virus and/or experiencing more severe symptoms of the disease, if exposed.         POSSIBLE PROCEDURE SIDE EFFECTS    -Call the Spine Center if you are concerned      Increased Pain             Increased  numbness/tingling        Nausea/Vomiting            Bruising/bleeding at site        Redness or swelling                                                Difficulty walking        Weakness            Fever greater than 100.5    *In the event of a severe headache after an epidural steroid injection that is relieved by lying down, please call the [facility] to speak with a clinical staff member*

## 2021-09-23 DIAGNOSIS — E78.5 HYPERLIPIDEMIA LDL GOAL <100: ICD-10-CM

## 2021-09-23 DIAGNOSIS — E03.9 HYPOTHYROIDISM, UNSPECIFIED TYPE: Primary | ICD-10-CM

## 2021-09-23 RX ORDER — LEVOTHYROXINE SODIUM 150 UG/1
TABLET ORAL
Qty: 90 TABLET | Refills: 3 | Status: SHIPPED | OUTPATIENT
Start: 2021-09-23 | End: 2021-10-05

## 2021-09-23 RX ORDER — LOVASTATIN 40 MG
TABLET ORAL
Qty: 90 TABLET | Refills: 3 | Status: SHIPPED | OUTPATIENT
Start: 2021-09-23 | End: 2022-07-12

## 2021-09-23 NOTE — TELEPHONE ENCOUNTER
"Routing refill request to provider for review/approval because:  Last annual physical over a year ago, do you want patient to follow up for in person visit?    Last Written Prescription Date:  10/7/2020 Both medications  Last Fill Quantity: 90,  # refills: 3   Last office visit provider:       Requested Prescriptions   Pending Prescriptions Disp Refills     lovastatin (MEVACOR) 40 MG tablet [Pharmacy Med Name: LOVASTATIN 40 MG TABLET] 90 tablet 3     Sig: TAKE 1 TABLET BY MOUTH EVERY DAY       Statins Protocol Passed - 9/23/2021 12:18 AM        Passed - LDL on file in past 12 months     Recent Labs   Lab Test 10/21/20  1010                Passed - No abnormal creatine kinase in past 12 months     No lab results found.             Passed - Recent (12 mo) or future (30 days) visit within the authorizing provider's specialty     Patient has had an office visit with the authorizing provider or a provider within the authorizing providers department within the previous 12 mos or has a future within next 30 days. See \"Patient Info\" tab in inbasket, or \"Choose Columns\" in Meds & Orders section of the refill encounter.              Passed - Medication is active on med list        Passed - Patient is age 18 or older        Passed - No active pregnancy on record        Passed - No positive pregnancy test in past 12 months           levothyroxine (SYNTHROID/LEVOTHROID) 150 MCG tablet [Pharmacy Med Name: LEVOTHYROXINE 150 MCG TABLET] 90 tablet 3     Sig: TAKE 1 TABLET (150 MCG TOTAL) BY MOUTH DAILY AT 6:00 AM.       Thyroid Protocol Passed - 9/23/2021 12:18 AM        Passed - Patient is 12 years or older        Passed - Recent (12 mo) or future (30 days) visit within the authorizing provider's specialty     Patient has had an office visit with the authorizing provider or a provider within the authorizing providers department within the previous 12 mos or has a future within next 30 days. See \"Patient Info\" tab in " "inbasket, or \"Choose Columns\" in Meds & Orders section of the refill encounter.              Passed - Medication is active on med list        Passed - Normal TSH on file in past 12 months     Recent Labs   Lab Test 10/21/20  1010   TSH 0.97              Passed - No active pregnancy on record     If patient is pregnant or has had a positive pregnancy test, please check TSH.          Passed - No positive pregnancy test in past 12 months     If patient is pregnant or has had a positive pregnancy test, please check TSH.               Chioma Espinosa RN 09/23/21 2:32 PM  "

## 2021-09-24 ENCOUNTER — OFFICE VISIT (OUTPATIENT)
Dept: PHYSICAL MEDICINE AND REHAB | Facility: CLINIC | Age: 56
End: 2021-09-24
Payer: COMMERCIAL

## 2021-09-24 VITALS
SYSTOLIC BLOOD PRESSURE: 138 MMHG | DIASTOLIC BLOOD PRESSURE: 72 MMHG | WEIGHT: 283.1 LBS | BODY MASS INDEX: 44.43 KG/M2 | HEIGHT: 67 IN | HEART RATE: 91 BPM

## 2021-09-24 DIAGNOSIS — M16.0 PRIMARY OSTEOARTHRITIS OF BOTH HIPS: ICD-10-CM

## 2021-09-24 DIAGNOSIS — M43.16 SPONDYLOLISTHESIS OF LUMBAR REGION: Primary | ICD-10-CM

## 2021-09-24 DIAGNOSIS — M54.16 LUMBAR RADICULAR PAIN: ICD-10-CM

## 2021-09-24 DIAGNOSIS — E66.01 MORBID OBESITY (H): ICD-10-CM

## 2021-09-24 PROCEDURE — 99214 OFFICE O/P EST MOD 30 MIN: CPT | Performed by: PHYSICIAN ASSISTANT

## 2021-09-24 ASSESSMENT — PAIN SCALES - GENERAL: PAINLEVEL: MODERATE PAIN (4)

## 2021-09-24 ASSESSMENT — MIFFLIN-ST. JEOR: SCORE: 1911.76

## 2021-09-24 NOTE — PATIENT INSTRUCTIONS
Talk about Lyrica dosing with your PCP.  You can also discuss weight loss medication options.    You will get a phone call to schedule with [place] for your hips.    Bilateral L5/S1 epidural steroid injections have been ordered today.      Please note that this injection uses cortisone.  The cortisone may somewhat weaken the immune system.  It is unknown how much the immune system is weakened.  It is unknown if it is weakened to the point that you may be more likely to get the COVID-19 virus, or if you do get the COVID-19 virus, if you would be sicker than you would have been if you had not had the cortisone injection.  If you do not wish to proceed with the injection, please let the nurse/physician know and do NOT schedule the injection.    Please note that since your immune system is weakened from the cortisone, having a flu vaccine/shot may be less effective if you have this vaccine within 2 weeks from your cortisone injection.  It is advised to wait 2 weeks after your cortisone injection to have the flu shot (or if you have the flu shot first, wait 2 weeks before you have the cortisone injection).    Please schedule this injection at least 1 week  from now to allow time for insurance prior authorization.  On the day of your injection, you cannot be sick or taking antibiotics.  If you become sick and are prescribed, please call the clinic so your injection can be rescheduled for once you have completed your antibiotics.  You will need to bring a  with you for your injection.   If you have any questions or concerns prior to your injection, please do not hesitate to call the nurse navigation line at [phone] or contact Samantha Abdi through [email or web address].

## 2021-09-24 NOTE — PROGRESS NOTES
Assessment:   Aby Giraldo is a 56 y.o. y.o. female with past medical history significant for morbid obesity, hypothyroidism, hyperlipidemia, type 2 diabetes mellitus,  who presents today for follow-up regarding chronic pain involving the low back, hips, legs.  Symptoms appear multifactorial.  Pain is thought to be related to osteoarthritis of the hips as well as lumbar spinal stenosis. My review of an MRI lumbar spine shows moderate to severe spinal stenosis at L4-5 related to a disc bulge and moderate facet arthropathy. She may also have some pain related to the lower lumbar facet arthropathy/mild SI joint degenerative change.    -Patient status post ultrasound-guided hip joint injections September 13, 2021 which provided slightly more than 50% relief on the right and slightly less than 50% relief of pain on the left.  -Patient is status post a bilateral L4-5 transforaminal epidural steroid injection December 2, 2020 which provided 80% relief of her back pain and significant improvement in the radicular pain down her leg (shooting pain into the great toe), but the relief only lasted several weeks.    -Patient saw Dr. Llanos.  He recommended a L3-S1 decompression and L4-S1 fusion with Dr. Llanos.  Insurance denied the surgery due to her BMI.  -Her insurance indicated hip replacement surgery would also be denied because of her BMI.  -Patient needs to get her BMI to less than 40.  She has had some success with weight loss with medications, but would like to pursue gastric bypass surgery.       Plan:     A shared decision making plan was used.  The patient's values and choices were respected.  The following represents what was discussed and decided upon by the physician assistant and the patient.      1.  DIAGNOSTIC TESTS: I reviewed the MRI lumbar spine and MRI sacroiliac joints.  No further diagnostic tests were ordered.    2.  PHYSICAL THERAPY: Patient tried to sessions physical therapy which she did not  tolerate due to increased pain with reasonable exercises.    3.  MEDICATIONS: No changes are made to the patient's medications.-Patient takes pregabalin 200 mg twice daily.  She states this is helping but she is having some cognitive side effects.  Recommended she discuss the dosing with her primary care provider.  Perhaps she could take a lower dose in the morning.  -Gabapentin was not helpful.  -Cyclobenzaprine was not helpful so she stopped taking it.  -Patient can continue Tylenol and ibuprofen as needed.    4.  INTERVENTIONS:    -In regards to the lower back pain, I recommended that we try bilateral L5-S1 transforaminal epidural steroid injections.  Hopefully this will be more effective than the bilateral L4-5 transforaminal epidural steroid injections.  Pain does follow a L5 pattern.  -We could also consider bilateral L3, L4, L5 medial branch blocks as a work-up for radiofrequency ablation for axial low back pain.  -If she failed the medial branch blocks, we could try bilateral sacroiliac joint injections.  -In regards to the hip pain, we can repeat the hip joint injections.  However, if they do not provide long-lasting relief of her pain we could consider coolief instead since she is not a candidate for hip replacement surgery due to her BMI.  -Second Covid vaccine was April 2021.    5.  PATIENT EDUCATION:   -Patient indicated she was going to see a weight loss specialist about gastric bypass surgery.  I think this is a good choice for her.  -Patient is in agreement the above plan.  All questions were answered.    6.  FOLLOW-UP: Patient will follow up with me 2 weeks after her bilateral L5-S1 transforaminal epidural steroid injections.  If she has questions or concerns in the meantime, she should not hesitate to call.    Subjective:     Aby Giraldo is a 56 y.o. female who presents today for follow-up regarding chronic pain involving the low back, hips, lower extremities.  Pain is thought to be  multifactorial.    Patient had bilateral hip joint injections under ultrasound guidance September 13, 2021.  Patient reports the injections provided slightly more than 50% relief of her pain on the right and slightly less than 50% relief of her pain on the left.  Patient has seen Dr. Llanos and he recommended a multilevel fusion but this was denied because of her BMI.    Patient complains of bilateral low back pain.  Pain spans across low back at the lumbosacral junction.  Pain radiates into the bilateral buttocks, lateral hips, and groin.  On the right she has shooting pain that radiates down her anterior shin into her foot affecting her toes, primarily her great toe.  She has intermittent numbness and tingling in the same distribution on the left.  She feels weak in both legs with walking.  She has loss of bladder control at night.  When she transitions to get up out of bed she leaks.  She rates her pain today as a 3 or 4 out of 10.  At its worst it is a 9 out of 10.  At its best it is a 7 out of 10.  Pain is aggravated with descending stairs on the left and rotating her left leg.  Walking makes her pain worse.  Pain is alleviated with sitting and sleeping.  She denies any new symptoms since she was last seen.    Patient tried 2 sessions of physical therapy but did not tolerate even simple exercises due to increased pain.  She has tried chiropractic treatment, ultrasound treatments, TENS unit, massage.  She is taking Lyrica 200 mg twice daily.  She states this is somewhat helpful but she is noticing some cognitive side effects.    Past medical history is reviewed.    Review of Systems:  Positive for numbness/tingling, weakness, loss of bladder control, headache.  Negative for loss of bowel control, inability to urinate,  pain much worse at night, trip/stumble/falls, difficulty swallowing, difficulty with hand skills, fevers, unintentional weight loss.     Objective:   CONSTITUTIONAL:  Vital signs as above.  No  acute distress.  The patient is well nourished and well groomed.  Patient is obese.  PSYCHIATRIC:  The patient is awake, alert, oriented to person, place and time.  The patient is answering questions appropriately with clear speech.  Normal affect.  HEENT: Normocephalic, atraumatic.  Sclera clear.    SKIN:  Skin over the face, posterior torso, bilateral upper and lower extremities is clean, dry, intact without rashes.    VASCULAR: No significant lower extremity edema.  MUSCULOSKELETAL:  Gait is Trendelenburg.  She is able to rise onto toes and heels bilaterally with support.  Tender to palpation bilateral lower lumbar paraspinous muscles.  The patient has 5/5 strength bilateral hip flexors, bilateral knee flexors/extensors, ankle dorsiflexors/plantar flexors, ankle evertors/invertors.  Internal greater than external rotation of both hips is restricted with reproduction of pain localizing to the groin on the right and groin and buttock on the left.  NEUROLOGICAL: 1+ patellar and Achilles reflexes which are symmetric bilaterally.  No ankle clonus bilaterally.  Diminished sensation right L5 dermatome.     RESULTS:    I reviewed the MRI lumbar spine from [facility] dated August 18, 2021.  At L3-4 there is a central disc protrusion and mild facet arthropathy with moderate spinal canal stenosis.  At L4-5 there is a shallow disc bulge and moderate facet arthropathy with moderate to severe spinal stenosis.  At L5-S1 there is a disc bulge with mild right and moderate left facet arthropathy causing moderate spinal canal stenosis and mild bilateral foraminal stenosis.    I reviewed the MRI of the sacroiliac joints from [facility] dated August 18, 2021.  This shows mild degenerative changes bilateral sacroiliac joints.  There are advanced degenerative changes bilateral hips, partially imaged.

## 2021-09-24 NOTE — LETTER
9/24/2021         RE: Aby Giraldo  [address] [facility] [address]        Dear Colleague,    Thank you for referring your patient, Aby Giraldo, to the [facility] [place]. Please see a copy of my visit note below.    Assessment:   Aby Giraldo is a 56 y.o. y.o. female with past medical history significant for morbid obesity, hypothyroidism, hyperlipidemia, type 2 diabetes mellitus,  who presents today for follow-up regarding chronic pain involving the low back, hips, legs.  Symptoms appear multifactorial.  Pain is thought to be related to osteoarthritis of the hips as well as lumbar spinal stenosis. My review of an MRI lumbar spine shows moderate to severe spinal stenosis at L4-5 related to a disc bulge and moderate facet arthropathy. She may also have some pain related to the lower lumbar facet arthropathy/mild SI joint degenerative change.    -Patient status post ultrasound-guided hip joint injections September 13, 2021 which provided slightly more than 50% relief on the right and slightly less than 50% relief of pain on the left.  -Patient is status post a bilateral L4-5 transforaminal epidural steroid injection December 2, 2020 which provided 80% relief of her back pain and significant improvement in the radicular pain down her leg (shooting pain into the great toe), but the relief only lasted several weeks.    -Patient saw Dr. Llanso.  He recommended a L3-S1 decompression and L4-S1 fusion with Dr. Llanos.  Insurance denied the surgery due to her BMI.  -Her insurance indicated hip replacement surgery would also be denied because of her BMI.  -Patient needs to get her BMI to less than 40.  She has had some success with weight loss with medications, but would like to pursue gastric bypass surgery.       Plan:     A shared decision making plan was used.  The patient's values and choices were respected.  The following represents what was discussed and decided upon by the  physician assistant and the patient.      1.  DIAGNOSTIC TESTS: I reviewed the MRI lumbar spine and MRI sacroiliac joints.  No further diagnostic tests were ordered.    2.  PHYSICAL THERAPY: Patient tried to sessions physical therapy which she did not tolerate due to increased pain with reasonable exercises.    3.  MEDICATIONS: No changes are made to the patient's medications.-Patient takes pregabalin 200 mg twice daily.  She states this is helping but she is having some cognitive side effects.  Recommended she discuss the dosing with her primary care provider.  Perhaps she could take a lower dose in the morning.  -Gabapentin was not helpful.  -Cyclobenzaprine was not helpful so she stopped taking it.  -Patient can continue Tylenol and ibuprofen as needed.    4.  INTERVENTIONS:    -In regards to the lower back pain, I recommended that we try bilateral L5-S1 transforaminal epidural steroid injections.  Hopefully this will be more effective than the bilateral L4-5 transforaminal epidural steroid injections.  Pain does follow a L5 pattern.  -We could also consider bilateral L3, L4, L5 medial branch blocks as a work-up for radiofrequency ablation for axial low back pain.  -If she failed the medial branch blocks, we could try bilateral sacroiliac joint injections.  -In regards to the hip pain, we can repeat the hip joint injections.  However, if they do not provide long-lasting relief of her pain we could consider coolief instead since she is not a candidate for hip replacement surgery due to her BMI.  -Second Covid vaccine was April 2021.    5.  PATIENT EDUCATION:   -Patient indicated she was going to see a weight loss specialist about gastric bypass surgery.  I think this is a good choice for her.  -Patient is in agreement the above plan.  All questions were answered.    6.  FOLLOW-UP: Patient will follow up with me 2 weeks after her bilateral L5-S1 transforaminal epidural steroid injections.  If she has questions or  concerns in the meantime, she should not hesitate to call.    Subjective:     Aby Giraldo is a 56 y.o. female who presents today for follow-up regarding chronic pain involving the low back, hips, lower extremities.  Pain is thought to be multifactorial.    Patient had bilateral hip joint injections under ultrasound guidance September 13, 2021.  Patient reports the injections provided slightly more than 50% relief of her pain on the right and slightly less than 50% relief of her pain on the left.  Patient has seen Dr. Llanos and he recommended a multilevel fusion but this was denied because of her BMI.    Patient complains of bilateral low back pain.  Pain spans across low back at the lumbosacral junction.  Pain radiates into the bilateral buttocks, lateral hips, and groin.  On the right she has shooting pain that radiates down her anterior shin into her foot affecting her toes, primarily her great toe.  She has intermittent numbness and tingling in the same distribution on the left.  She feels weak in both legs with walking.  She has loss of bladder control at night.  When she transitions to get up out of bed she leaks.  She rates her pain today as a 3 or 4 out of 10.  At its worst it is a 9 out of 10.  At its best it is a 7 out of 10.  Pain is aggravated with descending stairs on the left and rotating her left leg.  Walking makes her pain worse.  Pain is alleviated with sitting and sleeping.  She denies any new symptoms since she was last seen.    Patient tried 2 sessions of physical therapy but did not tolerate even simple exercises due to increased pain.  She has tried chiropractic treatment, ultrasound treatments, TENS unit, massage.  She is taking Lyrica 200 mg twice daily.  She states this is somewhat helpful but she is noticing some cognitive side effects.    Past medical history is reviewed.    Review of Systems:  Positive for numbness/tingling, weakness, loss of bladder control, headache.  Negative for  loss of bowel control, inability to urinate,  pain much worse at night, trip/stumble/falls, difficulty swallowing, difficulty with hand skills, fevers, unintentional weight loss.     Objective:   CONSTITUTIONAL:  Vital signs as above.  No acute distress.  The patient is well nourished and well groomed.  Patient is obese.  PSYCHIATRIC:  The patient is awake, alert, oriented to person, place and time.  The patient is answering questions appropriately with clear speech.  Normal affect.  HEENT: Normocephalic, atraumatic.  Sclera clear.    SKIN:  Skin over the face, posterior torso, bilateral upper and lower extremities is clean, dry, intact without rashes.    VASCULAR: No significant lower extremity edema.  MUSCULOSKELETAL:  Gait is Trendelenburg.  She is able to rise onto toes and heels bilaterally with support.  Tender to palpation bilateral lower lumbar paraspinous muscles.  The patient has 5/5 strength bilateral hip flexors, bilateral knee flexors/extensors, ankle dorsiflexors/plantar flexors, ankle evertors/invertors.  Internal greater than external rotation of both hips is restricted with reproduction of pain localizing to the groin on the right and groin and buttock on the left.  NEUROLOGICAL: 1+ patellar and Achilles reflexes which are symmetric bilaterally.  No ankle clonus bilaterally.  Diminished sensation right L5 dermatome.     RESULTS:    I reviewed the MRI lumbar spine from [facility] dated August 18, 2021.  At L3-4 there is a central disc protrusion and mild facet arthropathy with moderate spinal canal stenosis.  At L4-5 there is a shallow disc bulge and moderate facet arthropathy with moderate to severe spinal stenosis.  At L5-S1 there is a disc bulge with mild right and moderate left facet arthropathy causing moderate spinal canal stenosis and mild bilateral foraminal stenosis.    I reviewed the MRI of the sacroiliac joints from [facility] dated August 18, 2021.  This shows mild degenerative changes  bilateral sacroiliac joints.  There are advanced degenerative changes bilateral hips, partially imaged.      Again, thank you for allowing me to participate in the care of your patient.        Sincerely,        Samantha Abdi PA-C

## 2021-10-04 ENCOUNTER — ANCILLARY PROCEDURE (OUTPATIENT)
Dept: PHYSICAL MEDICINE AND REHAB | Facility: CLINIC | Age: 56
End: 2021-10-04
Attending: PHYSICIAN ASSISTANT
Payer: COMMERCIAL

## 2021-10-04 ENCOUNTER — OFFICE VISIT (OUTPATIENT)
Dept: FAMILY MEDICINE | Facility: CLINIC | Age: 56
End: 2021-10-04
Payer: COMMERCIAL

## 2021-10-04 VITALS
OXYGEN SATURATION: 95 % | SYSTOLIC BLOOD PRESSURE: 110 MMHG | HEIGHT: 67 IN | HEART RATE: 86 BPM | DIASTOLIC BLOOD PRESSURE: 68 MMHG | WEIGHT: 285 LBS | TEMPERATURE: 98.3 F | BODY MASS INDEX: 44.73 KG/M2

## 2021-10-04 VITALS
WEIGHT: 282.13 LBS | TEMPERATURE: 96.1 F | HEART RATE: 88 BPM | BODY MASS INDEX: 44.28 KG/M2 | RESPIRATION RATE: 16 BRPM | DIASTOLIC BLOOD PRESSURE: 83 MMHG | SYSTOLIC BLOOD PRESSURE: 134 MMHG | HEIGHT: 67 IN

## 2021-10-04 DIAGNOSIS — Z12.11 SCREENING FOR COLON CANCER: ICD-10-CM

## 2021-10-04 DIAGNOSIS — E11.69 TYPE 2 DIABETES MELLITUS WITH OTHER SPECIFIED COMPLICATION, UNSPECIFIED WHETHER LONG TERM INSULIN USE (H): Primary | ICD-10-CM

## 2021-10-04 DIAGNOSIS — Z12.11 SCREEN FOR COLON CANCER: ICD-10-CM

## 2021-10-04 DIAGNOSIS — E78.5 HYPERLIPIDEMIA LDL GOAL <100: ICD-10-CM

## 2021-10-04 DIAGNOSIS — M48.061 SPINAL STENOSIS OF LUMBAR REGION, UNSPECIFIED WHETHER NEUROGENIC CLAUDICATION PRESENT: ICD-10-CM

## 2021-10-04 DIAGNOSIS — M43.16 SPONDYLOLISTHESIS OF LUMBAR REGION: ICD-10-CM

## 2021-10-04 DIAGNOSIS — Z11.4 SCREENING FOR HIV (HUMAN IMMUNODEFICIENCY VIRUS): ICD-10-CM

## 2021-10-04 DIAGNOSIS — M51.369 DDD (DEGENERATIVE DISC DISEASE), LUMBAR: Primary | ICD-10-CM

## 2021-10-04 DIAGNOSIS — M54.16 LUMBAR RADICULAR PAIN: ICD-10-CM

## 2021-10-04 DIAGNOSIS — Z13.220 SCREENING FOR HYPERLIPIDEMIA: ICD-10-CM

## 2021-10-04 DIAGNOSIS — E11.69 DIABETES MELLITUS TYPE 2 IN OBESE: ICD-10-CM

## 2021-10-04 DIAGNOSIS — E66.9 DIABETES MELLITUS TYPE 2 IN OBESE: ICD-10-CM

## 2021-10-04 DIAGNOSIS — E66.01 MORBID OBESITY (H): ICD-10-CM

## 2021-10-04 LAB
ALBUMIN SERPL-MCNC: 3.7 G/DL (ref 3.5–5)
ALP SERPL-CCNC: 91 U/L (ref 45–120)
ALT SERPL W P-5'-P-CCNC: 38 U/L (ref 0–45)
ANION GAP SERPL CALCULATED.3IONS-SCNC: 12 MMOL/L (ref 5–18)
AST SERPL W P-5'-P-CCNC: 26 U/L (ref 0–40)
BILIRUB SERPL-MCNC: 0.5 MG/DL (ref 0–1)
BUN SERPL-MCNC: 11 MG/DL (ref 8–22)
CALCIUM SERPL-MCNC: 9.7 MG/DL (ref 8.5–10.5)
CHLORIDE BLD-SCNC: 108 MMOL/L (ref 98–107)
CHOLEST SERPL-MCNC: 168 MG/DL
CO2 SERPL-SCNC: 21 MMOL/L (ref 22–31)
CREAT SERPL-MCNC: 0.88 MG/DL (ref 0.6–1.1)
CREAT UR-MCNC: 262 MG/DL
ERYTHROCYTE [DISTWIDTH] IN BLOOD BY AUTOMATED COUNT: 12.2 % (ref 10–15)
FASTING STATUS PATIENT QL REPORTED: YES
GFR SERPL CREATININE-BSD FRML MDRD: 74 ML/MIN/1.73M2
GLUCOSE BLD-MCNC: 95 MG/DL (ref 70–125)
GLUCOSE SERPL-MCNC: 109 MG/DL (ref 70–99)
HCT VFR BLD AUTO: 44.9 % (ref 35–47)
HDLC SERPL-MCNC: 48 MG/DL
HGB BLD-MCNC: 14.2 G/DL (ref 11.7–15.7)
LDLC SERPL CALC-MCNC: 95 MG/DL
MCH RBC QN AUTO: 28.7 PG (ref 26.5–33)
MCHC RBC AUTO-ENTMCNC: 31.6 G/DL (ref 31.5–36.5)
MCV RBC AUTO: 91 FL (ref 78–100)
MICROALBUMIN UR-MCNC: 1.67 MG/DL (ref 0–1.99)
MICROALBUMIN/CREAT UR: 6.4 MG/G CR
PLATELET # BLD AUTO: 267 10E3/UL (ref 150–450)
POTASSIUM BLD-SCNC: 4.6 MMOL/L (ref 3.5–5)
PROT SERPL-MCNC: 7.2 G/DL (ref 6–8)
RBC # BLD AUTO: 4.95 10E6/UL (ref 3.8–5.2)
SODIUM SERPL-SCNC: 141 MMOL/L (ref 136–145)
T4 FREE SERPL-MCNC: 1.59 NG/DL (ref 0.7–1.8)
TRIGL SERPL-MCNC: 127 MG/DL
TSH SERPL DL<=0.005 MIU/L-ACNC: 0.09 UIU/ML (ref 0.3–5)
WBC # BLD AUTO: 6.5 10E3/UL (ref 4–11)

## 2021-10-04 PROCEDURE — 80061 LIPID PANEL: CPT | Performed by: FAMILY MEDICINE

## 2021-10-04 PROCEDURE — 36415 COLL VENOUS BLD VENIPUNCTURE: CPT | Performed by: FAMILY MEDICINE

## 2021-10-04 PROCEDURE — 80053 COMPREHEN METABOLIC PANEL: CPT | Performed by: FAMILY MEDICINE

## 2021-10-04 PROCEDURE — 99213 OFFICE O/P EST LOW 20 MIN: CPT | Mod: 25 | Performed by: FAMILY MEDICINE

## 2021-10-04 PROCEDURE — 82306 VITAMIN D 25 HYDROXY: CPT | Performed by: FAMILY MEDICINE

## 2021-10-04 PROCEDURE — 82962 GLUCOSE BLOOD TEST: CPT | Performed by: PAIN MEDICINE

## 2021-10-04 PROCEDURE — 82043 UR ALBUMIN QUANTITATIVE: CPT | Performed by: FAMILY MEDICINE

## 2021-10-04 PROCEDURE — 85027 COMPLETE CBC AUTOMATED: CPT | Performed by: FAMILY MEDICINE

## 2021-10-04 PROCEDURE — 99207 PR DROP WITH A PROCEDURE: CPT | Mod: 25 | Performed by: PAIN MEDICINE

## 2021-10-04 PROCEDURE — 64483 NJX AA&/STRD TFRM EPI L/S 1: CPT | Mod: 50 | Performed by: PAIN MEDICINE

## 2021-10-04 PROCEDURE — 99396 PREV VISIT EST AGE 40-64: CPT | Performed by: FAMILY MEDICINE

## 2021-10-04 PROCEDURE — 84443 ASSAY THYROID STIM HORMONE: CPT | Performed by: FAMILY MEDICINE

## 2021-10-04 PROCEDURE — 84439 ASSAY OF FREE THYROXINE: CPT | Performed by: FAMILY MEDICINE

## 2021-10-04 RX ORDER — CHOLECALCIFEROL (VITAMIN D3) 50 MCG
1 TABLET ORAL DAILY
COMMUNITY

## 2021-10-04 RX ORDER — LIDOCAINE HYDROCHLORIDE 10 MG/ML
INJECTION, SOLUTION EPIDURAL; INFILTRATION; INTRACAUDAL; PERINEURAL
Status: COMPLETED | OUTPATIENT
Start: 2021-10-04 | End: 2021-10-04

## 2021-10-04 RX ORDER — DEXAMETHASONE SODIUM PHOSPHATE 10 MG/ML
INJECTION, SOLUTION INTRAMUSCULAR; INTRAVENOUS
Status: COMPLETED | OUTPATIENT
Start: 2021-10-04 | End: 2021-10-04

## 2021-10-04 RX ADMIN — DEXAMETHASONE SODIUM PHOSPHATE 20 MG: 10 INJECTION, SOLUTION INTRAMUSCULAR; INTRAVENOUS at 14:59

## 2021-10-04 RX ADMIN — LIDOCAINE HYDROCHLORIDE 1 ML: 10 INJECTION, SOLUTION EPIDURAL; INFILTRATION; INTRACAUDAL; PERINEURAL at 14:58

## 2021-10-04 ASSESSMENT — ANXIETY QUESTIONNAIRES
5. BEING SO RESTLESS THAT IT IS HARD TO SIT STILL: SEVERAL DAYS
1. FEELING NERVOUS, ANXIOUS, OR ON EDGE: MORE THAN HALF THE DAYS
6. BECOMING EASILY ANNOYED OR IRRITABLE: SEVERAL DAYS
2. NOT BEING ABLE TO STOP OR CONTROL WORRYING: MORE THAN HALF THE DAYS
4. TROUBLE RELAXING: SEVERAL DAYS
3. WORRYING TOO MUCH ABOUT DIFFERENT THINGS: MORE THAN HALF THE DAYS
7. FEELING AFRAID AS IF SOMETHING AWFUL MIGHT HAPPEN: SEVERAL DAYS
IF YOU CHECKED OFF ANY PROBLEMS ON THIS QUESTIONNAIRE, HOW DIFFICULT HAVE THESE PROBLEMS MADE IT FOR YOU TO DO YOUR WORK, TAKE CARE OF THINGS AT HOME, OR GET ALONG WITH OTHER PEOPLE: SOMEWHAT DIFFICULT
GAD7 TOTAL SCORE: 10

## 2021-10-04 ASSESSMENT — MIFFLIN-ST. JEOR
SCORE: 1920.38
SCORE: 1907.34

## 2021-10-04 ASSESSMENT — PAIN SCALES - GENERAL
PAINLEVEL: SEVERE PAIN (7)
PAINLEVEL: SEVERE PAIN (7)

## 2021-10-04 ASSESSMENT — PATIENT HEALTH QUESTIONNAIRE - PHQ9: SUM OF ALL RESPONSES TO PHQ QUESTIONS 1-9: 6

## 2021-10-04 NOTE — LETTER
Aby C Enzo  1965      10-4-21        To whom it may concern:    This patient has been diagnosed with lumbar spinal stenosis, lumbar spondylolisthesis and lumbar degenerative disc disease.  She is in severe pain.  She does have loss of bladder function.  She does have numbness and tingling in the right lower leg.  She has seen the spine surgeon who has recommended spine surgery.  This is a letter of medical necessity for her to have the surgery and for insurance to cover this surgery.  Her symptoms may get worse and become permanent if she is not able to have surgery soon.    She is working on diet and exercise as able with her physical impairments.  She has lost 22 pounds.    It is my medical opinion that this surgery is a medical necessity.    Please feel free to contact me with any questions or concerns.        Sincerely,        Ellen Kelsey MD

## 2021-10-04 NOTE — LETTER
10/4/2021         RE: Aby Giraldo  [address] [facility] [address]        Dear Colleague,    Thank you for referring your patient, Aby Giraldo, to the [facility] [place]. Please see a copy of my visit note below.    Patient was here for an injection today.        Again, thank you for allowing me to participate in the care of your patient.        Sincerely,        Duong Vora, DO

## 2021-10-04 NOTE — PATIENT INSTRUCTIONS
Dr. Kelsey will write a letter of medical necessity of back surgery.    Please ask you surgeon to write a letter of support / necessity as well.    Eye exam due Dec. 2021.    Phone number to call to set up Covid vaccine is [phone].  You can get that at any [facility] pharmacy or the number above to set it up at one of our clinics.  You can have that October 17 or after.    You are due for a flu shot and a tetanus shot.  I like to do the Tdap that has whooping cough in it as well.  You can set a nurse appointment for those or get those at a pharmacy when you are cleared to do that after your cortisone injection of your spine.    With having diabetes you should have the 23 steroid ammonia shot.  That also can be done as a nurse visit when cleared from your spine clinic after steroid injection or at a pharmacy.    Please set a 40-minute appointment for diabetic check in 6 months.      Health Maintenance   Topic Date Due     DIABETIC FOOT EXAM  Today     ANNUAL REVIEW OF  ORDERS  Today     EYE EXAM  Due in Dec.     Pneumococcal Vaccine: Pediatrics (0 to 5 Years) and At-Risk Patients (6 to 64 Years) (1 of 2 - PPSV23) Due anytime     COLORECTAL CANCER SCREENING   ordered Cologuard and this will be due every 3 years     HIV SCREENING  NA     ZOSTER IMMUNIZATION (1 of 2) If you are interested in the new shingles shot, Shingrix, please check with insurance for coverage either in clinic or at a pharmacy. It is a 2 shot series 2-6 months apart.      DTAP/TDAP/TD IMMUNIZATION (2 - Td or Tdap) 07/30/2020, due     PREVENTIVE CARE VISIT  Today     MICROALBUMIN  Today     A1C  Today     INFLUENZA VACCINE (1) Due     BMP  Today     LIPID  Today     MAMMO SCREENING  Due Aug. 2022     PHQ-9  Today     HPV TEST  2023     ADVANCE CARE PLANNING  We would like a copy please     PAP  2023     HEPATITIS C SCREENING  Completed     HEPATITIS B IMMUNIZATION  Completed     COVID-19 Vaccine  Completed     IPV IMMUNIZATION  Aged  Out     MENINGITIS IMMUNIZATION  Aged Out

## 2021-10-04 NOTE — PROGRESS NOTES
SUBJECTIVE:   CC: Aby Giraldo is an 56 year old woman who presents for preventive health visit.       Patient has been advised of split billing requirements and indicates understanding: Yes  Healthy Habits:     Getting at least 3 servings of Calcium per day:  NO    Bi-annual eye exam:  Yes    Dental care twice a year:  Yes    Sleep apnea or symptoms of sleep apnea:  None    Diet:  Regular (no restrictions)    Frequency of exercise:  2-3 days/week    Duration of exercise:  Less than 15 minutes    Taking medications regularly:  No    Medication side effects:  None    PHQ-2 Total Score: 2    Additional concerns today:  No      HPI:  Spine: Patient has been diagnosed with spinal stenosis of the lumbar region, spondylolisthesis and degenerative disc disease.  She is in a lot of pain.  Following with [facility].  Surgery has been medically recommended but her insurance will not pay for surgey since BMI over 40.  Cortisone planned today with spine clinic.  She has lost over 20 pounds with healthy diet exercise and Ozempic.  Has tingling of right lateral lower leg for along time. No weakness of legs.  When getting up from bed, will have leakage of urine.  On Lyrica and side effect ofbeing dizzy but helpful.    Severe OA:  Hip pain; Injection in hips 2 weeks ago and helped some.  Seeing Dr. Jurado at [facility] Nov 1.    Obesity:  Has lost 22 lbs.  Saw bariatric group.  Bariatric surgery not covered by insurance.  They did put her on Ozempic.  [facility] will not do surgery as self pay.  Seeing [facility] and they will do self pay baraiatric surgery for $[id number].  On hold now with Covid and holding off on elective surgeries.    Diabetes:  Last BS 90.  No hyoglycieminic symptoms.  Chest pain or shortness of breath.  She does have numbness and tingling in her feet because of spinal stenosis.  Recently changed to Metformin XR from regular Metformin and GI upset is less.  Eye exam Dec. 2020.      Depression:  On Lexapro and  seeing a therapist at [facility].          Health Maintenance   Topic Date Due     DIABETIC FOOT EXAM  Today     ANNUAL REVIEW OF HM ORDERS  Today     EYE EXAM  Due in Dec.     Pneumococcal Vaccine: Pediatrics (0 to 5 Years) and At-Risk Patients (6 to 64 Years) (1 of 2 - PPSV23) Due anytime     COLORECTAL CANCER SCREENING   ordered Cologuard and this will be due every 3 years     HIV SCREENING  NA     ZOSTER IMMUNIZATION (1 of 2) If you are interested in the new shingles shot, Shingrix, please check with insurance for coverage either in clinic or at a pharmacy. It is a 2 shot series 2-6 months apart.      DTAP/TDAP/TD IMMUNIZATION (2 - Td or Tdap) 07/30/2020, due     PREVENTIVE CARE VISIT  Today     MICROALBUMIN  Today     A1C  Today     INFLUENZA VACCINE (1) Due     BMP  Today     LIPID  Today     MAMMO SCREENING  Due Aug. 2022     PHQ-9  Today     HPV TEST  2023     ADVANCE CARE PLANNING  We would like a copy please     PAP  2023     HEPATITIS C SCREENING  Completed     HEPATITIS B IMMUNIZATION  Completed     COVID-19 Vaccine  Completed     IPV IMMUNIZATION  Aged Out     MENINGITIS IMMUNIZATION  Aged Out                 Today's PHQ-2 Score:   PHQ-2 ( 1999 Pfizer) 10/4/2021   Q1: Little interest or pleasure in doing things 1   Q2: Feeling down, depressed or hopeless 1   PHQ-2 Score 2   Q1: Little interest or pleasure in doing things Several days   Q2: Feeling down, depressed or hopeless Several days   PHQ-2 Score 2       Abuse: Current or Past (Physical, Sexual or Emotional) - No  Do you feel safe in your environment? Yes        Social History     Tobacco Use     Smoking status: Never Smoker     Smokeless tobacco: Never Used   Substance Use Topics     Alcohol use: Yes     Alcohol/week: 1.0 - 3.0 standard drinks         Alcohol Use 10/4/2021   Prescreen: >3 drinks/day or >7 drinks/week? No       Reviewed orders with patient.  Reviewed health maintenance and updated orders accordingly - Yes  Lab work is in  process    Breast Cancer Screening:  Any new diagnosis of family breast, ovarian, or bowel cancer? No    FHS-7:   Breast CA Risk Assessment (FHS-7) 8/18/2021   Did any of your first-degree relatives have breast or ovarian cancer? No   Did any of your relatives have bilateral breast cancer? No   Did any man in your family have breast cancer? No   Did any woman in your family have breast and ovarian cancer? No   Did any woman in your family have breast cancer before age 50 y? No   Do you have 2 or more relatives with breast and/or ovarian cancer? No   Do you have 2 or more relatives with breast and/or bowel cancer? No       Mammogram Screening: Recommended annual mammography  Pertinent mammograms are reviewed under the imaging tab.    History of abnormal Pap smear: NO - age 30- 65 PAP every 3 years recommended  PAP / HPV Latest Ref Rng & Units 8/8/2019   PAP Negative for squamous intraepithelial lesion or malignancy. Negative for squamous intraepithelial lesion or malignancy  Electronically signed by Jocelynn Solis CT (ASCP) on 8/15/2019 at  8:58 AM     HPV16 NEG Negative   HPV18 NEG Negative   HRHPV NEG Negative     Reviewed and updated as needed this visit by clinical staff  Tobacco  Allergies  Meds              Reviewed and updated as needed this visit by Provider                    Review of Systems  CONSTITUTIONAL: NEGATIVE for fever, chills, change in weight  INTEGUMENTARY/SKIN: NEGATIVE for worrisome rashes, moles or lesions  EYES: NEGATIVE for vision changes or irritation  ENT: NEGATIVE for ear, mouth and throat problems  RESP: NEGATIVE for significant cough or SOB  BREAST: NEGATIVE for masses, tenderness or discharge  CV: NEGATIVE for chest pain, palpitations or peripheral edema  GI: NEGATIVE for nausea, abdominal pain, heartburn, or change in bowel habits  : NEGATIVE for unusual urinary or vaginal symptoms. No vaginal bleeding.  PSYCHIATRIC: NEGATIVE for changes in mood or affect      OBJECTIVE:  "  /83 (BP Location: Left arm, Patient Position: Sitting, Cuff Size: Adult Regular)   Pulse 88   Temp (!) 96.1  F (35.6  C) (Oral)   Resp 16   Ht 1.702 m (5' 7\")   Wt 128 kg (282 lb 2 oz)   BMI 44.19 kg/m    Physical Exam  GENERAL: healthy, alert and no distress  EYES: Eyes grossly normal to inspection, PERRL and conjunctivae and sclerae normal  HENT: ear canals and TM's normal, nose and mouth without ulcers or lesions  NECK: no adenopathy, no asymmetry, masses, or scars and thyroid normal to palpation  RESP: lungs clear to auscultation - no rales, rhonchi or wheezes  BREAST: normal without masses, tenderness or nipple discharge and no palpable axillary masses or adenopathy  CV: regular rate and rhythm, normal S1 S2, no S3 or S4, no murmur, click or rub, no peripheral edema and peripheral pulses strong  ABDOMEN: soft, nontender, no hepatosplenomegaly, no masses and bowel sounds normal  MS: extremities normal- no gross deformities noted  SKIN: no suspicious lesions or rashes  NEURO: Deep tendon reflex 2+ left patella, 1+ right patella, positive straight leg raise on the right  PSYCH: mentation appears normal, affect normal/bright    Diagnostic Test Results:  Labs reviewed in Epic    ASSESSMENT/PLAN:       ICD-10-CM    1. DDD (degenerative disc disease), lumbar  M51.36    2. Screen for colon cancer  Z12.11    3. Screening for HIV (human immunodeficiency virus)  Z11.4    4. Screening for hyperlipidemia  Z13.220    5. Spinal stenosis of lumbar region, unspecified whether neurogenic claudication present  M48.061    6. Spondylolisthesis of lumbar region  M43.16    7. Hyperlipidemia LDL goal <100  E78.5    8. Diabetes mellitus type 2 in obese (H)  E11.69 Albumin Random Urine Quantitative with Creat Ratio    E66.9 CBC with platelets     Comprehensive metabolic panel     Lipid Profile     Albumin Random Urine Quantitative with Creat Ratio     CBC with platelets     Comprehensive metabolic panel     Lipid Profile " "  9. Morbid Obesity  E66.01 TSH with free T4 reflex     Vitamin D Deficiency     TSH with free T4 reflex     Vitamin D Deficiency   10. Screening for colon cancer  Z12.11 [employer]EL[employer]ET[employer])     Dr. Kelsey will write a letter of medical necessity of back surgery.    Please ask you surgeon to write a letter of support / necessity as well.    Eye exam due Dec. 2021.    Phone number to call to set up Covid vaccine is [phone].  You can get that at any [facility] pharmacy or the number above to set it up at one of our clinics.  You can have that October 17 or after.    You are due for a flu shot and a tetanus shot.  I like to do the Tdap that has whooping cough in it as well.  You can set a nurse appointment for those or get those at a pharmacy when you are cleared to do that after your cortisone injection of your spine.    With having diabetes you should have the 23 steroid ammonia shot.  That also can be done as a nurse visit when cleared from your spine clinic after steroid injection or at a pharmacy.    Please set a 40-minute appointment for diabetic check in 6 months.    Patient has been advised of split billing requirements and indicates understanding: Yes  COUNSELING:  Reviewed preventive health counseling, as reflected in patient instructions       Regular exercise       Healthy diet/nutrition    Estimated body mass index is 44.19 kg/m  as calculated from the following:    Height as of this encounter: 1.702 m (5' 7\").    Weight as of this encounter: 128 kg (282 lb 2 oz).    Weight management plan: Discussed healthy diet and exercise guidelines    She reports that she has never smoked. She has never used smokeless tobacco.      Counseling Resources:  ATP IV Guidelines  Pooled Cohorts Equation Calculator  Breast Cancer Risk Calculator  BRCA-Related Cancer Risk Assessment: FHS-7 Tool  FRAX Risk Assessment  ICSI Preventive Guidelines  Dietary Guidelines for Americans, 2010  USDA's MyPlate  ASA " Prophylaxis  Lung CA Screening    Ellen Kelsey MD  [facility]

## 2021-10-04 NOTE — PATIENT INSTRUCTIONS
DISCHARGE INSTRUCTIONS    During office hours (8:00 a.m.- 4:00 p.m.) questions or concerns may be answered  by calling Spine Center Navigation Nurses at  [phone].  Messages received after hours will be returned the following business day.      In the case of an emergency, please dial 911 or seek assistance at the nearest Emergency Room/Urgent Care facility.     All Patients:  ? You may experience an increase in your symptoms for the first 2 days (It may take anywhere between 2 days- 2 weeks for the steroid to have maximum effect).    ? You may use ice on the injection site, as frequently as 20 minutes each hour if needed.    ? You may take your pain medicine.    ? You may continue taking your regular medication.    ? You may shower. No swimming, tub bath or hot tub for 48 hours.  You may remove your bandaid/bandage as soon as you are home.    ? You may resume light activities, as tolerated.    ? Resume your usual diet as tolerated.    ? It is strongly advised that you do not drive for 1-3 hours post injection.    ? If you have had oral sedation:  Do not drive for 8 hours post injection.      ? If you have had IV sedation:  Do not drive for 24 hours post injection.  Do not operate hazardous machinery or make important personal/business decisions for 24 hours.    POSSIBLE STEROID SIDE EFFECTS (If steroid/cortisone was used for your procedure)    -If you experience these symptoms, it should only last for a short period      Swelling of the legs                Skin redness (flushing)       Mouth (oral) irritation     Blood sugar (glucose) levels              Sweats                     Mood changes    Headache    Weakened immune system for up to 14 days, which could increase the risk of brenda the COVID-19 virus and/or experiencing more severe symptoms of the disease, if exposed.         POSSIBLE PROCEDURE SIDE EFFECTS    -Call the Spine Center if you are concerned      Increased Pain             Increased  numbness/tingling        Nausea/Vomiting            Bruising/bleeding at site        Redness or swelling                                                Difficulty walking        Weakness            Fever greater than 100.5    *In the event of a severe headache after an epidural steroid injection that is relieved by lying down, please call the [facility] to speak with a clinical staff member*

## 2021-10-05 ENCOUNTER — MYC MEDICAL ADVICE (OUTPATIENT)
Dept: FAMILY MEDICINE | Facility: CLINIC | Age: 56
End: 2021-10-05

## 2021-10-05 DIAGNOSIS — E03.9 HYPOTHYROIDISM, UNSPECIFIED TYPE: Primary | ICD-10-CM

## 2021-10-05 LAB — DEPRECATED CALCIDIOL+CALCIFEROL SERPL-MC: 38 UG/L (ref 30–80)

## 2021-10-05 RX ORDER — LEVOTHYROXINE SODIUM 137 UG/1
137 TABLET ORAL DAILY
Qty: 90 TABLET | Refills: 3 | Status: SHIPPED | OUTPATIENT
Start: 2021-10-05 | End: 2021-11-24

## 2021-10-21 ENCOUNTER — TRANSFERRED RECORDS (OUTPATIENT)
Dept: HEALTH INFORMATION MANAGEMENT | Facility: CLINIC | Age: 56
End: 2021-10-21
Payer: COMMERCIAL

## 2021-10-21 LAB — COLOGUARD-ABSTRACT: NEGATIVE

## 2021-11-01 ENCOUNTER — TRANSFERRED RECORDS (OUTPATIENT)
Dept: HEALTH INFORMATION MANAGEMENT | Facility: CLINIC | Age: 56
End: 2021-11-01
Payer: COMMERCIAL

## 2021-11-08 ENCOUNTER — TRANSFERRED RECORDS (OUTPATIENT)
Dept: HEALTH INFORMATION MANAGEMENT | Facility: CLINIC | Age: 56
End: 2021-11-08
Payer: COMMERCIAL

## 2021-11-12 ENCOUNTER — MYC MEDICAL ADVICE (OUTPATIENT)
Dept: FAMILY MEDICINE | Facility: CLINIC | Age: 56
End: 2021-11-12
Payer: COMMERCIAL

## 2021-11-12 DIAGNOSIS — Z01.818 PREOP GENERAL PHYSICAL EXAM: Primary | ICD-10-CM

## 2021-11-15 NOTE — TELEPHONE ENCOUNTER
The only 40 min spot you have is 12/8 which is the day before her surgery. Do you want to work her in elsewhere? Or have her see a partner     Addendum:  I am fine with a 40-minute appointment on 12-8.    Per her chart there is no history of heart disease but please ask if she is having any chest pain or pressure on exertion or any shortness of breath on exertion and I can get a cardiac work-up going prior to her preop.    Also I will place a Covid swab order to be done 3 days before her surgery.  She should get a call later to set thei up.  Ellen Kelsey MD

## 2021-11-23 ENCOUNTER — OFFICE VISIT (OUTPATIENT)
Dept: FAMILY MEDICINE | Facility: CLINIC | Age: 56
End: 2021-11-23
Payer: COMMERCIAL

## 2021-11-23 VITALS
SYSTOLIC BLOOD PRESSURE: 117 MMHG | WEIGHT: 277 LBS | HEART RATE: 90 BPM | DIASTOLIC BLOOD PRESSURE: 71 MMHG | HEIGHT: 67 IN | BODY MASS INDEX: 43.47 KG/M2

## 2021-11-23 DIAGNOSIS — E78.5 HYPERLIPIDEMIA LDL GOAL <100: ICD-10-CM

## 2021-11-23 DIAGNOSIS — Z01.818 PREOP GENERAL PHYSICAL EXAM: Primary | ICD-10-CM

## 2021-11-23 DIAGNOSIS — E11.69 DIABETES MELLITUS TYPE 2 IN OBESE: ICD-10-CM

## 2021-11-23 DIAGNOSIS — M16.12 PRIMARY OSTEOARTHRITIS OF LEFT HIP: ICD-10-CM

## 2021-11-23 DIAGNOSIS — Z23 HIGH PRIORITY FOR 2019-NCOV VACCINE: ICD-10-CM

## 2021-11-23 DIAGNOSIS — E66.01 MORBID OBESITY (H): ICD-10-CM

## 2021-11-23 DIAGNOSIS — E03.9 HYPOTHYROIDISM, UNSPECIFIED TYPE: ICD-10-CM

## 2021-11-23 DIAGNOSIS — E66.9 DIABETES MELLITUS TYPE 2 IN OBESE: ICD-10-CM

## 2021-11-23 LAB
ANION GAP SERPL CALCULATED.3IONS-SCNC: 8 MMOL/L (ref 5–18)
BUN SERPL-MCNC: 15 MG/DL (ref 8–22)
CALCIUM SERPL-MCNC: 9.5 MG/DL (ref 8.5–10.5)
CHLORIDE BLD-SCNC: 108 MMOL/L (ref 98–107)
CO2 SERPL-SCNC: 26 MMOL/L (ref 22–31)
CREAT SERPL-MCNC: 0.83 MG/DL (ref 0.6–1.1)
ERYTHROCYTE [DISTWIDTH] IN BLOOD BY AUTOMATED COUNT: 12.7 % (ref 10–15)
GFR SERPL CREATININE-BSD FRML MDRD: 80 ML/MIN/1.73M2
GLUCOSE BLD-MCNC: 92 MG/DL (ref 70–125)
HBA1C MFR BLD: 5.6 % (ref 0–5.6)
HCT VFR BLD AUTO: 43.5 % (ref 35–47)
HGB BLD-MCNC: 13.7 G/DL (ref 11.7–15.7)
MCH RBC QN AUTO: 28.5 PG (ref 26.5–33)
MCHC RBC AUTO-ENTMCNC: 31.5 G/DL (ref 31.5–36.5)
MCV RBC AUTO: 90 FL (ref 78–100)
PLATELET # BLD AUTO: 282 10E3/UL (ref 150–450)
POTASSIUM BLD-SCNC: 4.9 MMOL/L (ref 3.5–5)
RBC # BLD AUTO: 4.81 10E6/UL (ref 3.8–5.2)
SODIUM SERPL-SCNC: 142 MMOL/L (ref 136–145)
TSH SERPL DL<=0.005 MIU/L-ACNC: 0.06 UIU/ML (ref 0.3–5)
WBC # BLD AUTO: 6.3 10E3/UL (ref 4–11)

## 2021-11-23 PROCEDURE — 90471 IMMUNIZATION ADMIN: CPT | Performed by: FAMILY MEDICINE

## 2021-11-23 PROCEDURE — 84443 ASSAY THYROID STIM HORMONE: CPT | Performed by: FAMILY MEDICINE

## 2021-11-23 PROCEDURE — 93010 ELECTROCARDIOGRAM REPORT: CPT | Performed by: INTERNAL MEDICINE

## 2021-11-23 PROCEDURE — 99214 OFFICE O/P EST MOD 30 MIN: CPT | Mod: 25 | Performed by: FAMILY MEDICINE

## 2021-11-23 PROCEDURE — 80048 BASIC METABOLIC PNL TOTAL CA: CPT | Performed by: FAMILY MEDICINE

## 2021-11-23 PROCEDURE — 85027 COMPLETE CBC AUTOMATED: CPT | Performed by: FAMILY MEDICINE

## 2021-11-23 PROCEDURE — 0004A COVID-19,PF,PFIZER (12+ YRS): CPT | Performed by: FAMILY MEDICINE

## 2021-11-23 PROCEDURE — 91300 COVID-19,PF,PFIZER (12+ YRS): CPT | Performed by: FAMILY MEDICINE

## 2021-11-23 PROCEDURE — 90682 RIV4 VACC RECOMBINANT DNA IM: CPT | Performed by: FAMILY MEDICINE

## 2021-11-23 PROCEDURE — 90714 TD VACC NO PRESV 7 YRS+ IM: CPT | Performed by: FAMILY MEDICINE

## 2021-11-23 PROCEDURE — 36415 COLL VENOUS BLD VENIPUNCTURE: CPT | Performed by: FAMILY MEDICINE

## 2021-11-23 PROCEDURE — 90472 IMMUNIZATION ADMIN EACH ADD: CPT | Performed by: FAMILY MEDICINE

## 2021-11-23 PROCEDURE — 83036 HEMOGLOBIN GLYCOSYLATED A1C: CPT | Performed by: FAMILY MEDICINE

## 2021-11-23 PROCEDURE — 93005 ELECTROCARDIOGRAM TRACING: CPT | Performed by: FAMILY MEDICINE

## 2021-11-23 ASSESSMENT — MIFFLIN-ST. JEOR: SCORE: 1884.09

## 2021-11-23 NOTE — PATIENT INSTRUCTIONS
-stop aspirin 1 week before surgery  -take lexapro, levothyroxine, lyrica the morning of surgery  -no need to take any other medications   Preparing for Your Surgery  Getting started  A nurse will call you to review your health history and instructions. They will give you an arrival time based on your scheduled surgery time.  Please be ready to share the following:    Your doctor's clinic name and phone number    Your medical, surgical and anesthesia history    A list of allergies and sensitivities    A list of medicines, including herbal treatments and over-the-counter drugs    Whether the patient has a legal guardian (ask how to send us the papers in advance)  If you have a child who's having surgery, please ask for a copy of Preparing for Your Child's Surgery.    Preparing for surgery    Within 30 days of surgery: Have a pre-op exam (sometimes called an H&P, or History and Physical). This can be done at a clinic or pre-operative center.  ? If you're having a , you may not need this exam. Talk to your care team    At your pre-op exam, talk to your care team about all medicines you take. If you need to stop any medicines before surgery, ask when to start taking them again.  ? We do this for your safety. Many medicines can make you bleed too much during surgery. Some change how well surgery (anesthesia) drugs work.    Call your insurance company to let them know you're having surgery. (If you don't have insurance, call [phone].)    Call your clinic if there's any change in your health. This includes signs of a cold or flu (sore throat, runny nose, cough, rash, fever). It also includes a scrape or scratch near the surgery site.    If you have questions on the day of surgery, call your hospital or surgery center.  Eating and drinking guidelines  For your safety: Unless your surgeon tells you otherwise, follow the guidelines below.    Eat and drink as usual until 8 hours before surgery. After that, no  food or milk.    Drink clear liquids until 2 hours before surgery. These are liquids you can see through, like water, Gatorade and Propel Water. You may also have black coffee and tea (no cream or milk).    Nothing by mouth within 2 hours of surgery. This includes gum, candy and breath mints.    If you drink, stop drinking alcohol the night before surgery.    If your care team tells you to take medicine on the morning of surgery, it's okay to take it with a sip of water.  Preventing infection    Shower or bathe the night before and morning of your surgery. Follow the instructions your clinic gave you. (If no instructions, use regular soap.)    Don't shave or clip hair near your surgery site. We'll remove the hair if needed.    Don't smoke or vape the morning of surgery. You may chew nicotine gum up to 2 hours before surgery. A nicotine patch is okay.  ? Note: Some surgeries require you to completely quit smoking and nicotine. Check with your surgeon.    Your care team will make every effort to keep you safe from infection. We will:  ? Clean our hands often with soap and water (or an alcohol-based hand rub).  ? Clean the skin at your surgery site with a special soap that kills germs.  ? Give you a special gown to keep you warm. (Cold raises the risk of infection.)  ? Wear special hair covers, masks, gowns and gloves during surgery.  ? Give antibiotic medicine, if prescribed. Not all surgeries need antibiotics.  What to bring on the day of surgery    Photo ID and insurance card    Copy of your health care directive, if you have one    Glasses and hearing aides (bring cases)  ? You can't wear contacts during surgery    Inhaler and eye drops, if you use them (tell us about these when you arrive)    CPAP machine or breathing device, if you use them    A few personal items, if spending the night    If you have . . .  ? A pacemaker or ICD (cardiac defibrillator): Bring the ID card.  ? An implanted stimulator: Bring the  remote control.  ? A legal guardian: Bring a copy of the certified (court-stamped) guardianship papers.  Please remove any jewelry, including body piercings. Leave jewelry and other valuables at home.  If you're going home the day of surgery  Important: If you don't follow the rules below, we must cancel your surgery.     Arrange for someone to drive you home after surgery. You may not drive, take a taxi or take public transportation by yourself (unless you'll have local anesthesia only).    Arrange for a responsible adult to stay with you overnight. If you don't, we may keep you in the hospital overnight, and you may need to pay the costs yourself.  Questions?   If you have any questions for your care team, list them here: _________________________________________________________________________________________________________________________________________________________________________________________________________________________________________________________________________________________________________________________  For informational purposes only. Not to replace the advice of your health care provider. Copyright   2003, 2019 [facility]. All rights reserved. Clinically reviewed by Renetta Gilliland MD. [employer] [id number] - REV 4/20.

## 2021-11-23 NOTE — PROGRESS NOTES
[place] [address] [facility] [address]  Phone: [phone]  Fax: [phone]  Primary Provider: Ellen Kelsey  Pre-op Performing Provider: MITCHELL VERGARA    PREOPERATIVE EVALUATION:  Today's date: 11/23/2021    Aby Giraldo is a 56 year old female who presents for a preoperative evaluation.    Surgical Information:  Surgery/Procedure: left total hip replacement  Surgery Location: [facility]  Surgeon: Dr. Scott  Surgery Date: 12/9  Time of Surgery: tbd  Where patient plans to recover: At home with family  Fax number for surgical facility: [phone]    Type of Anesthesia Anticipated: per anesthesia    Assessment & Plan     Preop general physical exam  Primary osteoarthritis of left hip  patient undergoing left total hip replacement due to osteoarthritis and chronic pain.    The proposed surgical procedure is considered INTERMEDIATE risk.    RECOMMENDATION:  APPROVAL GIVEN to proceed with proposed procedure, without further diagnostic evaluation.    - INFLUENZA QUAD, PF (RIV4) (FLUBLOK)  - TD PRESERV FREE, IM (7+ YRS) (DECAVAC/TENIVAC)  - Asymptomatic COVID-19 Virus (Coronavirus) by PCR  - EKG 12-lead, tracing only    Hypothyroidism, unspecified type  supratherapeutic on recent lab testing so her levothyroxine dose was adjusted.  Recheck today.  She will take her levothyroxine the morning of surgery.  - TSH    Hyperlipidemia LDL goal <100  stable on lovastatin which she is okay to hold the morning of surgery.    Diabetes mellitus type 2 in obese (H)  has been well controlled in the past but will recheck today.  She knows to hold Metformin the morning of surgery and can continue with her regular Ozempic schedule.  - CBC with platelets  - Basic metabolic panel  (Ca, Cl, CO2, Creat, Gluc, K, Na, BUN)  - Hemoglobin A1c    Morbid Obesity  Body mass index is 43.38 kg/m .  Recommend ongoing discussion regarding healthy diet and weight loss.    High priority  for 2019-nCoV vaccine  - COVID-19,PF,PFIZER (12+ Yrs PURPLE LABEL)      Subjective     HPI related to upcoming procedure:   Degenerative joint disease of bilateral hips.  Has been using a cane to ambulate due to significant pain.  Planning on having the right completed in December and the left in the near future as well.    Preop Questions 11/16/2021   1. Have you ever had a heart attack or stroke? No   2. Have you ever had surgery on your heart or blood vessels, such as a stent placement, a coronary artery bypass, or surgery on an artery in your head, neck, heart, or legs? No   3. Do you have chest pain with activity? No   4. Do you have a history of  heart failure? No   5. Do you currently have a cold, bronchitis or symptoms of other infection? No   6. Do you have a cough, shortness of breath, or wheezing? No   7. Do you or anyone in your family have previous history of blood clots? YES - sister hx of DVT 2/2 OCPs   8. Do you or does anyone in your family have a serious bleeding problem such as prolonged bleeding following surgeries or cuts? No   9. Have you ever had problems with anemia or been told to take iron pills? No   10. Have you had any abnormal blood loss such as black, tarry or bloody stools, or abnormal vaginal bleeding? No   11. Have you ever had a blood transfusion? No   12. Are you willing to have a blood transfusion if it is medically needed before, during, or after your surgery? Yes   13. Have you or any of your relatives ever had problems with anesthesia? No   14. Do you have sleep apnea, excessive snoring or daytime drowsiness? No   15. Do you have any artifical heart valves or other implanted medical devices like a pacemaker, defibrillator, or continuous glucose monitor? No   16. Do you have artificial joints? No   17. Are you allergic to latex? No   18. Is there any chance that you may be pregnant? No       Health Care Directive:  Patient does not have a Health Care Directive or Living Will:  Patient states has Advance Directive and will bring in a copy to clinic.    Preoperative Review of :   reviewed - no record of controlled substances prescribed.    Review of Systems  CONSTITUTIONAL: NEGATIVE for fever, chills, change in weight  INTEGUMENTARY/SKIN: NEGATIVE for worrisome rashes, moles or lesions  EYES: NEGATIVE for vision changes or irritation  ENT/MOUTH: NEGATIVE for ear, mouth and throat problems  RESP: NEGATIVE for significant cough or SOB  CV: NEGATIVE for chest pain, palpitations or peripheral edema  GI: NEGATIVE for nausea, abdominal pain, heartburn, or change in bowel habits  : NEGATIVE for frequency, dysuria, or hematuria  MUSCULOSKELETAL:POSITIVE  for Bilateral hip pain  NEURO: NEGATIVE for weakness, dizziness or paresthesias  ENDOCRINE: NEGATIVE for temperature intolerance, skin/hair changes  HEME: NEGATIVE for bleeding problems  PSYCHIATRIC: NEGATIVE for changes in mood or affect    Patient Active Problem List    Diagnosis Date Noted     Spinal stenosis of lumbar region 10/04/2021     Priority: Medium     Spondylolisthesis of lumbar region 10/04/2021     Priority: Medium     Hyperlipidemia LDL goal <100 09/23/2021     Priority: Medium     Adjustment disorder with mixed anxiety and depressed mood 09/02/2021     Priority: Medium     Endometrial hyperplasia with atypia 10/21/2020     Priority: Medium     Osteoarthritis of both hips 10/21/2020     Priority: Medium     DDD (degenerative disc disease), lumbar 10/21/2020     Priority: Medium     Diabetes mellitus type 2 in obese (H) 03/02/2018     Priority: Medium     Hypothyroidism      Priority: Medium     Created by Conversion  Replacement Utility updated for latest IMO load      Formatting of this note might be different from the original.  Created by Conversion    Replacement Utility updated for latest IMO load       Morbid Obesity      Priority: Medium     Created by Conversion  Health [facility] Annotation: Jun 1 2007  6:29PM - Chantel  "Marlee: BMI=44 6/1/07         Allergies      Priority: Medium     Created by [facility] Annotation: Jun 1 2007  6:29PM - Marlee Golden:   seasonalchocolate/strawberriestobacco          Past Medical History:   Diagnosis Date     Abdominal pain      Blood in urine      Diabetes mellitus type 2 in obese (H)      Fatty liver      HLD (hyperlipidemia)      Hypothyroidism      Morbid obesity (H)      Prediabetes      Recurrent sinus infections      Seasonal allergies      Shortness of breath      Sleep apnea in adult     Does not use CPAP     Verruca warts (infectious)      Past Surgical History:   Procedure Laterality Date     CHOLECYSTECTOMY       HC REMOVAL GALLBLADDER      Description: Cholecystectomy;  Proc Date: 03/01/2010;     OK HYSTEROSCOPY,W/ENDO BX N/A 10/28/2020    Procedure: HYSTEROSCOPY, DILATION AND CURETTAGE, POLYPECTOMY;  Surgeon: Kanika Gonzalez DO;  Location: [facility];  Service: Gynecology     Current Outpatient Medications   Medication Sig Dispense Refill     aspirin 81 MG EC tablet [ASPIRIN 81 MG EC TABLET] Take 1 tablet (81 mg total) by mouth daily. 150 tablet 2     escitalopram (LEXAPRO) 20 MG tablet Take 1 tablet (20 mg) by mouth daily 90 tablet 3     fexofenadine (ALLEGRA) 60 MG tablet [FEXOFENADINE (ALLEGRA) 60 MG TABLET] Take 60 mg by mouth daily.       levothyroxine (SYNTHROID/LEVOTHROID) 137 MCG tablet Take 1 tablet (137 mcg) by mouth daily 90 tablet 3     lovastatin (MEVACOR) 40 MG tablet TAKE 1 TABLET BY MOUTH EVERY DAY 90 tablet 3     metFORMIN (GLUCOPHAGE-XR) 500 MG 24 hr tablet Take 1 tablet (500 mg) by mouth daily (with breakfast) 90 tablet 3     pen needle, diabetic (NOVOFINE 32) 32 gauge x 1/4\" Ndle [PEN NEEDLE, DIABETIC (NOVOFINE 32) 32 GAUGE X 1/4\" NDLE] For use w/ ozempic. (Patient not taking: Reported on 10/4/2021) 50 each 0     Pregabalin (LYRICA) 200 MG capsule Take 1 capsule (200 mg) by mouth 2 times daily 180 capsule 3     semaglutide (OZEMPIC) 2 " "MG/1.5ML SOPN pen Inject 0.5 mg Subcutaneous every 7 days       vitamin D3 (CHOLECALCIFEROL) 50 mcg (2000 units) tablet Take 1 tablet by mouth daily         Allergies   Allergen Reactions     Simvastatin Diarrhea        Social History     Tobacco Use     Smoking status: Never Smoker     Smokeless tobacco: Never Used   Substance Use Topics     Alcohol use: Yes     Alcohol/week: 1.0 - 3.0 standard drink     Family History   Problem Relation Age of Onset     Thyroid Disease Mother      Asthma Mother      Hypertension Father      Heart Disease Father      Alzheimer Disease Maternal Grandfather      Cerebrovascular Disease Paternal Grandfather      Deep Vein Thrombosis Sister      Osteoporosis No family hx of      Cancer No family hx of      Other - See Comments No family hx of         Chemical dependency     Depression No family hx of      History   Drug Use No         Objective     /71   Pulse 90   Ht 1.702 m (5' 7\")   Wt 125.6 kg (277 lb)   BMI 43.38 kg/m      Physical Exam    GENERAL APPEARANCE: healthy, alert and no distress     EYES: EOMI, PERRL     HENT: ear canals and TM's normal and nose and mouth without ulcers or lesions     NECK: no adenopathy, no asymmetry, masses, or scars and thyroid normal to palpation     RESP: lungs clear to auscultation - no rales, rhonchi or wheezes     CV: regular rates and rhythm, normal S1 S2, no S3 or S4 and no murmur, click or rub     ABDOMEN:  soft, nontender, no HSM or masses      MS: extremities normal- no gross deformities noted, decreased range of motion bilateral hips     SKIN: no suspicious lesions or rashes     NEURO: Normal strength and tone, sensory exam grossly normal, mentation intact and speech normal     PSYCH: mentation appears normal. and affect normal/bright     LYMPHATICS: No cervical adenopathy    Recent Labs   Lab Test 10/04/21  1100 10/21/20  1010 07/29/20  1504   HGB 14.2 15.3 14.6    304 321    142 140   POTASSIUM 4.6 4.6 4.3   CR " 0.88 1.05 0.93   A1C  --  6.5* 6.5*        Diagnostics:  Labs pending at this time.  Results will be reviewed when available.   EKG: appears normal, NSR, normal axis, normal intervals, no acute ST/T changes c/w ischemia, no LVH by voltage criteria, there are no prior tracings available.  Individually interpreted.    Revised Cardiac Risk Index (RCRI):  The patient has the following serious cardiovascular risks for perioperative complications:   - No serious cardiac risks = 0 points     RCRI Interpretation: 0 points: Class I (very low risk - 0.4% complication rate)           Signed Electronically by: Alina Khan DO  Copy of this evaluation report is provided to requesting physician.

## 2021-11-24 RX ORDER — LEVOTHYROXINE SODIUM 125 UG/1
137 TABLET ORAL DAILY
Qty: 60 TABLET | Refills: 0 | Status: SHIPPED | OUTPATIENT
Start: 2021-11-24 | End: 2022-01-28

## 2021-12-01 LAB
ATRIAL RATE - MUSE: 79 BPM
DIASTOLIC BLOOD PRESSURE - MUSE: NORMAL MMHG
INTERPRETATION ECG - MUSE: NORMAL
P AXIS - MUSE: 19 DEGREES
PR INTERVAL - MUSE: 176 MS
QRS DURATION - MUSE: 80 MS
QT - MUSE: 378 MS
QTC - MUSE: 433 MS
R AXIS - MUSE: 12 DEGREES
SYSTOLIC BLOOD PRESSURE - MUSE: NORMAL MMHG
T AXIS - MUSE: 7 DEGREES
VENTRICULAR RATE- MUSE: 79 BPM

## 2021-12-06 ENCOUNTER — LAB (OUTPATIENT)
Dept: FAMILY MEDICINE | Facility: CLINIC | Age: 56
End: 2021-12-06
Attending: FAMILY MEDICINE
Payer: COMMERCIAL

## 2021-12-06 DIAGNOSIS — Z01.818 PREOP GENERAL PHYSICAL EXAM: ICD-10-CM

## 2021-12-06 LAB — SARS-COV-2 RNA RESP QL NAA+PROBE: NEGATIVE

## 2021-12-06 PROCEDURE — U0005 INFEC AGEN DETEC AMPLI PROBE: HCPCS

## 2021-12-06 PROCEDURE — U0003 INFECTIOUS AGENT DETECTION BY NUCLEIC ACID (DNA OR RNA); SEVERE ACUTE RESPIRATORY SYNDROME CORONAVIRUS 2 (SARS-COV-2) (CORONAVIRUS DISEASE [COVID-19]), AMPLIFIED PROBE TECHNIQUE, MAKING USE OF HIGH THROUGHPUT TECHNOLOGIES AS DESCRIBED BY CMS-2020-01-R: HCPCS

## 2021-12-09 ENCOUNTER — TRANSFERRED RECORDS (OUTPATIENT)
Dept: HEALTH INFORMATION MANAGEMENT | Facility: CLINIC | Age: 56
End: 2021-12-09
Payer: COMMERCIAL

## 2021-12-23 ENCOUNTER — TRANSFERRED RECORDS (OUTPATIENT)
Dept: HEALTH INFORMATION MANAGEMENT | Facility: CLINIC | Age: 56
End: 2021-12-23
Payer: COMMERCIAL

## 2022-01-18 VITALS — HEART RATE: 100 BPM | BODY MASS INDEX: 47.09 KG/M2 | OXYGEN SATURATION: 98 % | HEIGHT: 66 IN | WEIGHT: 293 LBS

## 2022-01-18 VITALS
RESPIRATION RATE: 16 BRPM | SYSTOLIC BLOOD PRESSURE: 108 MMHG | HEART RATE: 90 BPM | BODY MASS INDEX: 47.09 KG/M2 | TEMPERATURE: 94.1 F | DIASTOLIC BLOOD PRESSURE: 77 MMHG | HEIGHT: 66 IN | WEIGHT: 293 LBS

## 2022-01-18 VITALS
TEMPERATURE: 97.1 F | WEIGHT: 293 LBS | SYSTOLIC BLOOD PRESSURE: 124 MMHG | BODY MASS INDEX: 49.37 KG/M2 | HEART RATE: 83 BPM | DIASTOLIC BLOOD PRESSURE: 76 MMHG | RESPIRATION RATE: 16 BRPM

## 2022-01-18 VITALS
RESPIRATION RATE: 18 BRPM | OXYGEN SATURATION: 98 % | SYSTOLIC BLOOD PRESSURE: 155 MMHG | TEMPERATURE: 98 F | DIASTOLIC BLOOD PRESSURE: 100 MMHG | HEART RATE: 104 BPM

## 2022-01-18 VITALS — HEIGHT: 66 IN | BODY MASS INDEX: 47.09 KG/M2 | WEIGHT: 293 LBS

## 2022-02-02 ENCOUNTER — OFFICE VISIT (OUTPATIENT)
Dept: FAMILY MEDICINE | Facility: CLINIC | Age: 57
End: 2022-02-02
Payer: COMMERCIAL

## 2022-02-02 VITALS
WEIGHT: 267 LBS | DIASTOLIC BLOOD PRESSURE: 74 MMHG | HEART RATE: 90 BPM | SYSTOLIC BLOOD PRESSURE: 108 MMHG | TEMPERATURE: 95.6 F | BODY MASS INDEX: 42.91 KG/M2 | HEIGHT: 66 IN

## 2022-02-02 DIAGNOSIS — F43.23 ADJUSTMENT DISORDER WITH MIXED ANXIETY AND DEPRESSED MOOD: ICD-10-CM

## 2022-02-02 DIAGNOSIS — M51.369 DDD (DEGENERATIVE DISC DISEASE), LUMBAR: ICD-10-CM

## 2022-02-02 DIAGNOSIS — E78.5 HYPERLIPIDEMIA LDL GOAL <100: ICD-10-CM

## 2022-02-02 DIAGNOSIS — M16.11 PRIMARY OSTEOARTHRITIS OF RIGHT HIP: ICD-10-CM

## 2022-02-02 DIAGNOSIS — E03.9 HYPOTHYROIDISM, UNSPECIFIED TYPE: ICD-10-CM

## 2022-02-02 DIAGNOSIS — E66.01 MORBID OBESITY (H): ICD-10-CM

## 2022-02-02 DIAGNOSIS — R73.03 PREDIABETES: ICD-10-CM

## 2022-02-02 DIAGNOSIS — Z01.818 PREOP GENERAL PHYSICAL EXAM: Primary | ICD-10-CM

## 2022-02-02 LAB
ANION GAP SERPL CALCULATED.3IONS-SCNC: 9 MMOL/L (ref 5–18)
BUN SERPL-MCNC: 10 MG/DL (ref 8–22)
CALCIUM SERPL-MCNC: 10 MG/DL (ref 8.5–10.5)
CHLORIDE BLD-SCNC: 106 MMOL/L (ref 98–107)
CO2 SERPL-SCNC: 26 MMOL/L (ref 22–31)
CREAT SERPL-MCNC: 0.87 MG/DL (ref 0.6–1.1)
GFR SERPL CREATININE-BSD FRML MDRD: 78 ML/MIN/1.73M2
GLUCOSE BLD-MCNC: 88 MG/DL (ref 70–125)
HGB BLD-MCNC: 13.8 G/DL (ref 11.7–15.7)
POTASSIUM BLD-SCNC: 4.8 MMOL/L (ref 3.5–5)
SODIUM SERPL-SCNC: 141 MMOL/L (ref 136–145)
TSH SERPL DL<=0.005 MIU/L-ACNC: 0.27 UIU/ML (ref 0.3–5)

## 2022-02-02 PROCEDURE — 90732 PPSV23 VACC 2 YRS+ SUBQ/IM: CPT | Performed by: FAMILY MEDICINE

## 2022-02-02 PROCEDURE — 80048 BASIC METABOLIC PNL TOTAL CA: CPT | Performed by: FAMILY MEDICINE

## 2022-02-02 PROCEDURE — 85018 HEMOGLOBIN: CPT | Performed by: FAMILY MEDICINE

## 2022-02-02 PROCEDURE — 36415 COLL VENOUS BLD VENIPUNCTURE: CPT | Performed by: FAMILY MEDICINE

## 2022-02-02 PROCEDURE — 99214 OFFICE O/P EST MOD 30 MIN: CPT | Mod: 25 | Performed by: FAMILY MEDICINE

## 2022-02-02 PROCEDURE — 84443 ASSAY THYROID STIM HORMONE: CPT | Performed by: FAMILY MEDICINE

## 2022-02-02 PROCEDURE — 90471 IMMUNIZATION ADMIN: CPT | Performed by: FAMILY MEDICINE

## 2022-02-02 RX ORDER — PREGABALIN 200 MG/1
200 CAPSULE ORAL 2 TIMES DAILY
Qty: 180 CAPSULE | Refills: 1 | Status: SHIPPED | OUTPATIENT
Start: 2022-02-02 | End: 2022-04-04

## 2022-02-02 ASSESSMENT — MIFFLIN-ST. JEOR: SCORE: 1822.85

## 2022-02-02 NOTE — PATIENT INSTRUCTIONS
-stop 1 week before: aspirin  -take the morning of: lexapro, levothyroxine, pregabalin  -dont take the morning of: allegra, lovastatin, metformin, vitamin d    ------------------------------------  Please call your insurance company to check on Shingrix vaccine coverage (the updated shingles vaccine)        Preparing for Your Surgery  Getting started  A nurse will call you to review your health history and instructions. They will give you an arrival time based on your scheduled surgery time. Please be ready to share:    Your doctor's clinic name and phone number    Your medical, surgical and anesthesia history    A list of allergies and sensitivities    A list of medicines, including herbal treatments and over-the-counter drugs    Whether the patient has a legal guardian (ask how to send us the papers in advance)  Please tell us if you're pregnant--or if there's any chance you might be pregnant. Some surgeries may injure a fetus (unborn baby), so they require a pregnancy test. Surgeries that are safe for a fetus don't always need a test, and you can choose whether to have one.   If you have a child who's having surgery, please ask for a copy of Preparing for Your Child's Surgery.    Preparing for surgery    Within 30 days of surgery: Have a pre-op exam (sometimes called an H&P, or History and Physical). This can be done at a clinic or pre-operative center.  ? If you're having a , you may not need this exam. Talk to your care team.    At your pre-op exam, talk to your care team about all medicines you take. If you need to stop any medicines before surgery, ask when to start taking them again.  ? We do this for your safety. Many medicines can make you bleed too much during surgery. Some change how well surgery (anesthesia) drugs work.    Call your insurance company to let them know you're having surgery. (If you don't have insurance, call [phone].)    Call your clinic if there's any change in your  health. This includes signs of a cold or flu (sore throat, runny nose, cough, rash, fever). It also includes a scrape or scratch near the surgery site.    If you have questions on the day of surgery, call your hospital or surgery center.  COVID testing  You may need to be tested for COVID-19 before having surgery. If so, your surgical team will give you instructions for scheduling this test, separate from your preoperative history and physical.  Eating and drinking guidelines  For your safety: Unless your surgeon tells you otherwise, follow the guidelines below.    Eat and drink as usual until 8 hours before surgery. After that, no food or milk.    Drink clear liquids until 2 hours before surgery. These are liquids you can see through, like water, Gatorade and Propel Water. You may also have black coffee and tea (no cream or milk).    Nothing by mouth within 2 hours of surgery. This includes gum, candy and breath mints.    If you drink alcohol: Stop drinking it the night before surgery.    If your care team tells you to take medicine on the morning of surgery, it's okay to take it with a sip of water.  Preventing infection    Shower or bathe the night before and morning of your surgery. Follow the instructions your clinic gave you. (If no instructions, use regular soap.)    Don't shave or clip hair near your surgery site. We'll remove the hair if needed.    Don't smoke or vape the morning of surgery. You may chew nicotine gum up to 2 hours before surgery. A nicotine patch is okay.  ? Note: Some surgeries require you to completely quit smoking and nicotine. Check with your surgeon.    Your care team will make every effort to keep you safe from infection. We will:  ? Clean our hands often with soap and water (or an alcohol-based hand rub).  ? Clean the skin at your surgery site with a special soap that kills germs.  ? Give you a special gown to keep you warm. (Cold raises the risk of infection.)  ? Wear special hair  covers, masks, gowns and gloves during surgery.  ? Give antibiotic medicine, if prescribed. Not all surgeries need antibiotics.  What to bring on the day of surgery    Photo ID and insurance card    Copy of your health care directive, if you have one    Glasses and hearing aides (bring cases)  ? You can't wear contacts during surgery    Inhaler and eye drops, if you use them (tell us about these when you arrive)    CPAP machine or breathing device, if you use them    A few personal items, if spending the night    If you have . . .  ? A pacemaker, ICD (cardiac defibrillator) or other implant: Bring the ID card.  ? An implanted stimulator: Bring the remote control.  ? A legal guardian: Bring a copy of the certified (court-stamped) guardianship papers.  Please remove any jewelry, including body piercings. Leave jewelry and other valuables at home.  If you're going home the day of surgery    You must have a responsible adult drive you home. They should stay with you overnight as well.    If you don't have someone to stay with you, and you aren't safe to go home alone, we may keep you overnight. Insurance often won't pay for this.  After surgery  If it's hard to control your pain or you need more pain medicine, please call your surgeon's office.  Questions?   If you have any questions for your care team, list them here: _________________________________________________________________________________________________________________________________________________________________________ ____________________________________ ____________________________________ ____________________________________  For informational purposes only. Not to replace the advice of your health care provider. Copyright   2003, 2019 [facility]. All rights reserved. Clinically reviewed by Renetta Gilliland MD. [employer] [id number] - REV 07/21.

## 2022-02-02 NOTE — PROGRESS NOTES
[place] [address] [facility] [address]  Phone: [phone]  Fax: [phone]  Primary Provider: Ellen Kelsey  Pre-op Performing Provider: MITCHELL VERGARA    PREOPERATIVE EVALUATION:  Today's date: 2/2/2022    Aby Giraldo is a 56 year old female who presents for a preoperative evaluation.    Surgical Information:  Surgery/Procedure: right hip replacement  Surgery Location: [facility]   Surgeon: Dr. Jurado  Surgery Date: 2/10/22  Time of Surgery: TBD  Where patient plans to recover: At home with family  Fax number for surgical facility: [phone]    Type of Anesthesia Anticipated: per anesthesia    Assessment & Plan     The proposed surgical procedure is considered INTERMEDIATE risk.    Preop general physical exam  Primary osteoarthritis of right hip  Patient undergoing right total hip replacement due to osteoarthritis and chronic pain.  She recently had a left total hip replaced without complications.  She will hold aspirin, NSAIDs, and nonessential vitamins and supplements 1 week prior to surgery.     The proposed surgical procedure is considered INTERMEDIATE risk.     RECOMMENDATION:  APPROVAL GIVEN to proceed with proposed procedure, without further diagnostic evaluation.    - Asymptomatic COVID-19 Virus (Coronavirus) by PCR  - Hemoglobin  - Basic metabolic panel  (Ca, Cl, CO2, Creat, Gluc, K, Na, BUN)    Adjustment disorder with mixed anxiety and depressed mood  Stable on Lexapro which she will take the morning of surgery.    Hypothyroidism, unspecified type  Supratherapeutic on recent lab testing so her levothyroxine dose was adjusted.  Recheck today.  She will take her levothyroxine the morning of surgery.  - TSH    Hyperlipidemia LDL goal <100  stable on lovastatin which she is okay to hold the morning of surgery.    Morbid Obesity  Body mass index is 43.09 kg/m .  Though congratulated patient on recent weight  loss.    Prediabetes  Well-controlled with recent hemoglobin A1c of 5.6%.  She will hold her Metformin the morning of surgery, she can continue her weekly Ozempic.  Pneumonia vaccine updated today.  - PPSV23, IM/SUBQ (2+ YRS) - [id number]    DDD (degenerative disc disease), lumbar  May consider a spinal fusion in the future, pregabalin refilled for pain control.  - Pregabalin (LYRICA) 200 MG capsule  Dispense: 180 capsule; Refill: 1      Subjective     HPI related to upcoming procedure: Has severe osteoarthritis of both hips.  Underwent left total hip replacement 12/9/2021 with good results.  Now planning on having the right total hip replaced in the next week.  Due to ongoing pain and limited mobility.    Preop Questions 1/26/2022   1. Have you ever had a heart attack or stroke? No   2. Have you ever had surgery on your heart or blood vessels, such as a stent placement, a coronary artery bypass, or surgery on an artery in your head, neck, heart, or legs? No   3. Do you have chest pain with activity? No   4. Do you have a history of  heart failure? No   5. Do you currently have a cold, bronchitis or symptoms of other infection? No   6. Do you have a cough, shortness of breath, or wheezing? No   7. Do you or anyone in your family have previous history of blood clots? YES - sister hx of DVT 2/2 OCPs   8. Do you or does anyone in your family have a serious bleeding problem such as prolonged bleeding following surgeries or cuts? No   9. Have you ever had problems with anemia or been told to take iron pills? No   10. Have you had any abnormal blood loss such as black, tarry or bloody stools, or abnormal vaginal bleeding? No   11. Have you ever had a blood transfusion? No   12. Are you willing to have a blood transfusion if it is medically needed before, during, or after your surgery? Yes   13. Have you or any of your relatives ever had problems with anesthesia? No   14. Do you have sleep apnea, excessive snoring or  daytime drowsiness? No   15. Do you have any artifical heart valves or other implanted medical devices like a pacemaker, defibrillator, or continuous glucose monitor? No   16. Do you have artificial joints? YES - left total hip   17. Are you allergic to latex? No   18. Is there any chance that you may be pregnant? No       Health Care Directive:  Patient does not have a Health Care Directive or Living Will: Patient states has Advance Directive and will bring in a copy to clinic.    Preoperative Review of :   reviewed - no record of controlled substances prescribed.      Review of Systems  CONSTITUTIONAL: NEGATIVE for fever, chills, change in weight  INTEGUMENTARY/SKIN: NEGATIVE for worrisome rashes, moles or lesions  EYES: NEGATIVE for vision changes or irritation  ENT/MOUTH: NEGATIVE for ear, mouth and throat problems  RESP: NEGATIVE for significant cough or SOB  CV: NEGATIVE for chest pain, palpitations or peripheral edema  GI: NEGATIVE for nausea, abdominal pain, heartburn, or change in bowel habits  : NEGATIVE for frequency, dysuria, or hematuria  MUSCULOSKELETAL: Positive for chronic right hip and back pain  NEURO: NEGATIVE for weakness, dizziness or paresthesias  ENDOCRINE: NEGATIVE for temperature intolerance, skin/hair changes  HEME: NEGATIVE for bleeding problems  PSYCHIATRIC: NEGATIVE for changes in mood or affect    Patient Active Problem List    Diagnosis Date Noted     Spinal stenosis of lumbar region 10/04/2021     Priority: Medium     Spondylolisthesis of lumbar region 10/04/2021     Priority: Medium     Hyperlipidemia LDL goal <100 09/23/2021     Priority: Medium     Adjustment disorder with mixed anxiety and depressed mood 09/02/2021     Priority: Medium     Endometrial hyperplasia with atypia 10/21/2020     Priority: Medium     Osteoarthritis of both hips 10/21/2020     Priority: Medium     DDD (degenerative disc disease), lumbar 10/21/2020     Priority: Medium     Diabetes mellitus type 2  in obese (H) 03/02/2018     Priority: Medium     Hypothyroidism      Priority: Medium     Created by Conversion  Replacement Utility updated for latest IMO load      Formatting of this note might be different from the original.  Created by Conversion    Replacement Utility updated for latest IMO load       Morbid Obesity      Priority: Medium     Created by Conversion  [facility] Annotation: Jun 1 2007  6:29PM - Marlee Golden: BMI=44 6/1/07         Allergies      Priority: Medium     Created by Conversion  [employer] [facility] Annotation: Jun 1 2007  6:29PM - Marlee Golden:   seasonalchocolate/strawberriestobacco          Past Medical History:   Diagnosis Date     Abdominal pain      Blood in urine      Diabetes mellitus type 2 in obese (H)      Fatty liver      HLD (hyperlipidemia)      Hypothyroidism      Morbid obesity (H)      Prediabetes      Recurrent sinus infections      Seasonal allergies      Shortness of breath      Sleep apnea in adult     Does not use CPAP     Verruca warts (infectious)      Past Surgical History:   Procedure Laterality Date     CHOLECYSTECTOMY       HC REMOVAL GALLBLADDER      Description: Cholecystectomy;  Proc Date: 03/01/2010;     CA HYSTEROSCOPY,W/ENDO BX N/A 10/28/2020    Procedure: HYSTEROSCOPY, DILATION AND CURETTAGE, POLYPECTOMY;  Surgeon: Kanika Gonzalez DO;  Location: [facility];  Service: Gynecology     TOTAL HIP ARTHROPLASTY Left 12/09/2021     Current Outpatient Medications   Medication Sig Dispense Refill     aspirin 81 MG EC tablet [ASPIRIN 81 MG EC TABLET] Take 1 tablet (81 mg total) by mouth daily. 150 tablet 2     escitalopram (LEXAPRO) 20 MG tablet Take 1 tablet (20 mg) by mouth daily 90 tablet 3     fexofenadine (ALLEGRA) 60 MG tablet [FEXOFENADINE (ALLEGRA) 60 MG TABLET] Take 60 mg by mouth daily.       levothyroxine (SYNTHROID/LEVOTHROID) 125 MCG tablet TAKE 1 TABLET BY MOUTH EVERY DAY 90 tablet 1     lovastatin (MEVACOR) 40 MG tablet TAKE 1 TABLET BY  "MOUTH EVERY DAY 90 tablet 3     metFORMIN (GLUCOPHAGE-XR) 500 MG 24 hr tablet Take 1 tablet (500 mg) by mouth daily (with breakfast) 90 tablet 3     pen needle, diabetic (NOVOFINE 32) 32 gauge x 1/4\" Ndle [PEN NEEDLE, DIABETIC (NOVOFINE 32) 32 GAUGE X 1/4\" NDLE] For use w/ ozempic. 50 each 0     Pregabalin (LYRICA) 200 MG capsule Take 1 capsule (200 mg) by mouth 2 times daily 180 capsule 1     semaglutide (OZEMPIC) 2 MG/1.5ML SOPN pen Inject 0.5 mg Subcutaneous every 7 days 1.5 mL 3     vitamin D3 (CHOLECALCIFEROL) 50 mcg (2000 units) tablet Take 1 tablet by mouth daily         Allergies   Allergen Reactions     Simvastatin Diarrhea        Social History     Tobacco Use     Smoking status: Never Smoker     Smokeless tobacco: Never Used   Substance Use Topics     Alcohol use: Yes     Alcohol/week: 1.0 - 3.0 standard drink     Family History   Problem Relation Age of Onset     Thyroid Disease Mother      Asthma Mother      Hypertension Father      Heart Disease Father      Alzheimer Disease Maternal Grandfather      Cerebrovascular Disease Paternal Grandfather      Deep Vein Thrombosis Sister      Osteoporosis No family hx of      Cancer No family hx of      Other - See Comments No family hx of         Chemical dependency     Depression No family hx of      History   Drug Use No         Objective     /74   Pulse 90   Temp (!) 95.6  F (35.3  C) (Oral)   Ht 1.676 m (5' 6\")   Wt 121.1 kg (267 lb)   BMI 43.09 kg/m      Physical Exam    GENERAL APPEARANCE: healthy, alert and no distress     EYES: EOMI, PERRL     HENT: ear canals and TM's normal and nose and mouth without ulcers or lesions     NECK: no adenopathy, no asymmetry, masses, or scars and thyroid normal to palpation     RESP: lungs clear to auscultation - no rales, rhonchi or wheezes     CV: regular rates and rhythm, normal S1 S2, no S3 or S4 and no murmur, click or rub     ABDOMEN:  soft, nontender, no HSM or masses and bowel sounds normal     MS: " Decreased range of motion right hip, using rolling walker to ambulate     SKIN: no suspicious lesions or rashes     NEURO: Normal strength and tone, sensory exam grossly normal, mentation intact and speech normal     PSYCH: mentation appears normal. and affect normal/bright     LYMPHATICS: No cervical adenopathy    Recent Labs   Lab Test 11/23/21  0939 10/04/21  1100 10/21/20  1010   HGB 13.7 14.2 15.3    267 304    141 142   POTASSIUM 4.9 4.6 4.6   CR 0.83 0.88 1.05   A1C 5.6  --  6.5*        Diagnostics:  Labs pending at this time.  Results will be reviewed when available.   No EKG this visit, completed in the last 90 days.    Revised Cardiac Risk Index (RCRI):  The patient has the following serious cardiovascular risks for perioperative complications:   - No serious cardiac risks = 0 points     RCRI Interpretation: 0 points: Class I (very low risk - 0.4% complication rate)           Signed Electronically by: Alina Khan DO  Copy of this evaluation report is provided to requesting physician.

## 2022-02-03 RX ORDER — LEVOTHYROXINE SODIUM 112 UG/1
112 TABLET ORAL DAILY
Qty: 90 TABLET | Refills: 0 | Status: SHIPPED | OUTPATIENT
Start: 2022-02-03 | End: 2022-05-05

## 2022-02-07 ENCOUNTER — LAB (OUTPATIENT)
Dept: LAB | Facility: CLINIC | Age: 57
End: 2022-02-07
Payer: COMMERCIAL

## 2022-02-07 DIAGNOSIS — Z01.818 PREOP GENERAL PHYSICAL EXAM: ICD-10-CM

## 2022-02-07 DIAGNOSIS — Z01.818 PREOP GENERAL PHYSICAL EXAM: Primary | ICD-10-CM

## 2022-02-07 LAB
ERYTHROCYTE [DISTWIDTH] IN BLOOD BY AUTOMATED COUNT: 12.9 % (ref 10–15)
HCT VFR BLD AUTO: 44.5 % (ref 35–47)
HGB BLD-MCNC: 14.1 G/DL (ref 11.7–15.7)
MCH RBC QN AUTO: 28.3 PG (ref 26.5–33)
MCHC RBC AUTO-ENTMCNC: 31.7 G/DL (ref 31.5–36.5)
MCV RBC AUTO: 89 FL (ref 78–100)
PLATELET # BLD AUTO: 303 10E3/UL (ref 150–450)
RBC # BLD AUTO: 4.99 10E6/UL (ref 3.8–5.2)
WBC # BLD AUTO: 6.2 10E3/UL (ref 4–11)

## 2022-02-07 PROCEDURE — U0005 INFEC AGEN DETEC AMPLI PROBE: HCPCS

## 2022-02-07 PROCEDURE — U0003 INFECTIOUS AGENT DETECTION BY NUCLEIC ACID (DNA OR RNA); SEVERE ACUTE RESPIRATORY SYNDROME CORONAVIRUS 2 (SARS-COV-2) (CORONAVIRUS DISEASE [COVID-19]), AMPLIFIED PROBE TECHNIQUE, MAKING USE OF HIGH THROUGHPUT TECHNOLOGIES AS DESCRIBED BY CMS-2020-01-R: HCPCS

## 2022-02-07 PROCEDURE — 85027 COMPLETE CBC AUTOMATED: CPT

## 2022-02-07 PROCEDURE — 36415 COLL VENOUS BLD VENIPUNCTURE: CPT

## 2022-02-08 LAB — SARS-COV-2 RNA RESP QL NAA+PROBE: NEGATIVE

## 2022-02-10 ENCOUNTER — TRANSFERRED RECORDS (OUTPATIENT)
Dept: HEALTH INFORMATION MANAGEMENT | Facility: CLINIC | Age: 57
End: 2022-02-10
Payer: COMMERCIAL

## 2022-02-24 ENCOUNTER — TRANSFERRED RECORDS (OUTPATIENT)
Dept: HEALTH INFORMATION MANAGEMENT | Facility: CLINIC | Age: 57
End: 2022-02-24
Payer: COMMERCIAL

## 2022-03-24 ENCOUNTER — TRANSFERRED RECORDS (OUTPATIENT)
Dept: HEALTH INFORMATION MANAGEMENT | Facility: CLINIC | Age: 57
End: 2022-03-24
Payer: COMMERCIAL

## 2022-04-04 ENCOUNTER — MYC MEDICAL ADVICE (OUTPATIENT)
Dept: FAMILY MEDICINE | Facility: CLINIC | Age: 57
End: 2022-04-04

## 2022-04-04 ENCOUNTER — OFFICE VISIT (OUTPATIENT)
Dept: FAMILY MEDICINE | Facility: CLINIC | Age: 57
End: 2022-04-04
Payer: COMMERCIAL

## 2022-04-04 ENCOUNTER — MEDICAL CORRESPONDENCE (OUTPATIENT)
Dept: HEALTH INFORMATION MANAGEMENT | Facility: CLINIC | Age: 57
End: 2022-04-04

## 2022-04-04 VITALS
WEIGHT: 275 LBS | DIASTOLIC BLOOD PRESSURE: 86 MMHG | BODY MASS INDEX: 44.39 KG/M2 | SYSTOLIC BLOOD PRESSURE: 132 MMHG | TEMPERATURE: 98.6 F | HEART RATE: 97 BPM | RESPIRATION RATE: 16 BRPM

## 2022-04-04 DIAGNOSIS — G89.18 ACUTE POSTOPERATIVE PAIN OF HIP, UNSPECIFIED LATERALITY: ICD-10-CM

## 2022-04-04 DIAGNOSIS — M25.559 ACUTE POSTOPERATIVE PAIN OF HIP, UNSPECIFIED LATERALITY: ICD-10-CM

## 2022-04-04 DIAGNOSIS — M48.061 SPINAL STENOSIS OF LUMBAR REGION, UNSPECIFIED WHETHER NEUROGENIC CLAUDICATION PRESENT: ICD-10-CM

## 2022-04-04 DIAGNOSIS — N85.8 UTERINE MASS: ICD-10-CM

## 2022-04-04 DIAGNOSIS — Z23 HIGH PRIORITY FOR 2019-NCOV VACCINE: ICD-10-CM

## 2022-04-04 DIAGNOSIS — E66.9 DIABETES MELLITUS TYPE 2 IN OBESE: Primary | ICD-10-CM

## 2022-04-04 DIAGNOSIS — E11.69 DIABETES MELLITUS TYPE 2 IN OBESE: Primary | ICD-10-CM

## 2022-04-04 LAB — HBA1C MFR BLD: 5.2 % (ref 0–5.6)

## 2022-04-04 PROCEDURE — 91305 COVID-19,PF,PFIZER (12+ YRS): CPT | Performed by: FAMILY MEDICINE

## 2022-04-04 PROCEDURE — 90471 IMMUNIZATION ADMIN: CPT | Performed by: FAMILY MEDICINE

## 2022-04-04 PROCEDURE — 0054A COVID-19,PF,PFIZER (12+ YRS): CPT | Performed by: FAMILY MEDICINE

## 2022-04-04 PROCEDURE — 83036 HEMOGLOBIN GLYCOSYLATED A1C: CPT | Performed by: FAMILY MEDICINE

## 2022-04-04 PROCEDURE — 90750 HZV VACC RECOMBINANT IM: CPT | Performed by: FAMILY MEDICINE

## 2022-04-04 PROCEDURE — 36415 COLL VENOUS BLD VENIPUNCTURE: CPT | Performed by: FAMILY MEDICINE

## 2022-04-04 PROCEDURE — 99214 OFFICE O/P EST MOD 30 MIN: CPT | Mod: 25 | Performed by: FAMILY MEDICINE

## 2022-04-04 ASSESSMENT — ANXIETY QUESTIONNAIRES
5. BEING SO RESTLESS THAT IT IS HARD TO SIT STILL: SEVERAL DAYS
3. WORRYING TOO MUCH ABOUT DIFFERENT THINGS: MORE THAN HALF THE DAYS
6. BECOMING EASILY ANNOYED OR IRRITABLE: MORE THAN HALF THE DAYS
4. TROUBLE RELAXING: MORE THAN HALF THE DAYS
GAD7 TOTAL SCORE: 13
GAD7 TOTAL SCORE: 13
7. FEELING AFRAID AS IF SOMETHING AWFUL MIGHT HAPPEN: MORE THAN HALF THE DAYS
GAD7 TOTAL SCORE: 13
1. FEELING NERVOUS, ANXIOUS, OR ON EDGE: MORE THAN HALF THE DAYS
7. FEELING AFRAID AS IF SOMETHING AWFUL MIGHT HAPPEN: MORE THAN HALF THE DAYS
2. NOT BEING ABLE TO STOP OR CONTROL WORRYING: MORE THAN HALF THE DAYS

## 2022-04-04 ASSESSMENT — PATIENT HEALTH QUESTIONNAIRE - PHQ9
SUM OF ALL RESPONSES TO PHQ QUESTIONS 1-9: 14
SUM OF ALL RESPONSES TO PHQ QUESTIONS 1-9: 14
10. IF YOU CHECKED OFF ANY PROBLEMS, HOW DIFFICULT HAVE THESE PROBLEMS MADE IT FOR YOU TO DO YOUR WORK, TAKE CARE OF THINGS AT HOME, OR GET ALONG WITH OTHER PEOPLE: VERY DIFFICULT

## 2022-04-04 NOTE — PROGRESS NOTES
Assessment & Plan       ICD-10-CM    1. Diabetes mellitus type 2 in obese (H)  E11.69 Hemoglobin A1c    E66.9 Hemoglobin A1c   2. High priority for 2019-nCoV vaccine  Z23 COVID-19,PF,PFIZER (12+ Yrs GRAY LABEL)     CANCELED: COVID-19,PF,PFIZER (12+ Yrs GRAY LABEL)   3. Acute postoperative pain of hip, unspecified laterality  G89.18 Physical Therapy Referral    M25.559    4. Uterine mass  N85.8 Ob/Gyn Referral   5. Spinal stenosis of lumbar region, unspecified whether neurogenic claudication present  M48.061 Orthopedic  Referral     Please call [facility] to set you PT for your hips.    We will extend you short term disability through [employer] for hip pain and recovery after surgeries.  Please send Dr. Kelsey the paperwork.    We will set a follow-up with Dr. Kelsey shortly before Susan 15.    Great job with your weight loss.    [facility] spine doctor referral to set the appointment to see them in about 6 weeks.  This is to discuss your back pain as well as spinal stenosis to see if surgery is recommended or not.    I am checking your A1c your blood test for diabetes.  If it is again low in the 5's we can take you off Metformin but leave you on the Ozempic.    OB/GYN referral for growth and uterus presumed fibroid.  Given this to [facility] in [place].  You can call [phone] to set that up.    Shingles shot today.  Second shot will be due in 2 to 6 months.    Covid booster given today.    To see the eye doctor.    There is a little bit of wax in your right ear canal.  If you wish you can use some Debrox earwax removal drops weekly and rinse in the shower.    At your next visit we will assess if you need an additional antidepres maris/antianxiety medication.      30 minutes spent on the date of the encounter doing chart review, history and exam, documentation and further activities per the note       BMI:   Estimated body mass index is 44.39 kg/m  as calculated from the following:    Height  "as of 2/2/22: 1.676 m (5' 6\").    Weight as of this encounter: 124.7 kg (275 lb).       Depression Screening Follow Up    PHQ 4/4/2022   PHQ-9 Total Score 14   Q9: Thoughts of better off dead/self-harm past 2 weeks Not at all     Last PHQ-9 4/4/2022   1.  Little interest or pleasure in doing things 1   2.  Feeling down, depressed, or hopeless 2   3.  Trouble falling or staying asleep, or sleeping too much 2   4.  Feeling tired or having little energy 2   5.  Poor appetite or overeating 2   6.  Feeling bad about yourself 1   7.  Trouble concentrating 3   8.  Moving slowly or restless 1   Q9: Thoughts of better off dead/self-harm past 2 weeks 0   PHQ-9 Total Score 14   Difficulty at work, home, or with people -       Follow Up Actions Taken  Crisis resource information provided in After Visit Summary  In couseling and on medication         No follow-ups on file.    Ellen Kelsey MD  [facility]    Josiah Badillo is a 56 year old who presents for the following health issues     Imm/Inj    History of Present Illness       Mental Health Follow-up:  Patient presents to follow-up on Depression & Anxiety.Patient's depression since last visit has been:  Bad  The patient is having other symptoms associated with depression.  Patient's anxiety since last visit has been:  Bad  The patient is having other symptoms associated with anxiety.  Any significant life events: job concerns and health concerns  Patient is feeling anxious or having panic attacks.  Patient has no concerns about alcohol or drug use.       Today's PHQ-9         PHQ-9 Total Score: 14  PHQ-9 Q9 Thoughts of better off dead/self-harm past 2 weeks :   (P) Not at all    How difficult have these problems made it for you to do your work, take care of things at home, or get along with other people: Very difficult    Today's HELGA-7 Score: 13    She eats 2-3 servings of fruits and vegetables daily.She consumes 1 sweetened beverage(s) " daily.She exercises with enough effort to increase her heart rate 20 to 29 minutes per day.  She exercises with enough effort to increase her heart rate 3 or less days per week. She is missing 1 dose(s) of medications per week.     Hip surgery / replacement in Dec 2021. Right hip replaced Feb. 2022. :  Seeing ortho, still feels unsteady on her feet.  Told to ask primary about order for PT. is not ready to go back to work physically as she has not done her physical therapy yet after her most recent hip replacement.  Off work through April 15 from ortho.    Depression and anxiety:  Having depression and anxiety.  Feels mentally foggy. Struggle for words, does not feel like herself.  No suicidal or homicidal ideation.  Worried about going back to work.  Doing virtual counseling.     Back:  Has spinal stenosis.  Saw 3 different spine doctors.  1st doctor told her to loose weight.  2nd doctor recommend surgery, 3 level fusion and decompression.  3rd doctor, recommended having the hip surgery first and that may help back feel better.   Feels like the back is a little better after her hip surgeries.  Pain was 8/10, now 4/10.    Right ear discomfort:  Over the last few days.          Review of Systems         Objective    /86 (BP Location: Left arm, Patient Position: Sitting, Cuff Size: Adult Large)   Pulse 97   Temp 98.6  F (37  C) (Oral)   Resp 16   Wt 124.7 kg (275 lb)   BMI 44.39 kg/m    Body mass index is 44.39 kg/m .  Physical Exam   GENERAL: healthy, alert and no distress

## 2022-04-04 NOTE — PATIENT INSTRUCTIONS
Please call [facility] to set you PT for your hips.    We will extend you short term disability through [employer] for hip pain and recovery after surgeries.  Please send Dr. Kelsey the paperwork.    We will set a follow-up with Dr. Kelsey shortly before Susan 15.    Great job with your weight loss.    [facility] spine doctor referral to set the appointment to see them in about 6 weeks.  This is to discuss your back pain as well as spinal stenosis to see if surgery is recommended or not.    I am checking your A1c your blood test for diabetes.  If it is again low in the 5's we can take you off Metformin but leave you on the Ozempic.    OB/GYN referral for growth and uterus presumed fibroid.  Given this to [facility] in [place].  You can call [phone] to set that up.    Shingles shot today.  Second shot will be due in 2 to 6 months.    Covid booster given today.    To see the eye doctor.    There is a little bit of wax in your right ear canal.  If you wish you can use some Debrox earwax removal drops weekly and rinse in the shower.    At your next visit we will assess if you need an additional antidepres maris/antianxiety medication.

## 2022-04-05 ASSESSMENT — ANXIETY QUESTIONNAIRES: GAD7 TOTAL SCORE: 13

## 2022-04-05 ASSESSMENT — PATIENT HEALTH QUESTIONNAIRE - PHQ9: SUM OF ALL RESPONSES TO PHQ QUESTIONS 1-9: 14

## 2022-04-18 ENCOUNTER — TRANSFERRED RECORDS (OUTPATIENT)
Dept: HEALTH INFORMATION MANAGEMENT | Facility: CLINIC | Age: 57
End: 2022-04-18
Payer: COMMERCIAL

## 2022-04-19 ENCOUNTER — TRANSFERRED RECORDS (OUTPATIENT)
Dept: HEALTH INFORMATION MANAGEMENT | Facility: CLINIC | Age: 57
End: 2022-04-19

## 2022-06-03 DIAGNOSIS — E66.9 DIABETES MELLITUS TYPE 2 IN OBESE: ICD-10-CM

## 2022-06-03 DIAGNOSIS — E11.69 DIABETES MELLITUS TYPE 2 IN OBESE: ICD-10-CM

## 2022-06-05 RX ORDER — SEMAGLUTIDE 1.34 MG/ML
INJECTION, SOLUTION SUBCUTANEOUS
Qty: 1.5 ML | Refills: 4 | Status: SHIPPED | OUTPATIENT
Start: 2022-06-05 | End: 2022-10-03

## 2022-06-05 NOTE — TELEPHONE ENCOUNTER
"Last Written Prescription Date:  12/7/21  Last Fill Quantity: 1.5,  # refills: 3   Last office visit provider:  4/4/22     Requested Prescriptions   Pending Prescriptions Disp Refills     OZEMPIC (0.25 OR 0.5 MG/DOSE) 2 MG/1.5ML SOPN pen [Pharmacy Med Name: OZEMPIC 0.25-0.5 MG/DOSE PEN]  3     Sig: INJECT 0.5 MG SUBCUTANEOUS EVERY 7 DAYS       GLP-1 Agonists Protocol Passed - 6/3/2022  3:49 PM        Passed - HgbA1C in past 3 or 6 months     If HgbA1C is 8 or greater, it needs to be on file within the past 3 months.  If less than 8, must be on file within the past 6 months.     Recent Labs   Lab Test 04/04/22  1539   A1C 5.2             Passed - Medication is active on med list        Passed - Patient is age 18 or older        Passed - No active pregnancy on record        Passed - Normal serum creatinine on file in past 12 months     Recent Labs   Lab Test 02/02/22  1038   CR 0.87       Ok to refill medication if creatinine is low          Passed - No positive pregnancy test in past 12 months        Passed - Recent (6 mo) or future (30 days) visit within the authorizing provider's specialty     Patient had office visit in the last 6 months or has a visit in the next 30 days with authorizing provider.  See \"Patient Info\" tab in inbasket, or \"Choose Columns\" in Meds & Orders section of the refill encounter.                 Linda Shaw RN 06/05/22 1:40 PM  "

## 2022-06-06 ENCOUNTER — TRANSFERRED RECORDS (OUTPATIENT)
Dept: HEALTH INFORMATION MANAGEMENT | Facility: CLINIC | Age: 57
End: 2022-06-06
Payer: COMMERCIAL

## 2022-06-08 ENCOUNTER — NURSE TRIAGE (OUTPATIENT)
Dept: FAMILY MEDICINE | Facility: CLINIC | Age: 57
End: 2022-06-08

## 2022-06-08 ENCOUNTER — TELEPHONE (OUTPATIENT)
Dept: FAMILY MEDICINE | Facility: CLINIC | Age: 57
End: 2022-06-08
Payer: COMMERCIAL

## 2022-06-08 ASSESSMENT — PATIENT HEALTH QUESTIONNAIRE - PHQ9
SUM OF ALL RESPONSES TO PHQ QUESTIONS 1-9: 9
SUM OF ALL RESPONSES TO PHQ QUESTIONS 1-9: 9
10. IF YOU CHECKED OFF ANY PROBLEMS, HOW DIFFICULT HAVE THESE PROBLEMS MADE IT FOR YOU TO DO YOUR WORK, TAKE CARE OF THINGS AT HOME, OR GET ALONG WITH OTHER PEOPLE: SOMEWHAT DIFFICULT

## 2022-06-08 NOTE — TELEPHONE ENCOUNTER
"Contacted patient at the request of Dr. Kelsey to triage patient based on answers to a PHQ-9 that suggested she is having suicidal ideation several days over the past 2 weeks. Patient denied any current suicidal or homicidal thoughts and states that she has no plan to harm herself. Patient states that she has been dealing with a lot lately, as she has basically been home-bound on a leave from work and just found out Monday that she will be needing back surgery. Work will call her occasionally with requests which increases her anxiety. Also of significance in her life right now, her dog (who she says is her best friend) had to go to \"doggy boot camp\" to improve his behavior and she is feeling somewhat lost without him. Her ozempic also recently ran out and is on backorder through the pharmacy which has caused some additional stress. Patient reports having talked with her therapist (who she sees weekly) prior to my call and that speaking with him helped her a great deal. Patient lives with her parents and reports a good support system at home. Patient does have an appointment with Dr. Kelsey tomorrow and plans to keep that. I urged patient to reach out to us or her therapist if these symptoms return and informed her of the nurse advice line which is available 24/7 should she need additional assistance. Reinforced the need to call 911 or seek emergency care if depression becomes overwhelming and/or keeps her from being able to perform normal daily activities. Patient thanked me for her call and is looking forward to seeing Dr. Kelsey in clinic tomorrow.    Allie Lipscomb RN    PHQ 10/4/2021 4/4/2022 6/8/2022   PHQ-9 Total Score 6 14 9   Q9: Thoughts of better off dead/self-harm past 2 weeks Not at all Not at all Several days   F/U: Thoughts of suicide or self-harm - - No   F/U: Safety concerns - - No       Reason for Disposition    Depression is worsening (e.g.,sleeping poorly, less able to do activities of daily " "living)    Additional Information    Negative: Patient attempted suicide    Negative: Patient is threatening suicide now    Negative: Violent behavior, or threatening to physically hurt or kill someone    Negative: Patient is very confused (disoriented, slurred speech) and no other adult (e.g., friend or family member) available    Negative: Difficult to awaken or acting very confused (disoriented, slurred speech) and new onset    Negative: Sounds like a life-threatening emergency to the triager    Negative: Alcohol use, abuse or dependence, question or problem related to    Negative: Drug abuse or dependence, question or problem related to    Negative: Suicide thoughts, threats, attempts, or questions    Negative: Anxiety is main problem or symptom    Negative: Depression and unable to do any of normal activities (e.g., self care, school, work; in comparison to baseline).    Negative: Very strange or confused behavior    Negative: Patient sounds very sick or weak to the triager    Negative: Fever > 101 F (38.3 C)    Negative: Sometimes has thoughts of suicide    Negative: Symptoms interfere with work or school    Answer Assessment - Initial Assessment Questions  1. CONCERN: \"What happened that made you call today?\"      N/A - was asked to reach out to patient by Dr. Kelsey in response to PHQ-9 answers that were positive for suicidal ideation  2. DEPRESSION SYMPTOM SCREENING: \"How are you feeling overall?\" (e.g., decreased energy, increased sleeping or difficulty sleeping, difficulty concentrating, feelings of sadness, guilt, hopelessness, or worthlessness)      Depleted the past couple of days, just discussed this with therapist  3. RISK OF HARM - SUICIDAL IDEATION:  \"Do you ever have thoughts of hurting or killing yourself?\"  (e.g., yes, no, no but preoccupation with thoughts about death)    - INTENT:  \"Do you have thoughts of hurting or killing yourself right NOW?\" (e.g., yes, no, N/A)    - PLAN: \"Do you have a " "specific plan for how you would do this?\" (e.g., gun, knife, overdose, no plan, N/A)      None  4. RISK OF HARM - HOMICIDAL IDEATION:  \"Do you ever have thoughts of hurting or killing someone else?\"  (e.g., yes, no, no but preoccupation with thoughts about death)    - INTENT:  \"Do you have thoughts of hurting or killing someone right NOW?\" (e.g., yes, no, N/A)    - PLAN: \"Do you have a specific plan for how you would do this?\" (e.g., gun, knife, no plan, N/A)       None  5. FUNCTIONAL IMPAIRMENT: \"How have things been going for you overall in your life? Have you had any more difficulties than usual doing your normal daily activities?\"  (e.g., better, same, worse; self-care, school, work, interactions)      Is homebound  6. SUPPORT: \"Who is with you now?\" \"Who do you live with?\" \"Do you have family or friends nearby who you can talk to?\"       Does live with her parents, does talk to a therapist weekly  7. THERAPIST: \"Do you have a counselor or therapist? Name?\"      C[facility] Cantu at [facility]  8. STRESSORS: \"Has there been any new stress or recent changes in your life?\"      Just found out that she needed back surgery on Monday, Dog recently had to go to \"[employer]\" to improve behavior  9. DRUG ABUSE/ALCOHOL: \"Do you drink alcohol or use any illegal drugs?\"       None  10. OTHER: \"Do you have any other health or medical symptoms right now?\" (e.g., fever)        Back pain  11. PREGNANCY: \"Is there any chance you are pregnant?\" \"When was your last menstrual period?\"        N/A    Protocols used: DEPRESSION-A-OH      "

## 2022-06-08 NOTE — PROGRESS NOTES
Please call patient and ensure she is safe.  Thank you.      Answers for HPI/ROS submitted by the patient on 6/8/2022  If you checked off any problems, how difficult have these problems made it for you to do your work, take care of things at home, or get along with other people?: Somewhat difficult  PHQ9 TOTAL SCORE: 9

## 2022-06-08 NOTE — TELEPHONE ENCOUNTER
I got and noticed in my in basket that she had some suicidal thoughts on her PHQ-9.  I know I am seeing her tomorrow but please give her a call to make sure she is safe.  Thank you.

## 2022-06-09 ENCOUNTER — OFFICE VISIT (OUTPATIENT)
Dept: FAMILY MEDICINE | Facility: CLINIC | Age: 57
End: 2022-06-09
Payer: COMMERCIAL

## 2022-06-09 VITALS
WEIGHT: 275 LBS | RESPIRATION RATE: 16 BRPM | SYSTOLIC BLOOD PRESSURE: 129 MMHG | DIASTOLIC BLOOD PRESSURE: 77 MMHG | HEART RATE: 84 BPM | BODY MASS INDEX: 44.39 KG/M2 | TEMPERATURE: 97.4 F

## 2022-06-09 DIAGNOSIS — E66.9 DIABETES MELLITUS TYPE 2 IN OBESE: Primary | ICD-10-CM

## 2022-06-09 DIAGNOSIS — F43.23 ADJUSTMENT DISORDER WITH MIXED ANXIETY AND DEPRESSED MOOD: ICD-10-CM

## 2022-06-09 DIAGNOSIS — E11.69 DIABETES MELLITUS TYPE 2 IN OBESE: Primary | ICD-10-CM

## 2022-06-09 DIAGNOSIS — M48.061 SPINAL STENOSIS OF LUMBAR REGION, UNSPECIFIED WHETHER NEUROGENIC CLAUDICATION PRESENT: ICD-10-CM

## 2022-06-09 PROCEDURE — 90471 IMMUNIZATION ADMIN: CPT | Performed by: FAMILY MEDICINE

## 2022-06-09 PROCEDURE — 90750 HZV VACC RECOMBINANT IM: CPT | Performed by: FAMILY MEDICINE

## 2022-06-09 PROCEDURE — 99214 OFFICE O/P EST MOD 30 MIN: CPT | Mod: 25 | Performed by: FAMILY MEDICINE

## 2022-06-09 ASSESSMENT — PATIENT HEALTH QUESTIONNAIRE - PHQ9
SUM OF ALL RESPONSES TO PHQ QUESTIONS 1-9: 9
10. IF YOU CHECKED OFF ANY PROBLEMS, HOW DIFFICULT HAVE THESE PROBLEMS MADE IT FOR YOU TO DO YOUR WORK, TAKE CARE OF THINGS AT HOME, OR GET ALONG WITH OTHER PEOPLE: SOMEWHAT DIFFICULT

## 2022-06-09 NOTE — PROGRESS NOTES
"  Assessment & Plan       ICD-10-CM    1. Diabetes mellitus type 2 in obese (H)  E11.69 OPTOMETRY REFERRAL    E66.9    2. Spinal stenosis of lumbar region, unspecified whether neurogenic claudication present  M48.061    3. Adjustment disorder with mixed anxiety and depressed mood  F43.23      Patient Instructions   Will fill out FMLA forms to off 6-15-22 through 8-15-22.    Will e-mail FMLA formd to Kavon Caballero e-mail to [email or web address]    Continue PT for gait instability.    To see the eye doctor.    When you know when surgery will happen, we need a preop physical within 30 days of your surgery and let us know and we help schedule.    Annual wellness / prevent visit due in Oct.     Shingles shot today.    Continue therapy and Escitalopram at 20 mg daily.        30 minutes spent on the date of the encounter doing chart review, history and exam, documentation and further activities per the note       BMI:   Estimated body mass index is 44.39 kg/m  as calculated from the following:    Height as of 2/2/22: 1.676 m (5' 6\").    Weight as of this encounter: 124.7 kg (275 lb).       Depression Screening Follow Up    PHQ 6/8/2022   PHQ-9 Total Score 9   Q9: Thoughts of better off dead/self-harm past 2 weeks Several days   F/U: Thoughts of suicide or self-harm No   F/U: Safety concerns No     Last PHQ-9 6/8/2022   1.  Little interest or pleasure in doing things 1   2.  Feeling down, depressed, or hopeless 1   3.  Trouble falling or staying asleep, or sleeping too much 1   4.  Feeling tired or having little energy 1   5.  Poor appetite or overeating 1   6.  Feeling bad about yourself 1   7.  Trouble concentrating 1   8.  Moving slowly or restless 1   Q9: Thoughts of better off dead/self-harm past 2 weeks 1   PHQ-9 Total Score 9   Difficulty at work, home, or with people -   In the past two weeks have you had thoughts of suicide or self harm? No   Do you have concerns about your personal safety or the safety of others? No "         No flowsheet data found.      Follow Up      Follow Up Actions Taken  Crisis resource information provided in the After Visit Summary            No follow-ups on file.    Ellen Kelsey MD  [facility]    Josiah Gonzalez is a 56 year old who presents for the following health issues     History of Present Illness       Reason for visit:  Marianne consumes 0 sweetened beverage(s) daily.     Today's PHQ-9         PHQ-9 Total Score: 9    PHQ-9 Q9 Thoughts of better off dead/self-harm past 2 weeks :   Several days  Thoughts of suicide or self harm: (P) No  Self-harm Plan:     Self-harm Action:       Safety concerns for self or others: (P) No    How difficult have these problems made it for you to do your work, take care of things at home, or get along with other people: Somewhat difficult       Depression and Anxiety Follow-Up    How are you doing with your depression since your last visit? No change    How are you doing with your anxiety since your last visit?  No change    Are you having other symptoms that might be associated with depression or anxiety? No    Have you had a significant life event? OTHER: upcoming back surgery      Do you have any concerns with your use of alcohol or other drugs? No    Back:  Has spinal stenosis.  Seeing Dr. Coon at [facility] reccommended decompression surgery at L3-S1.  Seeing PT a the same place and to continue with PT until surgery secheduled.    Weakness and tngling in hands:  Zo wants to do MRI of her neck.  MRI planned for June 16.      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   Feel good pain wise, but Still having balance issues,  PT said gait instability.  Recommending further PT. On level 7 of 12 PT session and still having gait instabiliy.    Depression and anxiety:  Seeing a therapist weekly.  She did a PHQ-9 yesterday and set a BPA alert came on and one or our RN's called her to make sure she was safe.  She denies any suicidal  or homicidal ideation.  Very stressful  job.  They keep calling and e-mailing  her for a big case.  Sleep is her flight from stress. On Lexapro 20 mg daily.    Work leave:  Has been off work through [employer].  Has ongoing instability of right hip and needs to be off another 2 months to continue her PT, through Aug. 15.      Shingle shot today.    Gage next week, wants to ensure it is ok to have the Shingles shot today.      Social History     Tobacco Use     Smoking status: Never Smoker     Smokeless tobacco: Never Used   Substance Use Topics     Alcohol use: Yes     Alcohol/week: 1.0 - 3.0 standard drink     Drug use: No     PHQ 10/4/2021 4/4/2022 6/8/2022   PHQ-9 Total Score 6 14 9   Q9: Thoughts of better off dead/self-harm past 2 weeks Not at all Not at all Several days   F/U: Thoughts of suicide or self-harm - - No   F/U: Safety concerns - - No     HELGA-7 SCORE 8/5/2021 9/2/2021 4/4/2022   Total Score - - 13 (moderate anxiety)   Total Score 18 9 13     Last PHQ-9 6/8/2022   1.  Little interest or pleasure in doing things 1   2.  Feeling down, depressed, or hopeless 1   3.  Trouble falling or staying asleep, or sleeping too much 1   4.  Feeling tired or having little energy 1   5.  Poor appetite or overeating 1   6.  Feeling bad about yourself 1   7.  Trouble concentrating 1   8.  Moving slowly or restless 1   Q9: Thoughts of better off dead/self-harm past 2 weeks 1   PHQ-9 Total Score 9   Difficulty at work, home, or with people -   In the past two weeks have you had thoughts of suicide or self harm? No   Do you have concerns about your personal safety or the safety of others? No     HELGA-7  4/4/2022   1. Feeling nervous, anxious, or on edge 2   2. Not being able to stop or control worrying 2   3. Worrying too much about different things 2   4. Trouble relaxing 2   5. Being so restless that it is hard to sit still 1   6. Becoming easily annoyed or irritable 2   7. Feeling afraid, as if something  awful might happen 2   HELGA-7 Total Score 13   If you checked any problems, how difficult have they made it for you to do your work, take care of things at home, or get along with other people? -             Follow Up Actions Taken             Review of Systems         Objective    /77 (BP Location: Left arm, Patient Position: Sitting, Cuff Size: Adult Large)   Pulse 84   Temp 97.4  F (36.3  C) (Oral)   Resp 16   Wt 124.7 kg (275 lb)   BMI 44.39 kg/m    Body mass index is 44.39 kg/m .  Physical Exam   GENERAL: healthy, alert and no distress

## 2022-06-09 NOTE — PATIENT INSTRUCTIONS
Will fill out FMLA forms to off 6-15-22 through 8-15-22.    Will e-mail FMLA form to Kavon Caballero e-mail to [email or web address]    To see the eye doctor.    When you know when surgery will happen, we need a preop physical within 30 days of your surgery and let us know and we help schedule.    Annual wellness / prevent visit due in Oct.     Shingles shot today.    Continue therapy and Escitalopram at 20 mg daily.

## 2022-07-11 DIAGNOSIS — F43.23 ADJUSTMENT DISORDER WITH MIXED ANXIETY AND DEPRESSED MOOD: ICD-10-CM

## 2022-07-11 DIAGNOSIS — E78.5 HYPERLIPIDEMIA, UNSPECIFIED: ICD-10-CM

## 2022-07-12 RX ORDER — LOVASTATIN 40 MG
TABLET ORAL
Qty: 90 TABLET | Refills: 3 | Status: SHIPPED | OUTPATIENT
Start: 2022-07-12 | End: 2023-09-08

## 2022-07-12 RX ORDER — ESCITALOPRAM OXALATE 20 MG/1
TABLET ORAL
Qty: 90 TABLET | Refills: 3 | Status: SHIPPED | OUTPATIENT
Start: 2022-07-12 | End: 2023-06-09

## 2022-07-12 NOTE — TELEPHONE ENCOUNTER
"Routing refill request to provider for review/approval because:  PHQ9 score - greater than 4.  Early refill requested.    Last Written Prescription Date:  8/5/21  Last Fill Quantity: 90,  # refills: 3   Last office visit provider:  6/9/22    Last Written Prescription Date:  9/23/21  Last Fill Quantity: 90,  # refills: 3   Last office visit provider:  6/9/22     Requested Prescriptions   Pending Prescriptions Disp Refills     escitalopram (LEXAPRO) 20 MG tablet [Pharmacy Med Name: ESCITALOPRAM 20 MG TABLET] 90 tablet 3     Sig: TAKE 1 TABLET BY MOUTH EVERY DAY       SSRIs Protocol Failed - 7/12/2022  7:37 AM        Failed - PHQ-9 score less than 5 in past 6 months     Please review last PHQ-9 score.           Passed - Medication is active on med list        Passed - Patient is age 18 or older        Passed - No active pregnancy on record        Passed - No positive pregnancy test in last 12 months        Passed - Recent (6 mo) or future (30 days) visit within the authorizing provider's specialty     Patient had office visit in the last 6 months or has a visit in the next 30 days with authorizing provider or within the authorizing provider's specialty.  See \"Patient Info\" tab in inbasket, or \"Choose Columns\" in Meds & Orders section of the refill encounter.               lovastatin (MEVACOR) 40 MG tablet [Pharmacy Med Name: LOVASTATIN 40 MG TABLET] 90 tablet 3     Sig: TAKE 1 TABLET BY MOUTH EVERY DAY       Statins Protocol Passed - 7/12/2022  7:37 AM        Passed - LDL on file in past 12 months     Recent Labs   Lab Test 10/04/21  1100   LDL 95             Passed - No abnormal creatine kinase in past 12 months     No lab results found.             Passed - Recent (12 mo) or future (30 days) visit within the authorizing provider's specialty     Patient has had an office visit with the authorizing provider or a provider within the authorizing providers department within the previous 12 mos or has a future within next " "30 days. See \"Patient Info\" tab in inbasket, or \"Choose Columns\" in Meds & Orders section of the refill encounter.              Passed - Medication is active on med list        Passed - Patient is age 18 or older        Passed - No active pregnancy on record        Passed - No positive pregnancy test in past 12 months             Jeffrey Grande RN 07/12/22 7:37 AM  "

## 2022-08-10 ENCOUNTER — TELEPHONE (OUTPATIENT)
Dept: FAMILY MEDICINE | Facility: CLINIC | Age: 57
End: 2022-08-10

## 2022-08-10 NOTE — TELEPHONE ENCOUNTER
Prior Authorization Not Needed per Insurance    Medication: OZEMPIC, 0.25 OR 0.5 MG/DOSE, 2 MG/1.5ML SOPN pen  Insurance Company: [employer] - Phone [phone] Fax [phone]  Expected CoPay:      Pharmacy Filling the Rx: [id number] - [place] [id number]  Pharmacy Notified: Yes  Patient Notified: No    An active PA is already on file with expiration date of 08/09/2023. Pharmacy received paid claim, no further PA needed.

## 2022-08-11 ENCOUNTER — TRANSFERRED RECORDS (OUTPATIENT)
Dept: HEALTH INFORMATION MANAGEMENT | Facility: CLINIC | Age: 57
End: 2022-08-11

## 2022-08-22 ENCOUNTER — OFFICE VISIT (OUTPATIENT)
Dept: FAMILY MEDICINE | Facility: CLINIC | Age: 57
End: 2022-08-22
Payer: COMMERCIAL

## 2022-08-22 VITALS
TEMPERATURE: 95.7 F | SYSTOLIC BLOOD PRESSURE: 131 MMHG | HEART RATE: 99 BPM | DIASTOLIC BLOOD PRESSURE: 75 MMHG | RESPIRATION RATE: 16 BRPM | WEIGHT: 279.13 LBS | BODY MASS INDEX: 43.81 KG/M2 | HEIGHT: 67 IN

## 2022-08-22 DIAGNOSIS — Z00.00 ENCOUNTER FOR PREVENTATIVE ADULT HEALTH CARE EXAMINATION: ICD-10-CM

## 2022-08-22 DIAGNOSIS — E55.9 VITAMIN D DEFICIENCY: ICD-10-CM

## 2022-08-22 DIAGNOSIS — E78.5 HYPERLIPIDEMIA LDL GOAL <100: ICD-10-CM

## 2022-08-22 DIAGNOSIS — M51.369 DDD (DEGENERATIVE DISC DISEASE), LUMBAR: ICD-10-CM

## 2022-08-22 DIAGNOSIS — E66.9 DIABETES MELLITUS TYPE 2 IN OBESE: Primary | ICD-10-CM

## 2022-08-22 DIAGNOSIS — E03.9 ACQUIRED HYPOTHYROIDISM: ICD-10-CM

## 2022-08-22 DIAGNOSIS — E11.69 DIABETES MELLITUS TYPE 2 IN OBESE: Primary | ICD-10-CM

## 2022-08-22 DIAGNOSIS — N85.02 ENDOMETRIAL HYPERPLASIA WITH ATYPIA: ICD-10-CM

## 2022-08-22 DIAGNOSIS — F43.23 ADJUSTMENT DISORDER WITH MIXED ANXIETY AND DEPRESSED MOOD: ICD-10-CM

## 2022-08-22 DIAGNOSIS — E66.01 MORBID OBESITY (H): ICD-10-CM

## 2022-08-22 LAB
ALBUMIN SERPL BCG-MCNC: 4.6 G/DL (ref 3.5–5.2)
ALP SERPL-CCNC: 76 U/L (ref 35–104)
ALT SERPL W P-5'-P-CCNC: 26 U/L (ref 10–35)
ANION GAP SERPL CALCULATED.3IONS-SCNC: 11 MMOL/L (ref 7–15)
AST SERPL W P-5'-P-CCNC: 26 U/L (ref 10–35)
BILIRUB SERPL-MCNC: 0.5 MG/DL
BUN SERPL-MCNC: 17.9 MG/DL (ref 6–20)
CALCIUM SERPL-MCNC: 9.8 MG/DL (ref 8.6–10)
CHLORIDE SERPL-SCNC: 105 MMOL/L (ref 98–107)
CHOLEST SERPL-MCNC: 197 MG/DL
CREAT SERPL-MCNC: 0.94 MG/DL (ref 0.51–0.95)
DEPRECATED HCO3 PLAS-SCNC: 24 MMOL/L (ref 22–29)
ERYTHROCYTE [DISTWIDTH] IN BLOOD BY AUTOMATED COUNT: 12.7 % (ref 10–15)
GFR SERPL CREATININE-BSD FRML MDRD: 71 ML/MIN/1.73M2
GLUCOSE SERPL-MCNC: 102 MG/DL (ref 70–99)
HBA1C MFR BLD: 5.6 % (ref 0–5.6)
HCT VFR BLD AUTO: 42.6 % (ref 35–47)
HDLC SERPL-MCNC: 58 MG/DL
HGB BLD-MCNC: 13.9 G/DL (ref 11.7–15.7)
LDLC SERPL CALC-MCNC: 114 MG/DL
MCH RBC QN AUTO: 28.9 PG (ref 26.5–33)
MCHC RBC AUTO-ENTMCNC: 32.6 G/DL (ref 31.5–36.5)
MCV RBC AUTO: 89 FL (ref 78–100)
NONHDLC SERPL-MCNC: 139 MG/DL
PLATELET # BLD AUTO: 287 10E3/UL (ref 150–450)
POTASSIUM SERPL-SCNC: 4.1 MMOL/L (ref 3.4–5.3)
PROT SERPL-MCNC: 7.4 G/DL (ref 6.4–8.3)
RBC # BLD AUTO: 4.81 10E6/UL (ref 3.8–5.2)
SODIUM SERPL-SCNC: 140 MMOL/L (ref 136–145)
TRIGL SERPL-MCNC: 123 MG/DL
TSH SERPL DL<=0.005 MIU/L-ACNC: 0.66 UIU/ML (ref 0.3–4.2)
WBC # BLD AUTO: 6.7 10E3/UL (ref 4–11)

## 2022-08-22 PROCEDURE — 80061 LIPID PANEL: CPT | Performed by: FAMILY MEDICINE

## 2022-08-22 PROCEDURE — 36415 COLL VENOUS BLD VENIPUNCTURE: CPT | Performed by: FAMILY MEDICINE

## 2022-08-22 PROCEDURE — 85027 COMPLETE CBC AUTOMATED: CPT | Performed by: FAMILY MEDICINE

## 2022-08-22 PROCEDURE — 99396 PREV VISIT EST AGE 40-64: CPT | Performed by: FAMILY MEDICINE

## 2022-08-22 PROCEDURE — 82306 VITAMIN D 25 HYDROXY: CPT | Performed by: FAMILY MEDICINE

## 2022-08-22 PROCEDURE — 84443 ASSAY THYROID STIM HORMONE: CPT | Performed by: FAMILY MEDICINE

## 2022-08-22 PROCEDURE — 83036 HEMOGLOBIN GLYCOSYLATED A1C: CPT | Performed by: FAMILY MEDICINE

## 2022-08-22 PROCEDURE — 80053 COMPREHEN METABOLIC PANEL: CPT | Performed by: FAMILY MEDICINE

## 2022-08-22 ASSESSMENT — ENCOUNTER SYMPTOMS
DIZZINESS: 0
DYSURIA: 0
DIARRHEA: 0
HEARTBURN: 0
PALPITATIONS: 0
BREAST MASS: 0
JOINT SWELLING: 0
HEMATOCHEZIA: 0
FREQUENCY: 1
PARESTHESIAS: 0
ABDOMINAL PAIN: 0
ARTHRALGIAS: 1
EYE PAIN: 0
COUGH: 0
HEADACHES: 1
CONSTIPATION: 0
HEMATURIA: 0
MYALGIAS: 1
WEAKNESS: 0
SHORTNESS OF BREATH: 0
CHILLS: 0
NERVOUS/ANXIOUS: 1
FEVER: 0
SORE THROAT: 0

## 2022-08-22 NOTE — PROGRESS NOTES
"   SUBJECTIVE:   CC: Aby Giraldo is an 57 year old woman who presents for preventive health visit.       Patient has been advised of split billing requirements and indicates understanding: Yes  Healthy Habits:     Getting at least 3 servings of Calcium per day:  Yes    Bi-annual eye exam:  Yes    Dental care twice a year:  Yes    Sleep apnea or symptoms of sleep apnea:  None    Diet:  Regular (no restrictions)    Frequency of exercise:  2-3 days/week    Duration of exercise:  Less than 15 minutes    Taking medications regularly:  Yes    Medication side effects:  None    PHQ-2 Total Score: 1    Additional concerns today:  Yes    Diabetes: No chest pain or shortness of breath.  A1c has been under good control.    Spine: She has spinal stenosis and lumbar degenerative disc disease.  She had an Incident at end of June.  Lifted [employer] bag off counter and turned and back pain more proounced in back and radiates down right leg.  Feel like an \"electric eel\" sensation going down the right leg.  Numbness and tingling in both feet, more on righ  They are working.  The spine clinic is working on getting a surgery improved approved from her insurance and then she will be setting that up shortly after that.    Hip pain: Bilat hip replacemtn. Done PT. Hs a cane an walker.    Obesity: She is following with the weight loss clinic and is on Ozempic.  It is really helping her lose weight.  That also has improved her diabetes.    Depression:  Feels like she is getting better.  Therapist took a leave of absence.  She does not feel she needs a new therapist at this point.    Work:  She will be changing to long term disabilty.  If the disability will be related to her spine or spine surgery the spine clinic will fill out the appropriate paperwork.            Health Maintenance   Topic Date Due     EYE EXAM  Done June 2022     INFLUENZA VACCINE (1) 09/01/2022     PREVENTIVE CARE VISIT  Today     A1C  Today     LIPID  Today     " MICROALBUMIN  10/04/2022, will do with preop     DIABETIC FOOT EXAM  Today     ANNUAL REVIEW OF HM ORDERS  Today     BMP  Today     Pneumococcal Vaccine: Pediatrics (0 to 5 Years) and At-Risk Patients (6 to 64 Years) (2 - PCV) Up to date     MAMMO SCREENING  Patient will set     HPV TEST  Due 2024 per GYN     ADVANCE CARE PLANNING  We would like a copy please     PAP  Due 2024 per GYN     COLORECTAL CANCER SCREENING  10/21/2024     DTAP/TDAP/TD IMMUNIZATION (3 - Td or Tdap) 11/23/2031     HEPATITIS C SCREENING  Completed     PHQ-2 (once per calendar year)  Completed     ZOSTER IMMUNIZATION  Completed     HEPATITIS B IMMUNIZATION  Completed     COVID-19 Vaccine  Completed     IPV IMMUNIZATION  Aged Out     MENINGITIS IMMUNIZATION  Aged Out     HIV SCREENING  Discontinued                 Today's PHQ-2 Score:   PHQ-2 ( 1999 Pf[employer]) 8/22/2022   Q1: Little interest or pleasure in doing things 0   Q2: Feeling down, depressed or hopeless 1   PHQ-2 Score 1   PHQ-2 Total Score (12-17 Years)- Positive if 3 or more points; Administer PHQ-A if positive -   Q1: Little interest or pleasure in doing things Not at all   Q2: Feeling down, depressed or hopeless Several days   PHQ-2 Score 1       Abuse: Current or Past (Physical, Sexual or Emotional) - No  Do you feel safe in your environment? Yes        Social History     Tobacco Use     Smoking status: Never Smoker     Smokeless tobacco: Never Used   Substance Use Topics     Alcohol use: Yes     Alcohol/week: 1.0 - 3.0 standard drink         Alcohol Use 8/22/2022   Prescreen: >3 drinks/day or >7 drinks/week? No       Reviewed orders with patient.  Reviewed health maintenance and updated orders accordingly - Yes  Lab work is in process    Breast Cancer Screening:    FHS-7:   Breast CA Risk Assessment (FHS-7) 8/18/2021   Did any of your first-degree relatives have breast or ovarian cancer? No   Did any of your relatives have bilateral breast cancer? No   Did any man in your family  have breast cancer? No   Did any woman in your family have breast and ovarian cancer? No   Did any woman in your family have breast cancer before age 50 y? No   Do you have 2 or more relatives with breast and/or ovarian cancer? No   Do you have 2 or more relatives with breast and/or bowel cancer? No       Mammogram Screening: Recommended mammography every 1-2 years with patient discussion and risk factor consideration  Pertinent mammograms are reviewed under the imaging tab.    History of abnormal Pap smear: saw GYN  PAP / HPV Latest Ref Rng & Units 8/8/2019   PAP Negative for squamous intraepithelial lesion or malignancy. Negative for squamous intraepithelial lesion or malignancy  Electronically signed by Jocelynn Solis CT (ASCP) on 8/15/2019 at  8:58 AM     HPV16 NEG Negative   HPV18 NEG Negative   HRHPV NEG Negative     Reviewed and updated as needed this visit by clinical staff   Tobacco  Allergies  Meds                Reviewed and updated as needed this visit by Provider                       Review of Systems   Constitutional: Negative for chills and fever.   HENT: Positive for ear pain. Negative for congestion, hearing loss and sore throat.    Eyes: Negative for pain and visual disturbance.   Respiratory: Negative for cough and shortness of breath.    Cardiovascular: Negative for chest pain, palpitations and peripheral edema.   Gastrointestinal: Negative for abdominal pain, constipation, diarrhea, heartburn and hematochezia.   Breasts:  Negative for tenderness, breast mass and discharge.   Genitourinary: Positive for frequency and urgency. Negative for dysuria, genital sores, hematuria, pelvic pain, vaginal bleeding and vaginal discharge.   Musculoskeletal: Positive for arthralgias and myalgias. Negative for joint swelling.   Skin: Negative for rash.   Neurological: Positive for headaches. Negative for dizziness, weakness and paresthesias.   Psychiatric/Behavioral: Negative for mood changes. The  "patient is nervous/anxious.      CONSTITUTIONAL: NEGATIVE for fever, chills, change in weight  INTEGUMENTARY/SKIN: NEGATIVE for worrisome rashes, moles or lesions  EYES: NEGATIVE for vision changes or irritation  ENT: NEGATIVE for ear, mouth and throat problems  RESP: NEGATIVE for significant cough or SOB  BREAST: NEGATIVE for masses, tenderness or discharge  CV: NEGATIVE for chest pain, palpitations or peripheral edema  GI: NEGATIVE for nausea, abdominal pain, heartburn, or change in bowel habits  : NEGATIVE for unusual urinary or vaginal symptoms. No vaginal bleeding.  MUSCULOSKELETAL: NEGATIVE for significant arthralgias or myalgia  NEURO: NEGATIVE for weakness, dizziness or paresthesias  PSYCHIATRIC: NEGATIVE for changes in mood or affect      OBJECTIVE:   /75 (BP Location: Left arm, Patient Position: Sitting, Cuff Size: Adult Regular)   Pulse 99   Temp (!) 95.7  F (35.4  C) (Oral)   Resp 16   Ht 1.702 m (5' 7\")   Wt 126.6 kg (279 lb 2 oz)   BMI 43.72 kg/m    Physical Exam  GENERAL: healthy, alert and no distress  EYES: Eyes grossly normal to inspection, PERRL and conjunctivae and sclerae normal  HENT: ear canals and TM's normal, nose and mouth without ulcers or lesions  NECK: no adenopathy, no asymmetry, masses, or scars and thyroid normal to palpation  RESP: lungs clear to auscultation - no rales, rhonchi or wheezes  BREAST: normal without masses, tenderness or nipple discharge and no palpable axillary masses or adenopathy  CV: regular rate and rhythm, normal S1 S2, no S3 or S4, no murmur, click or rub, no peripheral edema and peripheral pulses strong  ABDOMEN: soft, nontender, no hepatosplenomegaly, no masses and bowel sounds normal  MS: no gross musculoskeletal defects noted, no edema  SKIN: no suspicious lesions or rashes  NEURO: Normal strength and tone, mentation intact and speech normal, except absent deep tendon reflexes right patella, 2+ left patella, positive straight leg raise on the " "right  PSYCH: mentation appears normal, affect normal/bright    Diagnostic Test Results:  Labs reviewed in Epic    ASSESSMENT/PLAN:       ICD-10-CM    1. Diabetes mellitus type 2 in obese (H)  E11.69 CBC with platelets    E66.9 Hemoglobin A1c     CBC with platelets     Hemoglobin A1c   2. Encounter for preventative adult health care examination  Z00.00    3. Endometrial hyperplasia with atypia  N85.02    4. Hyperlipidemia LDL goal <100  E78.5 Lipid Profile     Comprehensive metabolic panel (BMP + Alb, Alk Phos, ALT, AST, Total. Bili, TP)     Lipid Profile     Comprehensive metabolic panel (BMP + Alb, Alk Phos, ALT, AST, Total. Bili, TP)   5. Acquired hypothyroidism  E03.9 TSH with free T4 reflex     TSH with free T4 reflex   6. Morbid Obesity  E66.01    7. Vitamin D deficiency  E55.9 Vitamin D Deficiency     Vitamin D Deficiency   8. Adjustment disorder with mixed anxiety and depressed mood  F43.23    9. DDD (degenerative disc disease), lumbar  M51.36      Following with the spine clinic.  That is for spinal stenosis and radiculopathy down the right leg.      We do know when your surgery is set let us know and we will help you set up preop within 30 days of your surgery.    Following with orthopedic surgery for hip arthritis and status post bilateral hip replacements.    Patient has been advised of split billing requirements and indicates understanding: Yes    COUNSELING:  Reviewed preventive health counseling, as reflected in patient instructions       Regular exercise       Healthy diet/nutrition    Estimated body mass index is 43.72 kg/m  as calculated from the following:    Height as of this encounter: 1.702 m (5' 7\").    Weight as of this encounter: 126.6 kg (279 lb 2 oz).    Weight management plan: Discussed healthy diet and exercise guidelines    She reports that she has never smoked. She has never used smokeless tobacco.      Counseling Resources:  ATP IV Guidelines  Pooled Cohorts Equation " Calculator  Breast Cancer Risk Calculator  BRCA-Related Cancer Risk Assessment: FHS-7 Tool  FRAX Risk Assessment  ICSI Preventive Guidelines  Dietary Guidelines for Americans, 2010  USDA's MyPlate  ASA Prophylaxis  Lung CA Screening    Ellen Kelsey MD  [facility]

## 2022-08-22 NOTE — PATIENT INSTRUCTIONS
Following with the spine clinic.  That is for spinal stenosis and radiculopathy down the right leg.      We do know when your surgery is set let us know and we will help you set up preop within 30 days of your surgery.    Following with orthopedic surgery for hip arthritis and status post bilateral hip replacements.      Health Maintenance   Topic Date Due    EYE EXAM  Done June 2022    INFLUENZA VACCINE (1) 09/01/2022    PREVENTIVE CARE VISIT  Today    A1C  Today    LIPID  Today    MICROALBUMIN  10/04/2022, will do with preop    DIABETIC FOOT EXAM  Today    ANNUAL REVIEW OF HM ORDERS  Today    BMP  Today    Pneumococcal Vaccine: Pediatrics (0 to 5 Years) and At-Risk Patients (6 to 64 Years) (2 - PCV) Up to date    MAMMO SCREENING  Patient will set    HPV TEST  Due 2024 per GYN    ADVANCE CARE PLANNING  We would like a copy please    PAP  Due 2024 per GYN    COLORECTAL CANCER SCREENING  10/21/2024    DTAP/TDAP/TD IMMUNIZATION (3 - Td or Tdap) 11/23/2031    HEPATITIS C SCREENING  Completed    PHQ-2 (once per calendar year)  Completed    ZOSTER IMMUNIZATION  Completed    HEPATITIS B IMMUNIZATION  Completed    COVID-19 Vaccine  Completed    IPV IMMUNIZATION  Aged Out    MENINGITIS IMMUNIZATION  Aged Out    HIV SCREENING  Discontinued

## 2022-08-23 LAB — DEPRECATED CALCIDIOL+CALCIFEROL SERPL-MC: 67 UG/L (ref 20–75)

## 2022-09-13 ENCOUNTER — OFFICE VISIT (OUTPATIENT)
Dept: FAMILY MEDICINE | Facility: CLINIC | Age: 57
End: 2022-09-13
Payer: COMMERCIAL

## 2022-09-13 VITALS
SYSTOLIC BLOOD PRESSURE: 122 MMHG | DIASTOLIC BLOOD PRESSURE: 82 MMHG | HEIGHT: 67 IN | BODY MASS INDEX: 44.45 KG/M2 | WEIGHT: 283.2 LBS | HEART RATE: 96 BPM | RESPIRATION RATE: 16 BRPM

## 2022-09-13 DIAGNOSIS — M51.369 DDD (DEGENERATIVE DISC DISEASE), LUMBAR: ICD-10-CM

## 2022-09-13 DIAGNOSIS — E78.5 HYPERLIPIDEMIA LDL GOAL <100: ICD-10-CM

## 2022-09-13 DIAGNOSIS — F43.23 ADJUSTMENT DISORDER WITH MIXED ANXIETY AND DEPRESSED MOOD: ICD-10-CM

## 2022-09-13 DIAGNOSIS — E11.69 DIABETES MELLITUS TYPE 2 IN OBESE: ICD-10-CM

## 2022-09-13 DIAGNOSIS — Z01.818 PREOP GENERAL PHYSICAL EXAM: Primary | ICD-10-CM

## 2022-09-13 DIAGNOSIS — Z23 NEED FOR PROPHYLACTIC VACCINATION AND INOCULATION AGAINST INFLUENZA: ICD-10-CM

## 2022-09-13 DIAGNOSIS — E03.9 ACQUIRED HYPOTHYROIDISM: ICD-10-CM

## 2022-09-13 DIAGNOSIS — E66.01 MORBID OBESITY (H): ICD-10-CM

## 2022-09-13 DIAGNOSIS — E66.9 DIABETES MELLITUS TYPE 2 IN OBESE: ICD-10-CM

## 2022-09-13 PROCEDURE — 99214 OFFICE O/P EST MOD 30 MIN: CPT | Mod: 25 | Performed by: FAMILY MEDICINE

## 2022-09-13 PROCEDURE — 90471 IMMUNIZATION ADMIN: CPT | Performed by: FAMILY MEDICINE

## 2022-09-13 PROCEDURE — 90682 RIV4 VACC RECOMBINANT DNA IM: CPT | Performed by: FAMILY MEDICINE

## 2022-09-13 NOTE — PATIENT INSTRUCTIONS
Preparing for Your Surgery  Getting started  A nurse will call you to review your health history and instructions. They will give you an arrival time based on your scheduled surgery time. Please be ready to share:    Your doctor's clinic name and phone number    Your medical, surgical and anesthesia history    A list of allergies and sensitivities    A list of medicines, including herbal treatments and over-the-counter drugs    Whether the patient has a legal guardian (ask how to send us the papers in advance)  Please tell us if you're pregnant--or if there's any chance you might be pregnant. Some surgeries may injure a fetus (unborn baby), so they require a pregnancy test. Surgeries that are safe for a fetus don't always need a test, and you can choose whether to have one.   If you have a child who's having surgery, please ask for a copy of Preparing for Your Child's Surgery.    Preparing for surgery    Within 30 days of surgery: Have a pre-op exam (sometimes called an H&P, or History and Physical). This can be done at a clinic or pre-operative center.  ? If you're having a , you may not need this exam. Talk to your care team.    At your pre-op exam, talk to your care team about all medicines you take. If you need to stop any medicines before surgery, ask when to start taking them again.  ? We do this for your safety. Many medicines can make you bleed too much during surgery. Some change how well surgery (anesthesia) drugs work.    Call your insurance company to let them know you're having surgery. (If you don't have insurance, call [phone].)    Call your clinic if there's any change in your health. This includes signs of a cold or flu (sore throat, runny nose, cough, rash, fever). It also includes a scrape or scratch near the surgery site.    If you have questions on the day of surgery, call your hospital or surgery center.  COVID testing  You may need to be tested for COVID-19 before having  surgery. If so, we will give you instructions.  Eating and drinking guidelines  For your safety: Unless your surgeon tells you otherwise, follow the guidelines below.    Eat and drink as usual until 8 hours before surgery. After that, no food or milk.    Drink clear liquids until 2 hours before surgery. These are liquids you can see through, like water, Gatorade and Propel Water. You may also have black coffee and tea (no cream or milk).    Nothing by mouth within 2 hours of surgery. This includes gum, candy and breath mints.    If you drink alcohol: Stop drinking it the night before surgery.    If your care team tells you to take medicine on the morning of surgery, it's okay to take it with a sip of water.  Preventing infection    Shower or bathe the night before and morning of your surgery. Follow the instructions your clinic gave you. (If no instructions, use regular soap.)    Don't shave or clip hair near your surgery site. We'll remove the hair if needed.    Don't smoke or vape the morning of surgery. You may chew nicotine gum up to 2 hours before surgery. A nicotine patch is okay.  ? Note: Some surgeries require you to completely quit smoking and nicotine. Check with your surgeon.    Your care team will make every effort to keep you safe from infection. We will:  ? Clean our hands often with soap and water (or an alcohol-based hand rub).  ? Clean the skin at your surgery site with a special soap that kills germs.  ? Give you a special gown to keep you warm. (Cold raises the risk of infection.)  ? Wear special hair covers, masks, gowns and gloves during surgery.  ? Give antibiotic medicine, if prescribed. Not all surgeries need antibiotics.  What to bring on the day of surgery    Photo ID and insurance card    Copy of your health care directive, if you have one    Glasses and hearing aides (bring cases)  ? You can't wear contacts during surgery    Inhaler and eye drops, if you use them (tell us about these when  you arrive)    CPAP machine or breathing device, if you use them    A few personal items, if spending the night    If you have . . .  ? A pacemaker, ICD (cardiac defibrillator) or other implant: Bring the ID card.  ? An implanted stimulator: Bring the remote control.  ? A legal guardian: Bring a copy of the certified (court-stamped) guardianship papers.  Please remove any jewelry, including body piercings. Leave jewelry and other valuables at home.  If you're going home the day of surgery    You must have a responsible adult drive you home. They should stay with you overnight as well.    If you don't have someone to stay with you, and you aren't safe to go home alone, we may keep you overnight. Insurance often won't pay for this.  After surgery  If it's hard to control your pain or you need more pain medicine, please call your surgeon's office.  Questions?   If you have any questions for your care team, list them here: _________________________________________________________________________________________________________________________________________________________________________ ____________________________________ ____________________________________ ____________________________________  For informational purposes only. Not to replace the advice of your health care provider. Copyright   2003, 2019 [facility]. All rights reserved. Clinically reviewed by Renetta iGlliland MD. [employer] [id number] - REV 07/21.

## 2022-09-13 NOTE — PROGRESS NOTES
[facility]  [address] [facility] [address]  Phone: [phone]  Fax: [phone]  Primary Provider: Ellen Kelsey  Pre-op Performing Provider: CABRERA CORNEJO    {Provider  Link to PREOP SmartSet  Use this to apply standard patient instructions to AVS; includes medication directions, common orders, guidelines for anemia, warfarin, additional testing   :[id number]}  PREOPERATIVE EVALUATION:  Today's date: 9/13/2022    Aby Giraldo is a 57 year old female who presents for a preoperative evaluation.    Surgical Information:  Surgery/Procedure: Bilateral medial facetectomy and decompression L3/4 - L4/5 - L5/S1  Surgery Location: [place]  Surgeon: Dr. Bravo Coon  Surgery Date: 9/21/22  Time of Surgery: TBD  Where patient plans to recover: At home with family  Fax number for surgical facility: [phone] + [phone]    Type of Anesthesia Anticipated: General    1. Preop general physical exam  Aby is approved for surgery with general anesthesia.  She is had normal blood work within the last 30 days of her surgery.  We set up her COVID test to be done 3 to 5 days before her procedure.  - Asymptomatic COVID-19 Virus (Coronavirus) by PCR; Future    2. DDD (degenerative disc disease), lumbar    3. Need for prophylactic vaccination and inoculation against influenza  Updated flu shot today  - INFLUENZA QUAD, RECOMBINANT, P-FREE (RIV4) (FLUBLOK)    4. Diabetes mellitus type 2 in obese (H)  Well-controlled with current medication, most recent A1c equals 5.6.    5. Acquired hypothyroidism  Stable on her current dose of Synthroid    6. Morbid Obesity  BMI is 44.3  Increase risk for surgical complications    7. Adjustment disorder with mixed anxiety and depressed mood  Stable on current medication    8. Hyperlipidemia LDL goal <100  Stable on her statin.      Subjective     HPI related to upcoming procedure: Aby presents for her preop physical.  She is having surgery on her  lumbar spine.  She was just in for her annual exam with her primary care provider.  Her type 2 diabetes, hypothyroidism, hyperlipidemia, hypertension, and anxiety are under excellent control with her current medications.  She has no known coronary artery disease and denies chest pain and shortness of breath.  She has had no recent illnesses or exposures.    Preop Questions 9/6/2022   1. Have you ever had a heart attack or stroke? No   2. Have you ever had surgery on your heart or blood vessels, such as a stent placement, a coronary artery bypass, or surgery on an artery in your head, neck, heart, or legs? No   3. Do you have chest pain with activity? No   4. Do you have a history of  heart failure? No   5. Do you currently have a cold, bronchitis or symptoms of other infection? No   6. Do you have a cough, shortness of breath, or wheezing? No   7. Do you or anyone in your family have previous history of blood clots? YES -sister with DVT and PEs after being on OCPs   8. Do you or does anyone in your family have a serious bleeding problem such as prolonged bleeding following surgeries or cuts? No   9. Have you ever had problems with anemia or been told to take iron pills? No   10. Have you had any abnormal blood loss such as black, tarry or bloody stools, or abnormal vaginal bleeding? No   11. Have you ever had a blood transfusion? No   12. Are you willing to have a blood transfusion if it is medically needed before, during, or after your surgery? Yes   13. Have you or any of your relatives ever had problems with anesthesia? No   14. Do you have sleep apnea, excessive snoring or daytime drowsiness? No   15. Do you have any artifical heart valves or other implanted medical devices like a pacemaker, defibrillator, or continuous glucose monitor? No   16. Do you have artificial joints? YES -bilateral hips   17. Are you allergic to latex? No   18. Is there any chance that you may be pregnant? No       Health Care  Directive:  Patient does not have a Health Care Directive or Living Will: Patient states has Advance Directive and will bring in a copy to clinic.    Preoperative Review of :   reviewed - no record of controlled substances prescribed.      Status of Chronic Conditions:  See problem list for active medical problems.  Problems all longstanding and stable, except as noted/documented.  See ROS for pertinent symptoms related to these conditions.      Review of Systems  CONSTITUTIONAL: NEGATIVE for fever, chills, change in weight  INTEGUMENTARY/SKIN: NEGATIVE for worrisome rashes, moles or lesions  EYES: NEGATIVE for vision changes or irritation  ENT/MOUTH: NEGATIVE for ear, mouth and throat problems  RESP: NEGATIVE for significant cough or SOB  CV: NEGATIVE for chest pain, palpitations or peripheral edema  GI: NEGATIVE for nausea, abdominal pain, heartburn, or change in bowel habits  : NEGATIVE for frequency, dysuria, or hematuria  MUSCULOSKELETAL: NEGATIVE for significant arthralgias or myalgia  NEURO: NEGATIVE for weakness, dizziness or paresthesias  ENDOCRINE: NEGATIVE for temperature intolerance, skin/hair changes  HEME: NEGATIVE for bleeding problems  PSYCHIATRIC: NEGATIVE for changes in mood or affect    Patient Active Problem List    Diagnosis Date Noted     Spinal stenosis of lumbar region 10/04/2021     Priority: Medium     Spondylolisthesis of lumbar region 10/04/2021     Priority: Medium     Hyperlipidemia LDL goal <100 09/23/2021     Priority: Medium     Adjustment disorder with mixed anxiety and depressed mood 09/02/2021     Priority: Medium     Endometrial hyperplasia with atypia 10/21/2020     Priority: Medium     Osteoarthritis of both hips 10/21/2020     Priority: Medium     DDD (degenerative disc disease), lumbar 10/21/2020     Priority: Medium     Diabetes mellitus type 2 in obese (H) 03/02/2018     Priority: Medium     Hypothyroidism      Priority: Medium     Created by Conversion  Replacement  Utility updated for latest IMO load      Formatting of this note might be different from the original.  Created by Conversion    Replacement Utility updated for latest IMO load       Morbid Obesity      Priority: Medium     Created by Conversion  [facility] Annotation: Jun 1 2007  6:29PM - Marlee Golden: BMI=44 6/1/07         Allergies      Priority: Medium     Created by [facility] Annotation: Jun 1 2007  6:29PM - Marlee Golden:   seasonalchocolate/strawberriestobacco          Past Medical History:   Diagnosis Date     Abdominal pain      Blood in urine      Diabetes mellitus type 2 in obese (H)      Fatty liver      HLD (hyperlipidemia)      Hypothyroidism      Morbid obesity (H)      Recurrent sinus infections      Seasonal allergies      Sleep apnea in adult     Does not use CPAP     Verruca warts (infectious)      Past Surgical History:   Procedure Laterality Date     CHOLECYSTECTOMY       HC REMOVAL GALLBLADDER      Description: Cholecystectomy;  Proc Date: 03/01/2010;     MI HYSTEROSCOPY,W/ENDO BX N/A 10/28/2020    Procedure: HYSTEROSCOPY, DILATION AND CURETTAGE, POLYPECTOMY;  Surgeon: Kanika Gonzalez DO;  Location: [facility];  Service: Gynecology     TOTAL HIP ARTHROPLASTY Left 12/09/2021     TOTAL HIP ARTHROPLASTY Right 02/2022     Current Outpatient Medications   Medication Sig Dispense Refill     aspirin 81 MG EC tablet [ASPIRIN 81 MG EC TABLET] Take 1 tablet (81 mg total) by mouth daily. 150 tablet 2     escitalopram (LEXAPRO) 20 MG tablet TAKE 1 TABLET BY MOUTH EVERY DAY 90 tablet 3     fexofenadine (ALLEGRA) 60 MG tablet [FEXOFENADINE (ALLEGRA) 60 MG TABLET] Take 60 mg by mouth daily.       levothyroxine (SYNTHROID/LEVOTHROID) 112 MCG tablet TAKE 1 TABLET BY MOUTH DAILY 90 tablet 3     lovastatin (MEVACOR) 40 MG tablet TAKE 1 TABLET BY MOUTH EVERY DAY 90 tablet 3     OZEMPIC, 0.25 OR 0.5 MG/DOSE, 2 MG/1.5ML SOPN pen INJECT 0.5 MG SUBCUTANEOUS EVERY 7 DAYS 1.5 mL 4     pen  "needle, diabetic (NOVOFINE 32) 32 gauge x 1/4\" Ndle [PEN NEEDLE, DIABETIC (NOVOFINE 32) 32 GAUGE X 1/4\" NDLE] For use w/ ozempic. 50 each 0     vitamin D3 (CHOLECALCIFEROL) 50 mcg (2000 units) tablet Take 1 tablet by mouth daily         Allergies   Allergen Reactions     Simvastatin Diarrhea        Social History     Tobacco Use     Smoking status: Never Smoker     Smokeless tobacco: Never Used   Substance Use Topics     Alcohol use: Yes     Alcohol/week: 1.0 - 3.0 standard drink       History   Drug Use No         Objective     /82   Pulse 96   Resp 16   Ht 1.702 m (5' 7\")   Wt 128.5 kg (283 lb 3.2 oz)   LMP  (LMP Unknown)   BMI 44.36 kg/m      Physical Exam    GENERAL APPEARANCE: healthy, alert and no distress     EYES: EOMI, PERRL     HENT: ear canals and TM's normal and nose and mouth without ulcers or lesions     NECK: no adenopathy, no asymmetry, masses, or scars and thyroid normal to palpation     RESP: lungs clear to auscultation - no rales, rhonchi or wheezes     CV: regular rates and rhythm, normal S1 S2, no S3 or S4 and no murmur, click or rub     MS: extremities normal- no gross deformities noted, no evidence of inflammation in joints, FROM in all extremities.     SKIN: no suspicious lesions or rashes     NEURO: Normal strength and tone, sensory exam grossly normal, mentation intact and speech normal     PSYCH: mentation appears normal. and affect normal/bright     LYMPHATICS: No cervical adenopathy    Recent Labs   Lab Test 08/22/22  1115 04/04/22  1539 02/07/22  1015 02/02/22  1038   HGB 13.9  --  14.1 13.8     --  303  --      --   --  141   POTASSIUM 4.1  --   --  4.8   CR 0.94  --   --  0.87   A1C 5.6 5.2  --   --         Diagnostics:  Recent Results (from the past 720 hour(s))   Lipid Profile    Collection Time: 08/22/22 11:15 AM   Result Value Ref Range    Cholesterol 197 <200 mg/dL    Triglycerides 123 <150 mg/dL    Direct Measure HDL 58 >=50 mg/dL    LDL Cholesterol " Calculated 114 (H) <=100 mg/dL    Non HDL Cholesterol 139 (H) <130 mg/dL   Vitamin D Deficiency    Collection Time: 08/22/22 11:15 AM   Result Value Ref Range    Vitamin D, Total (25-Hydroxy) 67 20 - 75 ug/L   TSH with free T4 reflex    Collection Time: 08/22/22 11:15 AM   Result Value Ref Range    TSH 0.66 0.30 - 4.20 uIU/mL   CBC with platelets    Collection Time: 08/22/22 11:15 AM   Result Value Ref Range    WBC Count 6.7 4.0 - 11.0 10e3/uL    RBC Count 4.81 3.80 - 5.20 10e6/uL    Hemoglobin 13.9 11.7 - 15.7 g/dL    Hematocrit 42.6 35.0 - 47.0 %    MCV 89 78 - 100 fL    MCH 28.9 26.5 - 33.0 pg    MCHC 32.6 31.5 - 36.5 g/dL    RDW 12.7 10.0 - 15.0 %    Platelet Count 287 150 - 450 10e3/uL   Hemoglobin A1c    Collection Time: 08/22/22 11:15 AM   Result Value Ref Range    Hemoglobin A1C 5.6 0.0 - 5.6 %   Comprehensive metabolic panel (BMP + Alb, Alk Phos, ALT, AST, Total. Bili, TP)    Collection Time: 08/22/22 11:15 AM   Result Value Ref Range    Sodium 140 136 - 145 mmol/L    Potassium 4.1 3.4 - 5.3 mmol/L    Creatinine 0.94 0.51 - 0.95 mg/dL    Urea Nitrogen 17.9 6.0 - 20.0 mg/dL    Chloride 105 98 - 107 mmol/L    Carbon Dioxide (CO2) 24 22 - 29 mmol/L    Anion Gap 11 7 - 15 mmol/L    Glucose 102 (H) 70 - 99 mg/dL    Calcium 9.8 8.6 - 10.0 mg/dL    Protein Total 7.4 6.4 - 8.3 g/dL    Albumin 4.6 3.5 - 5.2 g/dL    Bilirubin Total 0.5 <=1.2 mg/dL    Alkaline Phosphatase 76 35 - 104 U/L    AST 26 10 - 35 U/L    ALT 26 10 - 35 U/L    GFR Estimate 71 >60 mL/min/1.73m2      No EKG required, no history of coronary heart disease, significant arrhythmia, peripheral arterial disease or other structural heart disease.    Revised Cardiac Risk Index (RCRI):  The patient has the following serious cardiovascular risks for perioperative complications:   - No serious cardiac risks = 0 points     RCRI Interpretation: 0 points: Class I (very low risk - 0.4% complication rate)           Signed Electronically by: Veronica Allen  Copy of  this evaluation report is provided to requesting physician.

## 2022-09-19 ENCOUNTER — LAB (OUTPATIENT)
Dept: LAB | Facility: CLINIC | Age: 57
End: 2022-09-19
Payer: COMMERCIAL

## 2022-09-19 DIAGNOSIS — Z01.818 PREOP GENERAL PHYSICAL EXAM: ICD-10-CM

## 2022-09-19 PROCEDURE — U0003 INFECTIOUS AGENT DETECTION BY NUCLEIC ACID (DNA OR RNA); SEVERE ACUTE RESPIRATORY SYNDROME CORONAVIRUS 2 (SARS-COV-2) (CORONAVIRUS DISEASE [COVID-19]), AMPLIFIED PROBE TECHNIQUE, MAKING USE OF HIGH THROUGHPUT TECHNOLOGIES AS DESCRIBED BY CMS-2020-01-R: HCPCS

## 2022-09-19 PROCEDURE — U0005 INFEC AGEN DETEC AMPLI PROBE: HCPCS

## 2022-09-20 LAB — SARS-COV-2 RNA RESP QL NAA+PROBE: NEGATIVE

## 2022-09-21 ENCOUNTER — TRANSFERRED RECORDS (OUTPATIENT)
Dept: HEALTH INFORMATION MANAGEMENT | Facility: CLINIC | Age: 57
End: 2022-09-21

## 2022-09-22 ENCOUNTER — DOCUMENTATION ONLY (OUTPATIENT)
Dept: OTHER | Facility: CLINIC | Age: 57
End: 2022-09-22

## 2022-10-03 DIAGNOSIS — E66.9 DIABETES MELLITUS TYPE 2 IN OBESE: ICD-10-CM

## 2022-10-03 DIAGNOSIS — E11.69 DIABETES MELLITUS TYPE 2 IN OBESE: ICD-10-CM

## 2022-10-03 RX ORDER — SEMAGLUTIDE 1.34 MG/ML
INJECTION, SOLUTION SUBCUTANEOUS
Qty: 1.5 ML | Refills: 4 | Status: SHIPPED | OUTPATIENT
Start: 2022-10-03 | End: 2023-03-15

## 2022-11-04 ENCOUNTER — TRANSFERRED RECORDS (OUTPATIENT)
Dept: HEALTH INFORMATION MANAGEMENT | Facility: CLINIC | Age: 57
End: 2022-11-04

## 2022-11-10 ENCOUNTER — VIRTUAL VISIT (OUTPATIENT)
Dept: FAMILY MEDICINE | Facility: CLINIC | Age: 57
End: 2022-11-10
Payer: COMMERCIAL

## 2022-11-10 DIAGNOSIS — M43.16 SPONDYLOLISTHESIS OF LUMBAR REGION: Primary | ICD-10-CM

## 2022-11-10 DIAGNOSIS — E66.9 DIABETES MELLITUS TYPE 2 IN OBESE: ICD-10-CM

## 2022-11-10 DIAGNOSIS — E11.69 DIABETES MELLITUS TYPE 2 IN OBESE: ICD-10-CM

## 2022-11-10 DIAGNOSIS — M48.062 SPINAL STENOSIS OF LUMBAR REGION WITH NEUROGENIC CLAUDICATION: ICD-10-CM

## 2022-11-10 PROCEDURE — 99213 OFFICE O/P EST LOW 20 MIN: CPT | Mod: 95 | Performed by: FAMILY MEDICINE

## 2022-11-10 NOTE — PROGRESS NOTES
Carlos is a 57 year old who is being evaluated via a billable video visit.      How would you like to obtain your AVS? MyChart  If the video visit is dropped, the invitation should be resent by: Text to cell phone: [phone]  Will anyone else be joining your video visit? No          Assessment & Plan       ICD-10-CM    1. Spondylolisthesis of lumbar region  M43.16       2. Diabetes mellitus type 2 in obese (H)  E11.69     E66.9       3. Spinal stenosis of lumbar region with neurogenic claudication  M48.062         Filled out disability parking permit through June 2023.    Copied and mail permit.    Set physical for Aug. 2023.      20 minutes spent on the date of the encounter doing chart review, history and exam, documentation and further activities per the note           No follow-ups on file.    Ellen Kelsey MD  [facility]    Josiah Gonzalez is a 57 year old, presenting for the following health issues:  Form Request (Disability parking form)      History of Present Illness       Back Pain:  She presents for follow up of back pain. Patient's back pain is a chronic problem.  Location of back pain:  Right lower back, left lower back, right buttock, left buttock, right side of waist and left side of waist  Description of back pain: sharp and shooting  Back pain spreads: right buttocks, left buttocks, right thigh, left thigh, right foot and left foot    Since patient first noticed back pain, pain is: gradually improving  Does back pain interfere with her job:  Yes      She eats 2-3 servings of fruits and vegetables daily.She consumes 0 sweetened beverage(s) daily.She exercises with enough effort to increase her heart rate 20 to 29 minutes per day.  She exercises with enough effort to increase her heart rate 4 days per week.   She is taking medications regularly.     BACK SURGERY:  SEPT 21.  Had follow-up with surgeon and tingling up and down legs is largely gone. Still some  nerve pain that they are adressing.  They are recommending being off work for another 3 months.  She would like renewal of her handicap parking permit for 6 months.    She had been off work 8-17-22 through 9-20-22 with back and leg pain with her history of spinal stenosis.  Back surgeon will document the rest of her medical leave.          Review of Systems         Objective           Vitals:  No vitals were obtained today due to virtual visit.    Physical Exam   GENERAL: Healthy, alert and no distress on video                Video-Visit Details    Video Start Time: 6:13 PM    Type of service:  Video Visit    Video End Time:6:23 PM    Originating Location (pt. Location): Home        Distant Location (provider location):  On-site    Platform used for Video Visit: [employer]

## 2022-11-11 NOTE — PATIENT INSTRUCTIONS
Filled out disability parking permit through June 2023.    Copy and mail permit.    Set physical for Aug. 2023.

## 2023-02-03 ENCOUNTER — TRANSFERRED RECORDS (OUTPATIENT)
Dept: HEALTH INFORMATION MANAGEMENT | Facility: CLINIC | Age: 58
End: 2023-02-03

## 2023-03-14 DIAGNOSIS — E66.9 DIABETES MELLITUS TYPE 2 IN OBESE: ICD-10-CM

## 2023-03-14 DIAGNOSIS — E11.69 DIABETES MELLITUS TYPE 2 IN OBESE: ICD-10-CM

## 2023-03-15 RX ORDER — SEMAGLUTIDE 1.34 MG/ML
INJECTION, SOLUTION SUBCUTANEOUS
Qty: 1.5 ML | Refills: 4 | Status: SHIPPED | OUTPATIENT
Start: 2023-03-15 | End: 2023-05-08

## 2023-03-15 NOTE — TELEPHONE ENCOUNTER
"Routing refill request to provider for review/approval because:  Labs not current:  a1c    Last Written Prescription Date:  10/3/22  Last Fill Quantity: 1.5,  # refills: 4   Last office visit provider:  11/10/22     Requested Prescriptions   Pending Prescriptions Disp Refills     OZEMPIC (0.25 OR 0.5 MG/DOSE) 2 MG/1.5ML SOPN pen [Pharmacy Med Name: OZEMPIC 0.25-0.5 MG/DOSE PEN]  4     Sig: INJECT 0.5 MG SUBCUTANEOUSLY EVERY 7 DAYS       GLP-1 Agonists Protocol Failed - 3/14/2023 12:26 AM        Failed - HgbA1C in past 3 or 6 months     If HgbA1C is 8 or greater, it needs to be on file within the past 3 months.  If less than 8, must be on file within the past 6 months.     Recent Labs   Lab Test 08/22/22  1115   A1C 5.6             Passed - Medication is active on med list        Passed - Patient is age 18 or older        Passed - No active pregnancy on record        Passed - Normal serum creatinine on file in past 12 months     Recent Labs   Lab Test 08/22/22  1115   CR 0.94       Ok to refill medication if creatinine is low          Passed - No positive pregnancy test in past 12 months        Passed - Recent (6 mo) or future (30 days) visit within the authorizing provider's specialty     Patient had office visit in the last 6 months or has a visit in the next 30 days with authorizing provider.  See \"Patient Info\" tab in inbasket, or \"Choose Columns\" in Meds & Orders section of the refill encounter.                 Linda Shaw RN 03/14/23 9:49 PM  "

## 2023-03-30 ENCOUNTER — TRANSFERRED RECORDS (OUTPATIENT)
Dept: HEALTH INFORMATION MANAGEMENT | Facility: CLINIC | Age: 58
End: 2023-03-30

## 2023-04-07 DIAGNOSIS — E03.9 HYPOTHYROIDISM, UNSPECIFIED TYPE: ICD-10-CM

## 2023-04-08 RX ORDER — LEVOTHYROXINE SODIUM 112 UG/1
TABLET ORAL
Qty: 90 TABLET | Refills: 1 | Status: SHIPPED | OUTPATIENT
Start: 2023-04-08 | End: 2023-09-07

## 2023-04-09 ENCOUNTER — HEALTH MAINTENANCE LETTER (OUTPATIENT)
Age: 58
End: 2023-04-09

## 2023-04-09 NOTE — TELEPHONE ENCOUNTER
"Last Written Prescription Date: 5/5/2022  Last Fill Quantity: 90,  # refills: 3   Last office visit provider:  11/10/2022 Virtual visit with PCP Dr CINDI Kelsey    Requested Prescriptions   Pending Prescriptions Disp Refills     levothyroxine (SYNTHROID/LEVOTHROID) 112 MCG tablet [Pharmacy Med Name: LEVOTHYROXINE 112 MCG TABLET] 90 tablet 3     Sig: TAKE 1 TABLET BY MOUTH EVERY DAY       Thyroid Protocol Passed - 4/7/2023  3:26 PM        Passed - Patient is 12 years or older        Passed - Recent (12 mo) or future (30 days) visit within the authorizing provider's specialty     Patient has had an office visit with the authorizing provider or a provider within the authorizing providers department within the previous 12 mos or has a future within next 30 days. See \"Patient Info\" tab in inbasket, or \"Choose Columns\" in Meds & Orders section of the refill encounter.              Passed - Medication is active on med list        Passed - Normal TSH on file in past 12 months     Recent Labs   Lab Test 08/22/22  1115   TSH 0.66              Passed - No active pregnancy on record     If patient is pregnant or has had a positive pregnancy test, please check TSH.          Passed - No positive pregnancy test in past 12 months     If patient is pregnant or has had a positive pregnancy test, please check TSH.               Garima Sharma RN 04/08/23 9:37 PM  " ----- Message from Susanna Cr RN sent at 12/13/2021  1:14 PM CST -----    ----- Message -----  From: Misael Shane MD  Sent: 12/13/2021   1:05 PM CST  To: Susanna Cr RN    Please notify the patient of close to normal echo results.  Follow-up as planned. Thanks

## 2023-04-14 ENCOUNTER — TRANSFERRED RECORDS (OUTPATIENT)
Dept: HEALTH INFORMATION MANAGEMENT | Facility: CLINIC | Age: 58
End: 2023-04-14
Payer: COMMERCIAL

## 2023-04-25 ENCOUNTER — DOCUMENTATION ONLY (OUTPATIENT)
Dept: LAB | Facility: CLINIC | Age: 58
End: 2023-04-25
Payer: COMMERCIAL

## 2023-04-25 DIAGNOSIS — R73.9 HYPERGLYCEMIA: Primary | ICD-10-CM

## 2023-04-25 NOTE — PROGRESS NOTES
Aby Giraldo has an upcoming lab appointment:    Future Appointments   Date Time Provider Department Center   4/26/2023  1:15 PM TS LAB [facility]     Patient is scheduled for the following lab(s): A1C     There is no order available. Please review and place either future orders or HMPO (Review of Health Maintenance Protocol Orders), as appropriate.    Health Maintenance Due   Topic     MICROALBUMIN      ANNUAL REVIEW OF HM ORDERS      A1C      Dacia Raygoza

## 2023-04-26 ENCOUNTER — LAB (OUTPATIENT)
Dept: LAB | Facility: CLINIC | Age: 58
End: 2023-04-26
Payer: COMMERCIAL

## 2023-04-26 DIAGNOSIS — R73.9 HYPERGLYCEMIA: ICD-10-CM

## 2023-04-26 LAB
CREAT UR-MCNC: 141 MG/DL
HBA1C MFR BLD: 5.6 % (ref 0–5.6)
MICROALBUMIN UR-MCNC: <12 MG/L
MICROALBUMIN/CREAT UR: NORMAL MG/G{CREAT}

## 2023-04-26 PROCEDURE — 82043 UR ALBUMIN QUANTITATIVE: CPT

## 2023-04-26 PROCEDURE — 83036 HEMOGLOBIN GLYCOSYLATED A1C: CPT

## 2023-04-26 PROCEDURE — 36415 COLL VENOUS BLD VENIPUNCTURE: CPT

## 2023-04-26 PROCEDURE — 82570 ASSAY OF URINE CREATININE: CPT

## 2023-04-27 ENCOUNTER — ANCILLARY PROCEDURE (OUTPATIENT)
Dept: MAMMOGRAPHY | Facility: CLINIC | Age: 58
End: 2023-04-27
Attending: FAMILY MEDICINE
Payer: COMMERCIAL

## 2023-04-27 DIAGNOSIS — Z12.31 VISIT FOR SCREENING MAMMOGRAM: ICD-10-CM

## 2023-04-27 PROCEDURE — 77067 SCR MAMMO BI INCL CAD: CPT

## 2023-05-30 ENCOUNTER — TRANSFERRED RECORDS (OUTPATIENT)
Dept: HEALTH INFORMATION MANAGEMENT | Facility: CLINIC | Age: 58
End: 2023-05-30
Payer: COMMERCIAL

## 2023-06-09 DIAGNOSIS — F43.23 ADJUSTMENT DISORDER WITH MIXED ANXIETY AND DEPRESSED MOOD: ICD-10-CM

## 2023-06-09 RX ORDER — ESCITALOPRAM OXALATE 20 MG/1
TABLET ORAL
Qty: 90 TABLET | Refills: 3 | Status: SHIPPED | OUTPATIENT
Start: 2023-06-09 | End: 2024-06-07

## 2023-06-09 NOTE — TELEPHONE ENCOUNTER
"Routing refill request to provider for review/approval because:  Patient needs to be seen because it has been more than 6 months since last office visit.    Last Written Prescription Date:  7/12/22  Last Fill Quantity: 90,  # refills: 3  Last office visit provider:  11/12/22    Requested Prescriptions   Pending Prescriptions Disp Refills     escitalopram (LEXAPRO) 20 MG tablet [Pharmacy Med Name: ESCITALOPRAM 20 MG TABLET] 90 tablet 3     Sig: TAKE 1 TABLET BY MOUTH EVERY DAY       SSRIs Protocol Failed - 6/9/2023  1:05 AM        Failed - PHQ-9 score less than 5 in past 6 months     Please review last PHQ-9 score.           Failed - Recent (6 mo) or future (30 days) visit within the authorizing provider's specialty     Patient had office visit in the last 6 months or has a visit in the next 30 days with authorizing provider or within the authorizing provider's specialty.  See \"Patient Info\" tab in inbasket, or \"Choose Columns\" in Meds & Orders section of the refill encounter.            Passed - Medication is active on med list        Passed - Patient is age 18 or older        Passed - No active pregnancy on record        Passed - No positive pregnancy test in last 12 months             Aide Tapia RN 06/09/23 1:48 AM  "

## 2023-06-21 ENCOUNTER — TRANSFERRED RECORDS (OUTPATIENT)
Dept: HEALTH INFORMATION MANAGEMENT | Facility: CLINIC | Age: 58
End: 2023-06-21

## 2023-07-24 ENCOUNTER — PATIENT OUTREACH (OUTPATIENT)
Dept: CARE COORDINATION | Facility: CLINIC | Age: 58
End: 2023-07-24
Payer: COMMERCIAL

## 2023-07-25 ENCOUNTER — TRANSFERRED RECORDS (OUTPATIENT)
Dept: HEALTH INFORMATION MANAGEMENT | Facility: CLINIC | Age: 58
End: 2023-07-25
Payer: COMMERCIAL

## 2023-08-07 ENCOUNTER — PATIENT OUTREACH (OUTPATIENT)
Dept: CARE COORDINATION | Facility: CLINIC | Age: 58
End: 2023-08-07
Payer: COMMERCIAL

## 2023-08-28 ENCOUNTER — TRANSFERRED RECORDS (OUTPATIENT)
Dept: HEALTH INFORMATION MANAGEMENT | Facility: CLINIC | Age: 58
End: 2023-08-28
Payer: COMMERCIAL

## 2023-08-28 DIAGNOSIS — E11.69 DIABETES MELLITUS TYPE 2 IN OBESE: ICD-10-CM

## 2023-08-28 DIAGNOSIS — E66.9 DIABETES MELLITUS TYPE 2 IN OBESE: ICD-10-CM

## 2023-08-28 RX ORDER — SEMAGLUTIDE 0.68 MG/ML
INJECTION, SOLUTION SUBCUTANEOUS
Qty: 3 ML | Refills: 1 | Status: SHIPPED | OUTPATIENT
Start: 2023-08-28 | End: 2023-12-01

## 2023-08-28 NOTE — TELEPHONE ENCOUNTER
"Routing refill request to provider for review/approval because:  Labs not current:  CR  Patient needs to be seen because:  It has been over 6 months since last visit  Request for alternate treatment per pharmacy per insurance     Last Written Prescription Date:  5-8-23  Last Fill Quantity: 3ml,  # refills: 11   Last office visit provider:  11-10-22     Requested Prescriptions   Pending Prescriptions Disp Refills    OZEMPIC (0.25 OR 0.5 MG/DOSE) 2 MG/3ML pen [Pharmacy Med Name: OZEMPIC 0.25-0.5 MG/DOSE PEN]  11     Sig: INJECT 0.25MG SUBCUTANEOUSLY EVERY 7 DAYS       GLP-1 Agonists Protocol Failed - 8/28/2023 11:13 AM        Failed - Normal serum creatinine on file in past 12 months     Recent Labs   Lab Test 08/22/22  1115   CR 0.94       Ok to refill medication if creatinine is low          Failed - Recent (6 mo) or future (30 days) visit within the authorizing provider's specialty     Patient had office visit in the last 6 months or has a visit in the next 30 days with authorizing provider.  See \"Patient Info\" tab in inbasket, or \"Choose Columns\" in Meds & Orders section of the refill encounter.            Passed - HgbA1C in past 3 or 6 months     If HgbA1C is 8 or greater, it needs to be on file within the past 3 months.  If less than 8, must be on file within the past 6 months.     Recent Labs   Lab Test 04/26/23  1318   A1C 5.6             Passed - Medication is active on med list        Passed - Patient is age 18 or older        Passed - No active pregnancy on record        Passed - No positive pregnancy test in past 12 months             Monika Fischer RN 08/28/23 4:02 PM  "

## 2023-09-07 DIAGNOSIS — E03.9 HYPOTHYROIDISM, UNSPECIFIED TYPE: ICD-10-CM

## 2023-09-07 RX ORDER — LEVOTHYROXINE SODIUM 112 UG/1
TABLET ORAL
Qty: 90 TABLET | Refills: 1 | Status: SHIPPED | OUTPATIENT
Start: 2023-09-07 | End: 2024-03-13

## 2023-09-07 NOTE — TELEPHONE ENCOUNTER
"Routing refill request to provider for review/approval because:  Labs not current:  TSH    Last Written Prescription Date:  4/8/2023  Last Fill Quantity: 90,  # refills: 1   Last office visit provider:  11/10/2022     Requested Prescriptions   Pending Prescriptions Disp Refills    levothyroxine (SYNTHROID/LEVOTHROID) 112 MCG tablet [Pharmacy Med Name: LEVOTHYROXINE 112 MCG TABLET] 90 tablet 1     Sig: TAKE 1 TABLET BY MOUTH EVERY DAY       Thyroid Protocol Failed - 9/7/2023 12:27 AM        Failed - Normal TSH on file in past 12 months     Recent Labs   Lab Test 08/22/22  1115   TSH 0.66              Passed - Patient is 12 years or older        Passed - Recent (12 mo) or future (30 days) visit within the authorizing provider's specialty     Patient has had an office visit with the authorizing provider or a provider within the authorizing providers department within the previous 12 mos or has a future within next 30 days. See \"Patient Info\" tab in inbasket, or \"Choose Columns\" in Meds & Orders section of the refill encounter.              Passed - Medication is active on med list        Passed - No active pregnancy on record     If patient is pregnant or has had a positive pregnancy test, please check TSH.          Passed - No positive pregnancy test in past 12 months     If patient is pregnant or has had a positive pregnancy test, please check TSH.               Geri Fuller RN 09/07/23 12:31 AM  "

## 2023-09-08 DIAGNOSIS — E78.5 HYPERLIPIDEMIA, UNSPECIFIED: ICD-10-CM

## 2023-09-08 RX ORDER — LOVASTATIN 40 MG
TABLET ORAL
Qty: 90 TABLET | Refills: 3 | Status: SHIPPED | OUTPATIENT
Start: 2023-09-08 | End: 2024-08-27

## 2023-09-08 NOTE — TELEPHONE ENCOUNTER
"Routing refill request to provider for review/approval because:  Labs not current:  8/22/2022    Last Written Prescription Date:  7/12/2022  Last Fill Quantity: 90,  # refills: 3   Last office visit provider:  11/10/2022   Future appointment 11/21/2022  Requested Prescriptions   Pending Prescriptions Disp Refills    lovastatin (MEVACOR) 40 MG tablet [Pharmacy Med Name: LOVASTATIN 40 MG TABLET] 90 tablet 3     Sig: TAKE 1 TABLET BY MOUTH EVERY DAY       Statins Protocol Failed - 9/8/2023  7:08 AM        Failed - LDL on file in past 12 months     Recent Labs   Lab Test 08/22/22  1115   *             Passed - No abnormal creatine kinase in past 12 months     No lab results found.             Passed - Recent (12 mo) or future (30 days) visit within the authorizing provider's specialty     Patient has had an office visit with the authorizing provider or a provider within the authorizing providers department within the previous 12 mos or has a future within next 30 days. See \"Patient Info\" tab in inbasket, or \"Choose Columns\" in Meds & Orders section of the refill encounter.              Passed - Medication is active on med list        Passed - Patient is age 18 or older        Passed - No active pregnancy on record        Passed - No positive pregnancy test in past 12 months             Silvia Parr RN 09/08/23 7:08 AM  "

## 2023-09-14 ENCOUNTER — TRANSFERRED RECORDS (OUTPATIENT)
Dept: HEALTH INFORMATION MANAGEMENT | Facility: CLINIC | Age: 58
End: 2023-09-14
Payer: COMMERCIAL

## 2023-09-22 ENCOUNTER — TRANSFERRED RECORDS (OUTPATIENT)
Dept: HEALTH INFORMATION MANAGEMENT | Facility: CLINIC | Age: 58
End: 2023-09-22
Payer: COMMERCIAL

## 2023-10-13 ENCOUNTER — TRANSFERRED RECORDS (OUTPATIENT)
Dept: HEALTH INFORMATION MANAGEMENT | Facility: CLINIC | Age: 58
End: 2023-10-13
Payer: COMMERCIAL

## 2023-11-09 ENCOUNTER — LAB REQUISITION (OUTPATIENT)
Dept: LAB | Facility: CLINIC | Age: 58
End: 2023-11-09

## 2023-11-09 DIAGNOSIS — R93.89 ABNORMAL FINDINGS ON DIAGNOSTIC IMAGING OF OTHER SPECIFIED BODY STRUCTURES: ICD-10-CM

## 2023-11-09 PROCEDURE — 88305 TISSUE EXAM BY PATHOLOGIST: CPT | Performed by: PATHOLOGY

## 2023-11-13 LAB
PATH REPORT.COMMENTS IMP SPEC: NORMAL
PATH REPORT.COMMENTS IMP SPEC: NORMAL
PATH REPORT.FINAL DX SPEC: NORMAL
PATH REPORT.GROSS SPEC: NORMAL
PATH REPORT.MICROSCOPIC SPEC OTHER STN: NORMAL
PATH REPORT.RELEVANT HX SPEC: NORMAL
PHOTO IMAGE: NORMAL

## 2023-11-17 ENCOUNTER — TRANSFERRED RECORDS (OUTPATIENT)
Dept: HEALTH INFORMATION MANAGEMENT | Facility: CLINIC | Age: 58
End: 2023-11-17
Payer: COMMERCIAL

## 2023-11-19 ENCOUNTER — HEALTH MAINTENANCE LETTER (OUTPATIENT)
Age: 58
End: 2023-11-19

## 2023-11-21 ENCOUNTER — OFFICE VISIT (OUTPATIENT)
Dept: FAMILY MEDICINE | Facility: CLINIC | Age: 58
End: 2023-11-21
Payer: COMMERCIAL

## 2023-11-21 VITALS
BODY MASS INDEX: 45.73 KG/M2 | HEIGHT: 67 IN | DIASTOLIC BLOOD PRESSURE: 80 MMHG | HEART RATE: 95 BPM | SYSTOLIC BLOOD PRESSURE: 134 MMHG | TEMPERATURE: 98 F | OXYGEN SATURATION: 95 % | RESPIRATION RATE: 20 BRPM | WEIGHT: 291.38 LBS

## 2023-11-21 DIAGNOSIS — E55.9 VITAMIN D DEFICIENCY: ICD-10-CM

## 2023-11-21 DIAGNOSIS — H69.93 DYSFUNCTION OF BOTH EUSTACHIAN TUBES: ICD-10-CM

## 2023-11-21 DIAGNOSIS — G89.29 OTHER CHRONIC PAIN: ICD-10-CM

## 2023-11-21 DIAGNOSIS — E66.9 DIABETES MELLITUS TYPE 2 IN OBESE: Primary | ICD-10-CM

## 2023-11-21 DIAGNOSIS — E11.69 DIABETES MELLITUS TYPE 2 IN OBESE: Primary | ICD-10-CM

## 2023-11-21 DIAGNOSIS — E78.5 HYPERLIPIDEMIA LDL GOAL <100: ICD-10-CM

## 2023-11-21 DIAGNOSIS — Z79.899 MEDICATION MANAGEMENT: ICD-10-CM

## 2023-11-21 DIAGNOSIS — E66.01 MORBID OBESITY (H): ICD-10-CM

## 2023-11-21 DIAGNOSIS — E03.9 HYPOTHYROIDISM, UNSPECIFIED TYPE: ICD-10-CM

## 2023-11-21 LAB
ERYTHROCYTE [DISTWIDTH] IN BLOOD BY AUTOMATED COUNT: 12.7 % (ref 10–15)
HBA1C MFR BLD: 6 % (ref 0–5.6)
HCT VFR BLD AUTO: 42.8 % (ref 35–47)
HGB BLD-MCNC: 14.1 G/DL (ref 11.7–15.7)
MCH RBC QN AUTO: 29.8 PG (ref 26.5–33)
MCHC RBC AUTO-ENTMCNC: 32.9 G/DL (ref 31.5–36.5)
MCV RBC AUTO: 91 FL (ref 78–100)
PLATELET # BLD AUTO: 270 10E3/UL (ref 150–450)
RBC # BLD AUTO: 4.73 10E6/UL (ref 3.8–5.2)
WBC # BLD AUTO: 5.1 10E3/UL (ref 4–11)

## 2023-11-21 PROCEDURE — 36415 COLL VENOUS BLD VENIPUNCTURE: CPT | Performed by: FAMILY MEDICINE

## 2023-11-21 PROCEDURE — 82306 VITAMIN D 25 HYDROXY: CPT | Performed by: FAMILY MEDICINE

## 2023-11-21 PROCEDURE — 85027 COMPLETE CBC AUTOMATED: CPT | Performed by: FAMILY MEDICINE

## 2023-11-21 PROCEDURE — 83036 HEMOGLOBIN GLYCOSYLATED A1C: CPT | Performed by: FAMILY MEDICINE

## 2023-11-21 PROCEDURE — 99214 OFFICE O/P EST MOD 30 MIN: CPT | Performed by: FAMILY MEDICINE

## 2023-11-21 PROCEDURE — 80048 BASIC METABOLIC PNL TOTAL CA: CPT | Performed by: FAMILY MEDICINE

## 2023-11-21 PROCEDURE — 84443 ASSAY THYROID STIM HORMONE: CPT | Performed by: FAMILY MEDICINE

## 2023-11-21 PROCEDURE — 84439 ASSAY OF FREE THYROXINE: CPT | Performed by: FAMILY MEDICINE

## 2023-11-21 PROCEDURE — 80061 LIPID PANEL: CPT | Performed by: FAMILY MEDICINE

## 2023-11-21 RX ORDER — AMOXICILLIN 500 MG/1
500 CAPSULE ORAL
COMMUNITY
Start: 2023-03-30

## 2023-11-21 RX ORDER — OLOPATADINE HYDROCHLORIDE 2 MG/ML
SOLUTION/ DROPS OPHTHALMIC
COMMUNITY
Start: 2023-10-26

## 2023-11-21 ASSESSMENT — ENCOUNTER SYMPTOMS
NERVOUS/ANXIOUS: 1
BREAST MASS: 0

## 2023-11-21 ASSESSMENT — PATIENT HEALTH QUESTIONNAIRE - PHQ9
SUM OF ALL RESPONSES TO PHQ QUESTIONS 1-9: 2
5. POOR APPETITE OR OVEREATING: NOT AT ALL

## 2023-11-21 ASSESSMENT — ANXIETY QUESTIONNAIRES
GAD7 TOTAL SCORE: 4
6. BECOMING EASILY ANNOYED OR IRRITABLE: SEVERAL DAYS
1. FEELING NERVOUS, ANXIOUS, OR ON EDGE: SEVERAL DAYS
7. FEELING AFRAID AS IF SOMETHING AWFUL MIGHT HAPPEN: NOT AT ALL
2. NOT BEING ABLE TO STOP OR CONTROL WORRYING: SEVERAL DAYS
3. WORRYING TOO MUCH ABOUT DIFFERENT THINGS: SEVERAL DAYS
GAD7 TOTAL SCORE: 4
5. BEING SO RESTLESS THAT IT IS HARD TO SIT STILL: NOT AT ALL
IF YOU CHECKED OFF ANY PROBLEMS ON THIS QUESTIONNAIRE, HOW DIFFICULT HAVE THESE PROBLEMS MADE IT FOR YOU TO DO YOUR WORK, TAKE CARE OF THINGS AT HOME, OR GET ALONG WITH OTHER PEOPLE: SOMEWHAT DIFFICULT

## 2023-11-21 NOTE — PROGRESS NOTES
Assessment & Plan     1. Diabetes mellitus type 2 in obese (H)  - BASIC METABOLIC PANEL; Future  - HEMOGLOBIN A1C; Future    On Ozempic, managed by Dr. Kelsey.  AGNES signed for eye exam today.  Foot exam normal today.  Monitoring labs today.  Well managed on Ozempic.    2. Hyperlipidemia LDL goal <100  - Lipid panel reflex to direct LDL Fasting; Future    Managed on lovastatin, monitoring labs today.    3. Hypothyroidism, unspecified type  - TSH WITH FREE T4 REFLEX; Future    Managed on levothyroxine, monitoring labs today.    4. Morbid obesity (H)  - BASIC METABOLIC PANEL; Future  - Lipid panel reflex to direct LDL Fasting; Future  - TSH WITH FREE T4 REFLEX; Future  - HEMOGLOBIN A1C; Future    Monitoring labs, continue Ozempic.    5. Vitamin D deficiency  - Vitamin D Deficiency; Future    On vitamin D supplementation.    6. Medication management  - CBC with platelets; Future    Taking baby aspirin daily, rule out occult anemia.    7. Other chronic pain  - Pain Management  Referral; Future    Many different conditions contributing to chronic pain, multiple surgeries in the last year, does not want any further interventions.  She has run the gamut of oral options.  Is interested in looking into medical marijuana.  Recommend formal consultation with the pain clinic for options.    8. Dysfunction of both eustachian tubes  Sensation of ear plugging and fullness, slight pain.  No acute infection present.  Signs of eustachian tube dysfunction with retracted eardrums and posterior effusion.  Recommend trial of Flonase OTC.      Kalee Finn DO  [facility]    Josiah Gonzalez is a 58 year old, presenting for the following health issues:  Blood Draw, Plugged Ears, Eye Problem (Bumps around eyes), and Pain (Discuss medical marijuana .)    Chief Complaints and History of Present Illnesses   Patient presents with    Blood Draw    Plugged Ears    Eye Problem     Bumps around eyes     Pain     Discuss medical marijuana .            11/21/2023     4:12 PM   Additional Questions   Roomed by Lise MERCHANT CMA   Accompanied by Self     Declines physical today - saw [facility]    Pain    Healthy Habits:     Getting at least 3 servings of Calcium per day:  NO    Bi-annual eye exam:  Yes    Dental care twice a year:  Yes    Sleep apnea or symptoms of sleep apnea:  None    Diet:  Regular (no restrictions)    Frequency of exercise:  4-5 days/week    Duration of exercise:  15-30 minutes    Taking medications regularly:  Yes    Medication side effects:  Not applicable    Additional concerns today:  Yes       BLOOD DRAW: will update labs for medication monitoring.        EARS: Feeling congested, reports history of ear infections.   Hasn't been taking anything recently.   Feeling more congested, facial pressures.  Freezing last night, since yesterday.       EYES: Reports small white bumps near her eyes, sometimes off to the side.  Saw [facility] doctor at [facility] in April, didn't touch them. .       PAIN: 6 surgeries recently - back, bilateral hips, right hand carpal tunnel, CMC, left hand carpal tunnel and CMC, uterus. Feeling on edge and in pain. Has tried many different things - massage gun, stretch, PT, tens unit, gabapentin, prednisone, muscle relaxers, tylenol, oxycodone postoperatively, NSAIDS.   - Back, right side, radiating down to top of right foot. Seeing [place]. Recommended back injection, ARACELI, helps for a few months. Dr. Donaldson wondering about a surgical implant? Tired of surgery and implants.   - Didn't think gabapentin helped.   - Restarted therapist last week. Lexapro helping with anxiety.       HEALTH MAINTENANCE:   - Declines immunizations today, not feeling so well.   - Eye: AGNES signed      Review of Systems   HENT:  Positive for ear pain.    Breasts:  Negative for tenderness, breast mass and discharge.   Genitourinary:  Positive for pelvic pain. Negative for vaginal bleeding and  "vaginal discharge.   Psychiatric/Behavioral:  The patient is nervous/anxious.       EAR: See above.     PELVIC PAIN: see pain above      NERVOUS/ANXIOUS: working with a therapist, on Lexapro 20mg daily.          10/4/2021    11:00 AM 4/4/2022     2:28 PM 6/8/2022    12:59 PM   PHQ   PHQ-9 Total Score 6 14 9   Q9: Thoughts of better off dead/self-harm past 2 weeks Not at all Not at all Several days   F/U: Thoughts of suicide or self-harm   No   F/U: Safety concerns   No   PHQ9: 2/27 8/5/2021    12:00 PM 9/2/2021     3:15 PM 4/4/2022     2:28 PM   HELGA-7 SCORE   Total Score   13 (moderate anxiety)   Total Score 18 9 13   GAD7: 4/21           Objective    /80 (BP Location: Right arm, Patient Position: Sitting, Cuff Size: Adult Regular)   Pulse 95   Temp 98  F (36.7  C) (Oral)   Resp 20   Ht 1.702 m (5' 7\")   Wt 132.2 kg (291 lb 6 oz)   LMP  (LMP Unknown)   SpO2 95%   BMI 45.64 kg/m    Body mass index is 45.64 kg/m .  Physical Exam   GENERAL: healthy, alert and no distress  NECK: no adenopathy, no asymmetry, masses, or scars and thyroid normal to palpation  RESP: lungs clear to auscultation - no rales, rhonchi or wheezes  CV: regular rate and rhythm, normal S1 S2, no S3 or S4, no murmur, click or rub, no peripheral edema and peripheral pulses strong  ABDOMEN: soft, nontender, no hepatosplenomegaly, no masses and bowel sounds normal  MS: no gross musculoskeletal defects noted, no edema    Diabetic foot exam: normal DP and PT pulses, no trophic changes or ulcerative lesions, and normal monofilament exam                "

## 2023-11-22 LAB
ANION GAP SERPL CALCULATED.3IONS-SCNC: 11 MMOL/L (ref 7–15)
BUN SERPL-MCNC: 14.9 MG/DL (ref 6–20)
CALCIUM SERPL-MCNC: 9.5 MG/DL (ref 8.6–10)
CHLORIDE SERPL-SCNC: 102 MMOL/L (ref 98–107)
CHOLEST SERPL-MCNC: 199 MG/DL
CREAT SERPL-MCNC: 1 MG/DL (ref 0.51–0.95)
DEPRECATED HCO3 PLAS-SCNC: 25 MMOL/L (ref 22–29)
EGFRCR SERPLBLD CKD-EPI 2021: 65 ML/MIN/1.73M2
GLUCOSE SERPL-MCNC: 141 MG/DL (ref 70–99)
HDLC SERPL-MCNC: 58 MG/DL
LDLC SERPL CALC-MCNC: 117 MG/DL
NONHDLC SERPL-MCNC: 141 MG/DL
POTASSIUM SERPL-SCNC: 4.1 MMOL/L (ref 3.4–5.3)
SODIUM SERPL-SCNC: 138 MMOL/L (ref 135–145)
T4 FREE SERPL-MCNC: 1.09 NG/DL (ref 0.9–1.7)
TRIGL SERPL-MCNC: 120 MG/DL
TSH SERPL DL<=0.005 MIU/L-ACNC: 5.36 UIU/ML (ref 0.3–4.2)
VIT D+METAB SERPL-MCNC: 58 NG/ML (ref 20–50)

## 2023-11-22 NOTE — RESULT ENCOUNTER NOTE
The 10-year ASCVD risk score (Jonas DANG, et al., 2019) is: 4.8%  Patient updated by XDC message with lab results.       Gabriela Badillo,  Your lab results have returned.  1.  Cholesterol labs reasonable.  Continue lovastatin and healthy lifestyle modifications.  2.  TSH is mildly elevated though free T4 is in the normal range.  I recommend continuing current dose of levothyroxine.  3.  A1c is well controlled though up from last check.  Please continue Ozempic.  Remaining labs look good.  Please reach out with follow-up questions or concerns.  Kalee Finn, DO

## 2023-11-27 ENCOUNTER — TRANSFERRED RECORDS (OUTPATIENT)
Dept: HEALTH INFORMATION MANAGEMENT | Facility: CLINIC | Age: 58
End: 2023-11-27
Payer: COMMERCIAL

## 2023-12-01 DIAGNOSIS — E66.9 DIABETES MELLITUS TYPE 2 IN OBESE: ICD-10-CM

## 2023-12-01 DIAGNOSIS — E11.69 DIABETES MELLITUS TYPE 2 IN OBESE: ICD-10-CM

## 2023-12-01 RX ORDER — SEMAGLUTIDE 0.68 MG/ML
INJECTION, SOLUTION SUBCUTANEOUS
Qty: 3 ML | Refills: 1 | Status: SHIPPED | OUTPATIENT
Start: 2023-12-01

## 2024-02-16 ENCOUNTER — TRANSFERRED RECORDS (OUTPATIENT)
Dept: HEALTH INFORMATION MANAGEMENT | Facility: CLINIC | Age: 59
End: 2024-02-16
Payer: COMMERCIAL

## 2024-03-13 DIAGNOSIS — E03.9 HYPOTHYROIDISM, UNSPECIFIED TYPE: ICD-10-CM

## 2024-03-13 RX ORDER — LEVOTHYROXINE SODIUM 112 UG/1
TABLET ORAL
Qty: 90 TABLET | Refills: 1 | Status: SHIPPED | OUTPATIENT
Start: 2024-03-13

## 2024-03-19 ENCOUNTER — TRANSFERRED RECORDS (OUTPATIENT)
Dept: HEALTH INFORMATION MANAGEMENT | Facility: CLINIC | Age: 59
End: 2024-03-19
Payer: COMMERCIAL

## 2024-04-01 PROBLEM — E66.9 TYPE 2 DIABETES MELLITUS WITH OBESITY (H): Status: RESOLVED | Noted: 2018-03-02 | Resolved: 2022-11-10

## 2024-04-01 PROBLEM — E11.69 TYPE 2 DIABETES MELLITUS WITH OBESITY (H): Status: RESOLVED | Noted: 2018-03-02 | Resolved: 2022-11-10

## 2024-05-16 ENCOUNTER — TRANSFERRED RECORDS (OUTPATIENT)
Dept: HEALTH INFORMATION MANAGEMENT | Facility: CLINIC | Age: 59
End: 2024-05-16
Payer: COMMERCIAL

## 2024-06-07 DIAGNOSIS — F43.23 ADJUSTMENT DISORDER WITH MIXED ANXIETY AND DEPRESSED MOOD: ICD-10-CM

## 2024-06-07 RX ORDER — ESCITALOPRAM OXALATE 20 MG/1
20 TABLET ORAL DAILY
Qty: 60 TABLET | Refills: 0 | Status: SHIPPED | OUTPATIENT
Start: 2024-06-07 | End: 2024-08-05

## 2024-06-16 ENCOUNTER — HEALTH MAINTENANCE LETTER (OUTPATIENT)
Age: 59
End: 2024-06-16

## 2024-06-18 ENCOUNTER — TELEPHONE (OUTPATIENT)
Dept: FAMILY MEDICINE | Facility: CLINIC | Age: 59
End: 2024-06-18
Payer: COMMERCIAL

## 2024-06-18 NOTE — TELEPHONE ENCOUNTER
Medication Question or Refill        What medication are you calling about (include dose and sig)?: escitalopram (LEXAPRO) 20 MG tablet      Preferred Pharmacy:   [facility]/pharmacy #[id number] - [place] [address] [address]  Phone: [phone] Fax: [phone]      Controlled Substance Agreement on file:   CSA -- Patient Level:    CSA: None found at the patient level.       Who prescribed the medication?: PCP    Do you need a refill? Yes    When did you use the medication last? Ongoing    Patient offered an appointment? No    Do you have any questions or concerns?  No      Could we send this information to you in [employer] or would you prefer to receive a phone call?:   Patient would prefer a phone call   Okay to leave a detailed message?: Yes at Cell number on file:    Telephone Information:   Mobile [phone]

## 2024-06-18 NOTE — TELEPHONE ENCOUNTER
LMTCB. Wondering if patient has symptoms of any concern that would warrant a visit. No documentation from provider recommending that these labs be drawn.

## 2024-06-18 NOTE — TELEPHONE ENCOUNTER
Order/Referral Request    Who is requesting: [employer] Automated Notification    Orders being requested: A1C and MICROALBUMIN     Reason service is needed/diagnosis: per [employer]    When are orders needed by: when available     Has this been discussed with Provider: No    Does patient have a preference on a Group/Provider/Facility? [facility]     Does patient have an appointment scheduled?: No    Where to send orders: Place orders within Epic    Could we send this information to you in [employer] or would you prefer to receive a phone call?:   Patient would prefer a phone call   Okay to leave a detailed message?: Yes at Cell number on file:    Telephone Information:   Mobile [phone]

## 2024-06-19 NOTE — TELEPHONE ENCOUNTER
Called patient and LMTCB. Please gather reason for lab draw request before sending to provider.     Srinivas Gann RN

## 2024-06-24 ENCOUNTER — ANCILLARY PROCEDURE (OUTPATIENT)
Dept: MAMMOGRAPHY | Facility: CLINIC | Age: 59
End: 2024-06-24
Attending: FAMILY MEDICINE
Payer: COMMERCIAL

## 2024-06-24 DIAGNOSIS — Z12.31 VISIT FOR SCREENING MAMMOGRAM: ICD-10-CM

## 2024-06-24 PROCEDURE — 77063 BREAST TOMOSYNTHESIS BI: CPT

## 2024-06-25 ENCOUNTER — TRANSFERRED RECORDS (OUTPATIENT)
Dept: HEALTH INFORMATION MANAGEMENT | Facility: CLINIC | Age: 59
End: 2024-06-25
Payer: COMMERCIAL

## 2024-06-27 ENCOUNTER — TRANSFERRED RECORDS (OUTPATIENT)
Dept: MULTI SPECIALTY CLINIC | Facility: CLINIC | Age: 59
End: 2024-06-27

## 2024-06-27 LAB — RETINOPATHY: NORMAL

## 2024-07-05 ENCOUNTER — PATIENT OUTREACH (OUTPATIENT)
Dept: CARE COORDINATION | Facility: CLINIC | Age: 59
End: 2024-07-05
Payer: COMMERCIAL

## 2024-08-02 ENCOUNTER — TRANSFERRED RECORDS (OUTPATIENT)
Dept: HEALTH INFORMATION MANAGEMENT | Facility: CLINIC | Age: 59
End: 2024-08-02
Payer: COMMERCIAL

## 2024-08-05 DIAGNOSIS — F43.23 ADJUSTMENT DISORDER WITH MIXED ANXIETY AND DEPRESSED MOOD: ICD-10-CM

## 2024-08-05 RX ORDER — ESCITALOPRAM OXALATE 20 MG/1
20 TABLET ORAL DAILY
Qty: 90 TABLET | Refills: 1 | Status: SHIPPED | OUTPATIENT
Start: 2024-08-05

## 2024-08-26 DIAGNOSIS — E78.5 HYPERLIPIDEMIA, UNSPECIFIED: ICD-10-CM

## 2024-08-27 RX ORDER — LOVASTATIN 40 MG
TABLET ORAL
Qty: 90 TABLET | Refills: 0 | Status: SHIPPED | OUTPATIENT
Start: 2024-08-27

## 2024-10-08 ENCOUNTER — TRANSFERRED RECORDS (OUTPATIENT)
Dept: HEALTH INFORMATION MANAGEMENT | Facility: CLINIC | Age: 59
End: 2024-10-08
Payer: COMMERCIAL

## 2024-10-25 ENCOUNTER — OFFICE VISIT (OUTPATIENT)
Dept: FAMILY MEDICINE | Facility: CLINIC | Age: 59
End: 2024-10-25
Payer: COMMERCIAL

## 2024-10-25 VITALS
RESPIRATION RATE: 16 BRPM | WEIGHT: 293 LBS | HEART RATE: 77 BPM | DIASTOLIC BLOOD PRESSURE: 80 MMHG | BODY MASS INDEX: 47.09 KG/M2 | SYSTOLIC BLOOD PRESSURE: 120 MMHG | OXYGEN SATURATION: 95 % | TEMPERATURE: 97.9 F | HEIGHT: 66 IN

## 2024-10-25 DIAGNOSIS — E66.01 MORBID OBESITY (H): ICD-10-CM

## 2024-10-25 DIAGNOSIS — Z01.818 PREOP GENERAL PHYSICAL EXAM: Primary | ICD-10-CM

## 2024-10-25 DIAGNOSIS — M51.362 DEGENERATION OF INTERVERTEBRAL DISC OF LUMBAR REGION WITH DISCOGENIC BACK PAIN AND LOWER EXTREMITY PAIN: ICD-10-CM

## 2024-10-25 DIAGNOSIS — R73.03 PREDIABETES: ICD-10-CM

## 2024-10-25 LAB
ATRIAL RATE - MUSE: 78 BPM
DIASTOLIC BLOOD PRESSURE - MUSE: NORMAL MMHG
ERYTHROCYTE [DISTWIDTH] IN BLOOD BY AUTOMATED COUNT: 11.9 % (ref 10–15)
EST. AVERAGE GLUCOSE BLD GHB EST-MCNC: 137 MG/DL
HBA1C MFR BLD: 6.4 % (ref 0–5.6)
HCT VFR BLD AUTO: 44.3 % (ref 35–47)
HGB BLD-MCNC: 14.3 G/DL (ref 11.7–15.7)
INTERPRETATION ECG - MUSE: NORMAL
MCH RBC QN AUTO: 29.3 PG (ref 26.5–33)
MCHC RBC AUTO-ENTMCNC: 32.3 G/DL (ref 31.5–36.5)
MCV RBC AUTO: 91 FL (ref 78–100)
P AXIS - MUSE: 7 DEGREES
PLATELET # BLD AUTO: 292 10E3/UL (ref 150–450)
PR INTERVAL - MUSE: 172 MS
QRS DURATION - MUSE: 76 MS
QT - MUSE: 392 MS
QTC - MUSE: 446 MS
R AXIS - MUSE: 11 DEGREES
RBC # BLD AUTO: 4.88 10E6/UL (ref 3.8–5.2)
SYSTOLIC BLOOD PRESSURE - MUSE: NORMAL MMHG
T AXIS - MUSE: 13 DEGREES
VENTRICULAR RATE- MUSE: 78 BPM
WBC # BLD AUTO: 8 10E3/UL (ref 4–11)

## 2024-10-25 PROCEDURE — 99214 OFFICE O/P EST MOD 30 MIN: CPT | Performed by: FAMILY MEDICINE

## 2024-10-25 PROCEDURE — 85027 COMPLETE CBC AUTOMATED: CPT | Performed by: FAMILY MEDICINE

## 2024-10-25 PROCEDURE — 80048 BASIC METABOLIC PNL TOTAL CA: CPT | Performed by: FAMILY MEDICINE

## 2024-10-25 PROCEDURE — 36415 COLL VENOUS BLD VENIPUNCTURE: CPT | Performed by: FAMILY MEDICINE

## 2024-10-25 PROCEDURE — 93005 ELECTROCARDIOGRAM TRACING: CPT | Performed by: FAMILY MEDICINE

## 2024-10-25 PROCEDURE — 93010 ELECTROCARDIOGRAM REPORT: CPT | Performed by: INTERNAL MEDICINE

## 2024-10-25 PROCEDURE — 83036 HEMOGLOBIN GLYCOSYLATED A1C: CPT | Performed by: FAMILY MEDICINE

## 2024-10-25 NOTE — PATIENT INSTRUCTIONS
How to Take Your Medication Before Surgery  Preoperative Medication Instructions   Antiplatelet or Anticoagulation Medication Instructions   - Patient is on no antiplatelet or anticoagulation medications.    Additional Medication Instructions  Okay to take Lexapro, Levothyroxine, and lovastatin as scheduled.   Hold allergy medication and eye drops on morning of procedure.   Hold vitamins/supplements until after surgery.       Patient Education   Preparing for Your Surgery  For Adults  Getting started  In most cases, a nurse will call to review your health history and instructions. They will give you an arrival time based on your scheduled surgery time. Please be ready to share:  Your doctor's clinic name and phone number  Your medical, surgical, and anesthesia history  A list of allergies and sensitivities  A list of medicines, including herbal treatments and over-the-counter drugs  Whether the patient has a legal guardian (ask how to send us the papers in advance)  Note: You may not receive a call if you were seen at our PAC (Preoperative Assessment Center).  Please tell us if you're pregnant--or if there's any chance you might be pregnant. Some surgeries may injure a fetus (unborn baby), so they require a pregnancy test. Surgeries that are safe for a fetus don't always need a test, and you can choose whether to have one.   Preparing for surgery  Within 10 to 30 days of surgery: Have a pre-op exam (sometimes called an H&P, or History and Physical). This can be done at a clinic or pre-operative center.  If you're having a , you may not need this exam. Talk to your care team.  At your pre-op exam, talk to your care team about all medicines you take. (This includes CBD oil and any drugs, such as THC, marijuana, and other forms of cannabis.) If you need to stop any medicine before surgery, ask when to start taking it again.  This is for your safety. Many medicines and drugs can make you bleed too much during  surgery. Some change how well surgery (anesthesia) drugs work.  Call your insurance company to let them know you're having surgery. (If you don't have insurance, call [phone].)  Call your clinic if there's any change in your health. This includes a scrape or scratch near the surgery site, or any signs of a cold (sore throat, runny nose, cough, rash, fever).  Eating and drinking guidelines  For your safety: Unless your surgeon tells you otherwise, follow the guidelines below.  Eat and drink as normal until 8 hours before you arrive for surgery. After that, no food or milk. You can spit out gum when you arrive.  Drink clear liquids until 2 hours before you arrive. These are liquids you can see through, like water, Gatorade, and Propel Water. They also include plain black coffee and tea (no cream or milk).  No alcohol for 24 hours before you arrive. The night before surgery, stop any drinks that contain THC.  If your care team tells you to take medicine on the morning of surgery, it's okay to take it with a sip of water. No other medicines or drugs are allowed (including CBD oil)--follow your care team's instructions.  If you have questions the day of surgery, call your hospital or surgery center.   Preventing infection  Shower or bathe the night before and the morning of surgery. Follow the instructions your clinic gave you. (If no instructions, use regular soap.)  Don't shave or clip hair near your surgery site. We'll remove the hair if needed.  Don't smoke or vape the morning of surgery. No chewing tobacco for 6 hours before you arrive. A nicotine patch is okay. You may spit out nicotine gum when you arrive.  For some surgeries, the surgeon will tell you to fully quit smoking and nicotine.  We will make every effort to keep you safe from infection. We will:  Clean our hands often with soap and water (or an alcohol-based hand rub).  Clean the skin at your surgery site with a special soap that kills germs.  Give  you a special gown to keep you warm. (Cold raises the risk of infection.)  Wear hair covers, masks, gowns, and gloves during surgery.  Give antibiotic medicine, if prescribed. Not all surgeries need this medicine.  What to bring on the day of surgery  Photo ID and insurance card  Copy of your health care directive, if you have one  Glasses and hearing aids (bring cases)  You can't wear contacts during surgery  Inhaler and eye drops, if you use them (tell us about these when you arrive)  CPAP machine or breathing device, if you use them  A few personal items, if spending the night  If you have . . .  A pacemaker, ICD (cardiac defibrillator), or other implant: Bring the ID card.  An implanted stimulator: Bring the remote control.  A legal guardian: Bring a copy of the certified (court-stamped) guardianship papers.  Please remove any jewelry, including body piercings. Leave jewelry and other valuables at home.  If you're going home the day of surgery  You must have a responsible adult drive you home. They should stay with you overnight as well.  If you don't have someone to stay with you, and you aren't safe to go home alone, we may keep you overnight. Insurance often won't pay for this.  After surgery  If it's hard to control your pain or you need more pain medicine, please call your surgeon's office.  Questions?   If you have any questions for your care team, list them here:   ____________________________________________________________________________________________________________________________________________________________________________________________________________________________________________________________  For informational purposes only. Not to replace the advice of your health care provider. Copyright   2003, 2019 [facility]. All rights reserved. Clinically reviewed by Bernardo Castellanos MD. [employer] [id number] - REV 08/24.

## 2024-10-25 NOTE — PROGRESS NOTES
Preoperative Evaluation  [facility]  [address] [facility] [address]  Phone: [phone]  Fax: [phone]  Primary Provider: Ellen Kelsey MD  Pre-op Performing Provider: Kalee Finn DO  Oct 25, 2024             10/20/2024   Surgical Information   What procedure is being done? Spinal Cord Stimulator Implant    Facility or Hospital where procedure/surgery will be performed:  [facility]    Who is doing the procedure / surgery? Dr Llamas    Date of surgery / procedure: 10/30/24    Time of surgery / procedure: 9:00 am    Where do you plan to recover after surgery? at home with family        Patient-reported     Fax number for surgical facility: [phone]    Assessment & Plan     The proposed surgical procedure is considered INTERMEDIATE risk.    1. Preop general physical exam (Primary)  2. Degeneration of intervertebral disc of lumbar region with discogenic back pain and lower extremity pain  - Basic metabolic panel  (Ca, Cl, CO2, Creat, Gluc, K, Na, BUN); Future  - CBC with platelets; Future  - EKG 12-lead, tracing only    Risks and Recommendations  The patient has the following additional risks and recommendations for perioperative complications:   - Morbid obesity (BMI >40)    Preoperative Medication Instructions  Antiplatelet or Anticoagulation Medication Instructions   - Patient is on no antiplatelet or anticoagulation medications.    Additional Medication Instructions  Okay to take Lexapro, Levothyroxine, and lovastatin as scheduled.   Hold allergy medication and eye drops on morning of procedure.   Hold vitamins/supplements until after surgery.    Recommendation  Approval given to proceed with proposed procedure, without further diagnostic evaluation.    3. Morbid obesity (H)  Working on healthy lifestyle modifications, hopefully will be able to resume focus s/p stimulator. Off GLP-1 due to insurance coverage. Following up with PCP in December.    4.  Prediabetes  - Hemoglobin A1c; Future      Previous A1c 6.5 x 2 remotely, then normalized. Due for recheck, last A1c 6.0.     Josiah Gonzalez is a 59 year old, presenting for the following:  Pre-Op Exam          10/25/2024     1:27 PM   Additional Questions   Roomed by Trey JIM MA     Via the Health Maintenance questionnaire, the patient has reported the following services have been completed -Eye Exam: Tyesha visio 2024-06-27, this information has been sent to the abstraction team.    HPI related to upcoming procedure:     Preop general physical exam (Primary)  Degeneration of intervertebral disc of lumbar region with discogenic back pain and lower extremity pain  Aby is working with pain clinic and scheduled for a spinal cord stimulator implant on October 30. Had testing injection, went well.       Working with [employer] towards qualification for SSDI  - off all work since 9/22/22.   - dealing with severe low back pain, indication for upcoming procedure.   - this is secondary to degenerative disc disease with nerve inpingement, qualifying condition for SSDI  - unable to work as an , particularly in manufacturing and construction industry  - spine specialist has recommended she is unable to work        Morbid obesity (H)  Body mass index is 48.68 kg/m .  Wt Readings from Last 4 Encounters:   10/25/24 136.8 kg (301 lb 9.6 oz)   11/21/23 132.2 kg (291 lb 6 oz)   09/13/22 128.5 kg (283 lb 3.2 oz)   08/22/22 126.6 kg (279 lb 2 oz)     Hard to work on weight management due to pain. Hoping this improves after procedure.      Prediabetes   Hemoglobin A1C   Date Value Ref Range Status   11/21/2023 6.0 (H) 0.0 - 5.6 % Final     Comment:     Normal <5.7%   Prediabetes 5.7-6.4%    Diabetes 6.5% or higher     Note: Adopted from ADA consensus guidelines.     No longer qualifies for ozempic.           10/20/2024   Pre-Op Questionnaire   Have you ever had a heart attack or stroke? No    Have you ever had surgery on your  heart or blood vessels, such as a stent placement, a coronary artery bypass, or surgery on an artery in your head, neck, heart, or legs? No    Do you have chest pain with activity? No    Do you have a history of heart failure? No    Do you currently have a cold, bronchitis or symptoms of other infection? No    Do you have a cough, shortness of breath, or wheezing? No    Do you or anyone in your family have previous history of blood clots? (!) YES - sister with DVT, provoked related OCPs    Do you or does anyone in your family have a serious bleeding problem such as prolonged bleeding following surgeries or cuts? No    Have you ever had problems with anemia or been told to take iron pills? No    Have you had any abnormal blood loss such as black, tarry or bloody stools, or abnormal vaginal bleeding? No    Have you ever had a blood transfusion? No    Are you willing to have a blood transfusion if it is medically needed before, during, or after your surgery? Yes    Have you or any of your relatives ever had problems with anesthesia? No    Do you have sleep apnea, excessive snoring or daytime drowsiness? No    Do you have any artifical heart valves or other implanted medical devices like a pacemaker, defibrillator, or continuous glucose monitor? No    Do you have artificial joints? (!) YES - bilateral hips, bilateral thumbs    Are you allergic to latex? No        Patient-reported     Health Care Directive  Patient does not have a Health Care Directive: Advance Directive received and scanned. Click on Code in the patient header to view.    Preoperative Review of    reviewed - no active prescriptions.       Status of Chronic Conditions:  See problem list for active medical problems.  Problems all longstanding and stable, except as noted/documented.  See ROS for pertinent symptoms related to these conditions.    Patient Active Problem List    Diagnosis Date Noted    Spinal stenosis of lumbar region 10/04/2021      Priority: Medium    Spondylolisthesis of lumbar region 10/04/2021     Priority: Medium    Hyperlipidemia LDL goal <100 09/23/2021     Priority: Medium    Adjustment disorder with mixed anxiety and depressed mood 09/02/2021     Priority: Medium    Endometrial hyperplasia with atypia 10/21/2020     Priority: Medium    Osteoarthritis of both hips 10/21/2020     Priority: Medium    DDD (degenerative disc disease), lumbar 10/21/2020     Priority: Medium    Hypothyroidism      Priority: Medium     Created by Conversion  Replacement Utility updated for latest IMO load      Formatting of this note might be different from the original.  Created by Conversion    Replacement Utility updated for latest IMO load      Morbid Obesity      Priority: Medium     Created by Conversion  [employer] Annotation: Jun 1 2007  6:29PM - Marlee Golden: BMI=44 6/1/07        Allergies      Priority: Medium     Created by Conversion  [employer] [facility] Annotation: Jun 1 2007  6:29PM - Marlee Golden:   seasonalchocolate/strawberriestobacco          Past Medical History:   Diagnosis Date    Abdominal pain     Blood in urine     Depressive disorder     Diabetes mellitus type 2 in obese     Fatty liver     HLD (hyperlipidemia)     Hypertension     Hypothyroidism     Morbid obesity (H)     Recurrent sinus infections     Seasonal allergies     Sleep apnea in adult     Does not use CPAP    Verruca warts (infectious)      Past Surgical History:   Procedure Laterality Date    BACK SURGERY      BIOPSY  11-    Metroobgyn    CHOLECYSTECTOMY      HC REMOVAL GALLBLADDER      Description: Cholecystectomy;  Proc Date: 03/01/2010;    SD HYSTEROSCOPY,W/ENDO BX N/A 10/28/2020    Procedure: HYSTEROSCOPY, DILATION AND CURETTAGE, POLYPECTOMY;  Surgeon: Kanika Gonzalez DO;  Location: [facility];  Service: Gynecology    TOTAL HIP ARTHROPLASTY Left 12/09/2021    TOTAL HIP ARTHROPLASTY Right 02/2022     Current Outpatient Medications   Medication  Sig Dispense Refill    escitalopram (LEXAPRO) 20 MG tablet TAKE 1 TABLET BY MOUTH EVERY DAY 90 tablet 1    fexofenadine (ALLEGRA) 60 MG tablet [FEXOFENADINE (ALLEGRA) 60 MG TABLET] Take 60 mg by mouth daily.      levothyroxine (SYNTHROID/LEVOTHROID) 112 MCG tablet TAKE 1 TABLET BY MOUTH EVERY DAY 90 tablet 1    lovastatin (MEVACOR) 40 MG tablet TAKE 1 TABLET BY MOUTH EVERY DAY 90 tablet 0    olopatadine (PATADAY) 0.2 % ophthalmic solution INSTILL 1 DROP INTO BOTH EYES EVERY DAY      vitamin D3 (CHOLECALCIFEROL) 50 mcg (2000 units) tablet Take 1 tablet by mouth daily      amoxicillin (AMOXIL) 500 MG capsule Take 500 mg by mouth  TAKE 4 CAPSULES BY MOUTH 1 HOUR BEFORE DENTAL APPOINTMENT (Patient not taking: Reported on 11/21/2023)      aspirin 81 MG EC tablet [ASPIRIN 81 MG EC TABLET] Take 1 tablet (81 mg total) by mouth daily. 150 tablet 2    semaglutide (OZEMPIC, 0.25 OR 0.5 MG/DOSE,) 2 MG/3ML pen INJECT 0.25MG SUBCUTANEOUSLY EVERY 7 DAYS (Patient not taking: Reported on 10/25/2024) 3 mL 1       Allergies   Allergen Reactions    Simvastatin Diarrhea        Social History     Tobacco Use    Smoking status: Never     Passive exposure: Never    Smokeless tobacco: Never   Substance Use Topics    Alcohol use: Not Currently     Alcohol/week: 1.0 - 3.0 standard drink of alcohol     Family History   Problem Relation Age of Onset    Thyroid Disease Mother     Asthma Mother     Depression Mother     Hypertension Father     Heart Disease Father     Alzheimer Disease Maternal Grandfather     Cerebrovascular Disease Paternal Grandfather     Deep Vein Thrombosis Sister     Osteoporosis No family hx of     Cancer No family hx of     Other - See Comments No family hx of         Chemical dependency     History   Drug Use No             Review of Systems  Constitutional, HEENT, cardiovascular, pulmonary, GI, , musculoskeletal, neuro, skin, endocrine and psych systems are negative, except as otherwise noted.    Objective    BP  "120/80   Pulse 77   Temp 97.9  F (36.6  C) (Oral)   Resp 16   Ht 1.676 m (5' 6\")   Wt 136.8 kg (301 lb 9.6 oz)   LMP  (LMP Unknown)   SpO2 95%   BMI 48.68 kg/m     Estimated body mass index is 48.68 kg/m  as calculated from the following:    Height as of this encounter: 1.676 m (5' 6\").    Weight as of this encounter: 136.8 kg (301 lb 9.6 oz).  Physical Exam    GENERAL: alert, pleasant, obese, and no distress  EYES: Eyes grossly normal to inspection, PERRL and conjunctivae and sclerae normal  HENT: ear canals and TM's normal, nose and mouth without ulcers or lesions  NECK: no adenopathy, no asymmetry, masses, or scars  RESP: lungs clear to auscultation - no rales, rhonchi or wheezes  CV: regular rate and rhythm, no murmurs   ABDOMEN: soft, nontender, no hepatosplenomegaly, no masses and bowel sounds normal  MS: no gross musculoskeletal defects noted, no edema, ambulating with cane   SKIN: no suspicious lesions or rashes  NEURO: No gross deficits present   PSYCH: mentation appears normal, affect normal/bright    Recent Labs   Lab Test 11/21/23  1711   HGB 14.1         POTASSIUM 4.1   CR 1.00*   A1C 6.0*        Diagnostics  Recent Results (from the past 240 hours)   EKG 12-lead, tracing only    Collection Time: 10/25/24  7:04 AM   Result Value Ref Range    Systolic Blood Pressure  mmHg    Diastolic Blood Pressure  mmHg    Ventricular Rate 78 BPM    Atrial Rate 78 BPM    ME Interval 172 ms    QRS Duration 76 ms     ms    QTc 446 ms    P Axis 7 degrees    R AXIS 11 degrees    T Axis 13 degrees    Interpretation ECG       Sinus rhythm  Normal ECG  When compared with ECG of 23-Nov-2021 09:32,  No significant change was found  Confirmed by MARISOL AQUINO MD LOC:JOSEPH ([id number]) on 10/25/2024 4:11:31 PM     Hemoglobin A1c    Collection Time: 10/25/24  2:19 PM   Result Value Ref Range    Estimated Average Glucose 137 (H) <117 mg/dL    Hemoglobin A1C 6.4 (H) 0.0 - 5.6 %   Basic metabolic panel  (Ca, " Cl, CO2, Creat, Gluc, K, Na, BUN)    Collection Time: 10/25/24  2:19 PM   Result Value Ref Range    Sodium 137 135 - 145 mmol/L    Potassium 4.1 3.4 - 5.3 mmol/L    Chloride 100 98 - 107 mmol/L    Carbon Dioxide (CO2) 26 22 - 29 mmol/L    Anion Gap 11 7 - 15 mmol/L    Urea Nitrogen 16.0 6.0 - 20.0 mg/dL    Creatinine 1.11 (H) 0.51 - 0.95 mg/dL    GFR Estimate 57 (L) >60 mL/min/1.73m2    Calcium 9.4 8.8 - 10.4 mg/dL    Glucose 104 (H) 70 - 99 mg/dL   CBC with platelets    Collection Time: 10/25/24  2:19 PM   Result Value Ref Range    WBC Count 8.0 4.0 - 11.0 10e3/uL    RBC Count 4.88 3.80 - 5.20 10e6/uL    Hemoglobin 14.3 11.7 - 15.7 g/dL    Hematocrit 44.3 35.0 - 47.0 %    MCV 91 78 - 100 fL    MCH 29.3 26.5 - 33.0 pg    MCHC 32.3 31.5 - 36.5 g/dL    RDW 11.9 10.0 - 15.0 %    Platelet Count 292 150 - 450 10e3/uL         EKG required for comordities and not completed in the last 90 days.     Revised Cardiac Risk Index (RCRI)  The patient has the following serious cardiovascular risks for perioperative complications:   - No serious cardiac risks = 0 points     RCRI Interpretation: 0 points: Class I (very low risk - 0.4% complication rate)         Signed Electronically by: Kalee Finn DO  A copy of this evaluation report is provided to the requesting physician.

## 2024-10-26 LAB
ANION GAP SERPL CALCULATED.3IONS-SCNC: 11 MMOL/L (ref 7–15)
BUN SERPL-MCNC: 16 MG/DL (ref 6–20)
CALCIUM SERPL-MCNC: 9.4 MG/DL (ref 8.8–10.4)
CHLORIDE SERPL-SCNC: 100 MMOL/L (ref 98–107)
CREAT SERPL-MCNC: 1.11 MG/DL (ref 0.51–0.95)
EGFRCR SERPLBLD CKD-EPI 2021: 57 ML/MIN/1.73M2
GLUCOSE SERPL-MCNC: 104 MG/DL (ref 70–99)
HCO3 SERPL-SCNC: 26 MMOL/L (ref 22–29)
POTASSIUM SERPL-SCNC: 4.1 MMOL/L (ref 3.4–5.3)
SODIUM SERPL-SCNC: 137 MMOL/L (ref 135–145)

## 2024-10-28 NOTE — RESULT ENCOUNTER NOTE
Patient updated by [facility] message with lab results.       Labs look good. We will forward your preop note. I hope your procedure goes smoothly.  Kalee Finn, DO

## 2024-10-28 NOTE — RESULT ENCOUNTER NOTE
Patient updated by [employer] message with EKG results.       Gabriela Badillo,  Your EKG has returned normal and unchanged from prior.  Kalee Finn, DO

## 2024-11-26 ENCOUNTER — TRANSFERRED RECORDS (OUTPATIENT)
Dept: HEALTH INFORMATION MANAGEMENT | Facility: CLINIC | Age: 59
End: 2024-11-26
Payer: COMMERCIAL

## 2024-11-30 DIAGNOSIS — E78.5 HYPERLIPIDEMIA, UNSPECIFIED: ICD-10-CM

## 2024-12-02 RX ORDER — LOVASTATIN 40 MG/1
TABLET ORAL
Qty: 90 TABLET | Refills: 0 | Status: SHIPPED | OUTPATIENT
Start: 2024-12-02

## 2025-01-25 SDOH — HEALTH STABILITY: PHYSICAL HEALTH: ON AVERAGE, HOW MANY DAYS PER WEEK DO YOU ENGAGE IN MODERATE TO STRENUOUS EXERCISE (LIKE A BRISK WALK)?: 0 DAYS

## 2025-01-25 SDOH — HEALTH STABILITY: PHYSICAL HEALTH: ON AVERAGE, HOW MANY MINUTES DO YOU ENGAGE IN EXERCISE AT THIS LEVEL?: 0 MIN

## 2025-01-25 ASSESSMENT — SOCIAL DETERMINANTS OF HEALTH (SDOH): HOW OFTEN DO YOU GET TOGETHER WITH FRIENDS OR RELATIVES?: ONCE A WEEK

## 2025-01-27 ENCOUNTER — TRANSFERRED RECORDS (OUTPATIENT)
Dept: HEALTH INFORMATION MANAGEMENT | Facility: CLINIC | Age: 60
End: 2025-01-27
Payer: COMMERCIAL

## 2025-01-30 ENCOUNTER — ORDERS ONLY (AUTO-RELEASED) (OUTPATIENT)
Dept: FAMILY MEDICINE | Facility: CLINIC | Age: 60
End: 2025-01-30

## 2025-01-30 ENCOUNTER — OFFICE VISIT (OUTPATIENT)
Dept: FAMILY MEDICINE | Facility: CLINIC | Age: 60
End: 2025-01-30
Payer: COMMERCIAL

## 2025-01-30 VITALS
OXYGEN SATURATION: 98 % | SYSTOLIC BLOOD PRESSURE: 128 MMHG | HEIGHT: 66 IN | HEART RATE: 82 BPM | DIASTOLIC BLOOD PRESSURE: 86 MMHG | WEIGHT: 293 LBS | RESPIRATION RATE: 16 BRPM | BODY MASS INDEX: 47.09 KG/M2 | TEMPERATURE: 98 F

## 2025-01-30 DIAGNOSIS — E78.5 HYPERLIPIDEMIA LDL GOAL <100: ICD-10-CM

## 2025-01-30 DIAGNOSIS — Z86.39 HISTORY OF DIABETES MELLITUS: ICD-10-CM

## 2025-01-30 DIAGNOSIS — E11.9 TYPE 2 DIABETES MELLITUS WITHOUT COMPLICATION, WITHOUT LONG-TERM CURRENT USE OF INSULIN (H): ICD-10-CM

## 2025-01-30 DIAGNOSIS — Z00.00 ENCOUNTER FOR PREVENTATIVE ADULT HEALTH CARE EXAMINATION: Primary | ICD-10-CM

## 2025-01-30 DIAGNOSIS — M51.362 DEGENERATION OF INTERVERTEBRAL DISC OF LUMBAR REGION WITH DISCOGENIC BACK PAIN AND LOWER EXTREMITY PAIN: ICD-10-CM

## 2025-01-30 DIAGNOSIS — Z12.11 SCREENING FOR COLON CANCER: ICD-10-CM

## 2025-01-30 DIAGNOSIS — L98.9 SKIN SORE: ICD-10-CM

## 2025-01-30 DIAGNOSIS — K14.8 TONGUE LESION: ICD-10-CM

## 2025-01-30 DIAGNOSIS — Z12.31 VISIT FOR SCREENING MAMMOGRAM: ICD-10-CM

## 2025-01-30 DIAGNOSIS — E03.9 HYPOTHYROIDISM, UNSPECIFIED TYPE: ICD-10-CM

## 2025-01-30 DIAGNOSIS — F43.23 ADJUSTMENT DISORDER WITH MIXED ANXIETY AND DEPRESSED MOOD: ICD-10-CM

## 2025-01-30 DIAGNOSIS — E66.01 MORBID OBESITY (H): ICD-10-CM

## 2025-01-30 LAB
ALBUMIN SERPL BCG-MCNC: 4.5 G/DL (ref 3.5–5.2)
ALP SERPL-CCNC: 85 U/L (ref 40–150)
ALT SERPL W P-5'-P-CCNC: 47 U/L (ref 0–50)
ANION GAP SERPL CALCULATED.3IONS-SCNC: 10 MMOL/L (ref 7–15)
AST SERPL W P-5'-P-CCNC: 36 U/L (ref 0–45)
BILIRUB SERPL-MCNC: 0.4 MG/DL
BUN SERPL-MCNC: 18.4 MG/DL (ref 6–20)
CALCIUM SERPL-MCNC: 10.1 MG/DL (ref 8.8–10.4)
CHLORIDE SERPL-SCNC: 104 MMOL/L (ref 98–107)
CHOLEST SERPL-MCNC: 211 MG/DL
CREAT SERPL-MCNC: 0.99 MG/DL (ref 0.51–0.95)
CREAT UR-MCNC: 68.7 MG/DL
EGFRCR SERPLBLD CKD-EPI 2021: 66 ML/MIN/1.73M2
ERYTHROCYTE [DISTWIDTH] IN BLOOD BY AUTOMATED COUNT: 12.5 % (ref 10–15)
EST. AVERAGE GLUCOSE BLD GHB EST-MCNC: 143 MG/DL
FASTING STATUS PATIENT QL REPORTED: YES
FASTING STATUS PATIENT QL REPORTED: YES
GLUCOSE SERPL-MCNC: 121 MG/DL (ref 70–99)
HBA1C MFR BLD: 6.6 % (ref 0–5.6)
HCO3 SERPL-SCNC: 26 MMOL/L (ref 22–29)
HCT VFR BLD AUTO: 44.4 % (ref 35–47)
HDLC SERPL-MCNC: 55 MG/DL
HGB BLD-MCNC: 14.3 G/DL (ref 11.7–15.7)
LDLC SERPL CALC-MCNC: 124 MG/DL
MCH RBC QN AUTO: 29.1 PG (ref 26.5–33)
MCHC RBC AUTO-ENTMCNC: 32.2 G/DL (ref 31.5–36.5)
MCV RBC AUTO: 90 FL (ref 78–100)
MICROALBUMIN UR-MCNC: <12 MG/L
MICROALBUMIN/CREAT UR: NORMAL MG/G{CREAT}
NONHDLC SERPL-MCNC: 156 MG/DL
PLATELET # BLD AUTO: 312 10E3/UL (ref 150–450)
POTASSIUM SERPL-SCNC: 4.3 MMOL/L (ref 3.4–5.3)
PROT SERPL-MCNC: 7.7 G/DL (ref 6.4–8.3)
RBC # BLD AUTO: 4.92 10E6/UL (ref 3.8–5.2)
SODIUM SERPL-SCNC: 140 MMOL/L (ref 135–145)
T4 FREE SERPL-MCNC: 1.1 NG/DL (ref 0.9–1.7)
TRIGL SERPL-MCNC: 160 MG/DL
TSH SERPL DL<=0.005 MIU/L-ACNC: 5.93 UIU/ML (ref 0.3–4.2)
VIT D+METAB SERPL-MCNC: 43 NG/ML (ref 20–50)
WBC # BLD AUTO: 6.6 10E3/UL (ref 4–11)

## 2025-01-30 RX ORDER — LEVOTHYROXINE SODIUM 112 UG/1
112 TABLET ORAL DAILY
Qty: 90 TABLET | Refills: 1 | Status: CANCELLED | OUTPATIENT
Start: 2025-01-30

## 2025-01-30 RX ORDER — ESCITALOPRAM OXALATE 20 MG/1
20 TABLET ORAL DAILY
Qty: 90 TABLET | Refills: 3 | Status: SHIPPED | OUTPATIENT
Start: 2025-01-30

## 2025-01-30 RX ORDER — MUPIROCIN 20 MG/G
OINTMENT TOPICAL 3 TIMES DAILY
Qty: 30 G | Refills: 3 | Status: SHIPPED | OUTPATIENT
Start: 2025-01-30

## 2025-01-30 RX ORDER — LOVASTATIN 40 MG/1
40 TABLET ORAL DAILY
Qty: 90 TABLET | Refills: 3 | Status: CANCELLED | OUTPATIENT
Start: 2025-01-30

## 2025-01-30 NOTE — PATIENT INSTRUCTIONS
Please check with insurance if a DEXA scan or bone density scan is covered.  We link that to the diagnosis of menopause.    Referral to an oral surgeon to have a biopsy of the white spot on your tongue.  Please call them at [phone].    Dr. Kelsey will write a letter of support for your Social Security disability application and send it to you via [employer].    Also glad you are following with Ortho for the degenerative disc disease with right leg radiculopathy and nerve impingement.    If you are thinking of doing the spinal cord stimulator you would likely need to have that done at the hospital.    If your A1c is 6.5 or higher that is a diagnosis of diabetes and I think you would qualify for GLP-1 like Ozempic or Mounjaro but you do want to check with your insurance.      Health Maintenance   Topic Date Due    ANNUAL REVIEW OF HM ORDERS  Today    Pneumococcal Vaccine: 50+ Years (2 of 2 - PCV) Today    YEARLY PREVENTIVE VISIT  Today    MICROALBUMIN  Today    INFLUENZA VACCINE (1) Today    COVID-19 Vaccine (5 - 2024-25 season) Today    LIPID  Today    TSH W/FREE T4 REFLEX  Today    DIABETIC FOOT EXAM  Today    A1C  Today    EYE EXAM  06/27/2025    BMP  Today    MAMMO SCREENING  06/24/2025    HPV TEST  11/09/2028    PAP  11/09/2028    ADVANCE CARE PLANNING  We would like a copy please    COLORECTAL CANCER SCREENING  Cologaurd ordered    DTAP/TDAP/TD IMMUNIZATION (3 - Td or Tdap) 11/23/2031    RSV VACCINE (1 - 1-dose 75+ series) Age 60    HEPATITIS C SCREENING  Completed    PHQ-2 (once per calendar year)  Completed    ZOSTER IMMUNIZATION  Completed    HEPATITIS B IMMUNIZATION  Completed    HPV IMMUNIZATION  Aged Out    MENINGITIS IMMUNIZATION  Aged Out    RSV MONOCLONAL ANTIBODY  Aged Out    HIV SCREENING  Discontinued

## 2025-01-30 NOTE — PROGRESS NOTES
Preventive Care Visit  [facility]  Ellen Kelsey MD, Family Medicine  Jan 30, 2025      Assessment & Plan       ICD-10-CM    1. Encounter for preventative adult health care examination  Z00.00       2. Adjustment disorder with mixed anxiety and depressed mood  F43.23 escitalopram (LEXAPRO) 20 MG tablet      3. Hypothyroidism, unspecified type  E03.9 TSH WITH FREE T4 REFLEX     TSH WITH FREE T4 REFLEX      4. Hyperlipidemia LDL goal <100  E78.5 Lipid panel reflex to direct LDL Non-fasting     Comprehensive metabolic panel (BMP + Alb, Alk Phos, ALT, AST, Total. Bili, TP)     CBC with platelets     Lipid panel reflex to direct LDL Non-fasting     Comprehensive metabolic panel (BMP + Alb, Alk Phos, ALT, AST, Total. Bili, TP)     CBC with platelets      5. Morbid Obesity  E66.01       6. History of diabetes mellitus  Z86.39 Albumin Random Urine Quantitative with Creat Ratio     Hemoglobin A1c     Albumin Random Urine Quantitative with Creat Ratio     Hemoglobin A1c      7. Degeneration of intervertebral disc of lumbar region with discogenic back pain and lower extremity pain  M51.362 Vitamin D Deficiency     Vitamin D Deficiency      8. Screening for colon cancer  Z12.11 COLOGUARD(EXACT SCIENCES)      9. Visit for screening mammogram  Z12.31 MA Screen Bilateral w/Gabriel      10. Skin sore  L98.9 mupirocin (BACTROBAN) 2 % external ointment      11. Tongue lesion  K14.8 Oral Surgery Referral        Please check with insurance if a DEXA scan or bone density scan is covered.  We link that to the diagnosis of menopause.    Referral to an oral surgeon to have a biopsy of the white spot on your tongue.  Please call them at [phone].    Dr. Kelsey will write a letter of support for your Social Security disability application and send it to you via [employer].    Also glad you are following with Ortho for the degenerative disc disease with right leg radiculopathy and nerve impingement.    If you  "are thinking of doing the spinal cord stimulator you would likely need to have that done at the hospital.    If your A1c is 6.5 or higher that is a diagnosis of diabetes and I think you would qualify for GLP-1 like Ozempic or Mounjaro but you do want to check with your insurance.    The longitudinal plan of care for the diagnosis(es)/condition(s) as documented were addressed during this visit. Due to the added complexity in care, I will continue to support Carlos in the subsequent management and with ongoing continuity of care.  Helping to manage her prediabetes and all health care issues.    Patient has been advised of split billing requirements and indicates understanding: Yes        BMI  Estimated body mass index is 50.23 kg/m  as calculated from the following:    Height as of this encounter: 1.676 m (5' 6\").    Weight as of this encounter: 141.2 kg (311 lb 3.2 oz).   Weight management plan: Discussed healthy diet and exercise guidelines    Counseling  Appropriate preventive services were addressed with this patient via screening, questionnaire, or discussion as appropriate for fall prevention, nutrition, physical activity, Tobacco-use cessation, social engagement, weight loss and cognition.  Checklist reviewing preventive services available has been given to the patient.  Reviewed patient's diet, addressing concerns and/or questions.           Subjective   Carlos is a 59 year old, presenting for the following:  Physical (Fasting for blood work )        1/30/2025    10:57 AM   Additional Questions   Roomed by Shannan SARAVIA            History of diabetes: Patient did have an A1c of 6.54 years ago.  She said since her A1c came down under 6.5 off medication she said she no longer has diabetes but agreeable to have A1c checks.  If A1c is 6.5 or higher she knows she does have diabetes and would be agreeable to think about a GLP-1 if covered by her insurance.    Obesity: Working on healthy diet and " exercise.  Her insurance will not cover a GLP-1 for weight.    Sore on face at times: Will get a sore in her face periodically and use mupirocin ointment.  She would like a refill.    Tongue:  Dentist noticed white spot on right lateral tongue.  He said she should see an oral surgeon for biopsy.    Mood:  Doing well.    Lumbar disc degeneration with radiculopathy down the right leg and nerve impingement: Having severe back pain at an 8 out of 10 and it radiates down her right leg to her foot.  She does follow with [facility].  [facility]  Ongokng symptoms.  Did Pt. Sept 2024 spinal cord stimulator trial and gave relief.  Scheduled surgery for stimulator in Oct. 2024.  Went in for surgery and they gave her fentyl and she stopped breathing.  Per patient She was on her stomach and then tipped her over to her back and did not start breathing on her own for 20 minutes, was intubated.  Per patient her pleural sac filled up with water and mucus.  She felt like she was drowning.  They kept her in the recovery area at [facility].  She has had no further respiratory issues.    Hypothyroid:  On medication.    Hyperlipidemia: On medication.    Social: Applying for social security disability.  She would like a letter of support.  She is also getting a letter of support from Ortho.  Unable to work in any job because of her back.  She has degenerative disc disease with impingement on the right and pain down to her foot.  Pain 8/10.  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  Can't stand longer than 5 minutes, cannot sit longer than 20 min, can't think clearly.  [employer] has put her on permanent disability.              Health Maintenance   Topic Date Due    ANNUAL REVIEW OF HM ORDERS  Today    Pneumococcal Vaccine: 50+ Years (2 of 2 - PCV) Today    YEARLY PREVENTIVE VISIT  Today    MICROALBUMIN  Today    INFLUENZA VACCINE (1) Today    COVID-19 Vaccine (5 -  2024-25 season) Today    LIPID  Today    TSH W/FREE T4 REFLEX  Today    DIABETIC FOOT EXAM  Today    A1C  Today    EYE EXAM  06/27/2025    BMP  Today    MAMMO SCREENING  06/24/2025    HPV TEST  11/09/2028    PAP  11/09/2028    ADVANCE CARE PLANNING  We would like a copy please    COLORECTAL CANCER SCREENING  Cologaurd ordered    DTAP/TDAP/TD IMMUNIZATION (3 - Td or Tdap) 11/23/2031    RSV VACCINE (1 - 1-dose 75+ series) Age 60    HEPATITIS C SCREENING  Completed    PHQ-2 (once per calendar year)  Completed    ZOSTER IMMUNIZATION  Completed    HEPATITIS B IMMUNIZATION  Completed    HPV IMMUNIZATION  Aged Out    MENINGITIS IMMUNIZATION  Aged Out    RSV MONOCLONAL ANTIBODY  Aged Out    HIV SCREENING  Discontinued       Health Care Directive  Patient does not have a Health Care Directive: Patient states has Advance Directive and will bring in a copy to clinic.      1/25/2025   General Health   How would you rate your overall physical health? (!) FAIR   Feel stress (tense, anxious, or unable to sleep) Only a little   (!) STRESS CONCERN      1/25/2025   Nutrition   Three or more servings of calcium each day? Yes   Diet: Regular (no restrictions)   How many servings of fruit and vegetables per day? (!) 2-3   How many sweetened beverages each day? 0-1         1/25/2025   Exercise   Days per week of moderate/strenous exercise 0 days   Average minutes spent exercising at this level 0 min   (!) EXERCISE CONCERN      1/25/2025   Social Factors   Frequency of gathering with friends or relatives Once a week   Worry food won't last until get money to buy more No   Food not last or not have enough money for food? No   Do you have housing? (Housing is defined as stable permanent housing and does not include staying ouside in a car, in a tent, in an abandoned building, in an overnight shelter, or couch-surfing.) Yes   Are you worried about losing your housing? No   Lack of transportation? No   Unable to get utilities  (heat,electricity)? No         1/30/2025   Fall Risk   Gait Speed Test (Document in seconds) 4.53   Gait Speed Test Interpretation Less than or equal to 5.00 seconds - PASS          1/25/2025   Dental   Dentist two times every year? Yes         12/22/2024   TB Screening   Were you born outside of the [place]? No         Today's PHQ-2 Score:       1/30/2025    10:54 AM   PHQ-2 ( 1999 Pf[employer])   Q1: Little interest or pleasure in doing things 1   Q2: Feeling down, depressed or hopeless 1   PHQ-2 Score 2    Q1: Little interest or pleasure in doing things Several days   Q2: Feeling down, depressed or hopeless Several days   PHQ-2 Score 2       Patient-reported           1/25/2025   Substance Use   Alcohol more than 3/day or more than 7/wk No   Do you use any other substances recreationally? No     Social History     Tobacco Use    Smoking status: Never     Passive exposure: Never    Smokeless tobacco: Never   Vaping Use    Vaping status: Never Used   Substance Use Topics    Alcohol use: Not Currently     Alcohol/week: 1.0 - 3.0 standard drink of alcohol    Drug use: No           6/24/2024   LAST FHS-7 RESULTS   1st degree relative breast or ovarian cancer No   Any relative bilateral breast cancer No   Any male have breast cancer No   Any ONE woman have BOTH breast AND ovarian cancer No   Any woman with breast cancer before 50yrs No   2 or more relatives with breast AND/OR ovarian cancer No   2 or more relatives with breast AND/OR bowel cancer No        Mammogram Screening - Mammogram every 1-2 years updated in Health Maintenance based on mutual decision making        1/25/2025   STI Screening   New sexual partner(s) since last STI/HIV test? No     History of abnormal Pap smear: see below        Latest Ref Rng & Units 11/9/2023     9:32 AM 9/3/2020     5:03 PM 8/8/2019    11:15 AM   PAP / HPV   PAP  Negative for Intraepithelial Lesion or Malignancy (NILM)   Negative for squamous intraepithelial lesion or  "malignancy  Electronically signed by Jocelynn Solis CT (ASCP) on 8/15/2019 at  8:58 AM      HPV 16 DNA NEG   Negative    HPV 18 DNA NEG   Negative    Other HR HPV NEG   Negative    PAP-ABSTRACT   See Scanned Document            This result is from an external source.     ASCVD Risk   The 10-year ASCVD risk score (Jonas DANG, et al., 2019) is: 4.9%    Values used to calculate the score:      Age: 59 years      Sex: Female      Is Non- : No      Diabetic: Yes      Tobacco smoker: No      Systolic Blood Pressure: 128 mmHg      Is BP treated: No      HDL Cholesterol: 58 mg/dL      Total Cholesterol: 199 mg/dL           Reviewed and updated as needed this visit by Provider     Meds                         Objective    Exam  /86 (BP Location: Left arm, Patient Position: Sitting, Cuff Size: Adult Large)   Pulse 82   Temp 98  F (36.7  C) (Oral)   Resp 16   Ht 1.676 m (5' 6\")   Wt (!) 141.2 kg (311 lb 3.2 oz)   LMP  (LMP Unknown)   SpO2 98%   BMI 50.23 kg/m     Estimated body mass index is 50.23 kg/m  as calculated from the following:    Height as of this encounter: 1.676 m (5' 6\").    Weight as of this encounter: 141.2 kg (311 lb 3.2 oz).    Physical Exam  GENERAL: alert and no distress  EYES: Eyes grossly normal to inspection, PERRL and conjunctivae and sclerae normal  HENT: ear canals and TM's normal, nose and mouth without ulcers or lesions, send for a white macule on the right lateral tongue  NECK: no adenopathy, no asymmetry, masses, or scars  RESP: lungs clear to auscultation - no rales, rhonchi or wheezes  CV: regular rate and rhythm, normal S1 S2, no S3 or S4, no murmur, click or rub, no peripheral edema  ABDOMEN: soft, nontender, no hepatosplenomegaly, no masses and bowel sounds normal  MS: no gross musculoskeletal defects noted, no edema  SKIN: no suspicious lesions or rashes  NEURO: Deep tendon reflex left patellar tendon 2+, right patellar tendon 0, mentation " intact and speech normal  PSYCH: mentation appears normal, affect normal/bright    Prior to immunization administration, verified patients identity using patient s name and date of birth. Please see Immunization Activity for additional information.     Screening Questionnaire for Adult Immunization    Are you sick today?   No   Do you have allergies to medications, food, a vaccine component or latex?   No   Have you ever had a serious reaction after receiving a vaccination?   No   Do you have a long-term health problem with heart, lung, kidney, or metabolic disease (e.g., diabetes), asthma, a blood disorder, no spleen, complement component deficiency, a cochlear implant, or a spinal fluid leak?  Are you on long-term aspirin therapy?   No   Do you have cancer, leukemia, HIV/AIDS, or any other immune system problem?   No   Do you have a parent, brother, or sister with an immune system problem?   No   In the past 3 months, have you taken medications that affect  your immune system, such as prednisone, other steroids, or anticancer drugs; drugs for the treatment of rheumatoid arthritis, Crohn s disease, or psoriasis; or have you had radiation treatments?   No   Have you had a seizure, or a brain or other nervous system problem?   No   During the past year, have you received a transfusion of blood or blood    products, or been given immune (gamma) globulin or antiviral drug?   No   For women: Are you pregnant or is there a chance you could become       pregnant during the next month?   No   Have you received any vaccinations in the past 4 weeks?   No     Immunization questionnaire answers were all negative.      Patient instructed to remain in clinic for 15 minutes afterwards, and to report any adverse reactions.     Screening performed by Shannan Vidal MA on 1/30/2025 at 12:47 PM.         Signed Electronically by: Ellen Kelsey MD

## 2025-01-30 NOTE — LETTER
Aby PATRICIA Giraldo  1965 January 30, 2025    To whom it may concern:    This is a letter of support for the patient's application for Social Security disability.  This patient has severe lumbar disc degeneration with right lower extremity radiculopathy and nerve impingement.  She has already had surgery for this and the symptoms are persisting.  She would rate her pain as an 8 out of 10.  She also feels she has constant pain in the right foot.  She has absent deep tendon reflexes at the right patellar tendon showing nerve involvement.  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  She cannot sit longer than 5 minutes or stand longer than 20 minutes.  [employer] has put her on permanent disability.    Feel free to contact me with any questions or concerns.        Sincerely,        Ellen Kelsey MD

## 2025-02-19 ENCOUNTER — MYC MEDICAL ADVICE (OUTPATIENT)
Dept: FAMILY MEDICINE | Facility: CLINIC | Age: 60
End: 2025-02-19
Payer: COMMERCIAL

## 2025-02-19 DIAGNOSIS — E11.9 TYPE 2 DIABETES MELLITUS WITHOUT COMPLICATION, WITHOUT LONG-TERM CURRENT USE OF INSULIN (H): ICD-10-CM

## 2025-02-19 DIAGNOSIS — K14.8 TONGUE LESION: ICD-10-CM

## 2025-02-19 DIAGNOSIS — Z78.0 MENOPAUSE: Primary | ICD-10-CM

## 2025-02-19 LAB — NONINV COLON CA DNA+OCC BLD SCRN STL QL: NEGATIVE

## 2025-02-20 ENCOUNTER — TELEPHONE (OUTPATIENT)
Dept: FAMILY MEDICINE | Facility: CLINIC | Age: 60
End: 2025-02-20
Payer: COMMERCIAL

## 2025-02-20 ENCOUNTER — PATIENT OUTREACH (OUTPATIENT)
Dept: CARE COORDINATION | Facility: CLINIC | Age: 60
End: 2025-02-20
Payer: COMMERCIAL

## 2025-02-20 DIAGNOSIS — E11.9 TYPE 2 DIABETES MELLITUS WITHOUT COMPLICATION, WITHOUT LONG-TERM CURRENT USE OF INSULIN (H): Primary | ICD-10-CM

## 2025-02-20 RX ORDER — TIRZEPATIDE 2.5 MG/.5ML
INJECTION, SOLUTION SUBCUTANEOUS
Qty: 4 ML | Refills: 1 | Status: SHIPPED | OUTPATIENT
Start: 2025-02-20

## 2025-02-20 NOTE — TELEPHONE ENCOUNTER
Called and left a message informing patient of the basics. Suggested that she call back if she is needing more information.

## 2025-02-20 NOTE — TELEPHONE ENCOUNTER
Retail Pharmacy Prior Authorization Team   Phone: [phone]    Ellen Kelsey MD  [facility] Referrals - Primary Care; [facility] Med15 hours ago (5:08 PM)     NEGRO Ochoa,  Please see message from the patient below.  I did enter an order for DEXA scan, referral to oral and facial surgery and prescribe Mounjaro.  Could you help with the prior authorizations please, thank you.  MD [place] Elizabeth A, MD routed conversation to15 hours ago (5:04 PM)     Alba Parks MA routed conversation to Ellen Kelsey MD15 hours ago (5:02 PM)     Carlos DIETZ [facility] [place] (supporting Ellen Kelsey MD)15 hours ago (4:51 PM)     JT  At my 1/30/25 visit, you asked me to check with [employer] to find out if there is coverage for 1) DEXA scan, 2) [facility] to biopsy spot on tongue, and 3) GLP-1 like Ozempic or Mounjaro.  Today [employer] said all three things are not covered unless you submit a prior authorization and if they approve each pa.  Will you please submit pa's?

## 2025-02-20 NOTE — TELEPHONE ENCOUNTER
I know this is a lot but please see all of the notes below from the prior authorization team's.  Thank you.  Ellen Kelsey MD

## 2025-02-23 NOTE — TELEPHONE ENCOUNTER
Zepbound excluded, you will need to re-order as Mounjaro for T2D dx    Prior Authorization Not Needed per Insurance    Medication: ZEPBOUND 2.5 MG/0.5ML SC SOAJ  Insurance Company: [employer] Non-Specialty PA's - Phone [phone] Fax [phone]  Expected CoPay: $    Pharmacy Filling the Rx:    Pharmacy Notified: no  Patient Notified: no

## 2025-02-24 DIAGNOSIS — E11.9 TYPE 2 DIABETES MELLITUS WITHOUT COMPLICATION, WITHOUT LONG-TERM CURRENT USE OF INSULIN (H): ICD-10-CM

## 2025-02-24 RX ORDER — TIRZEPATIDE 2.5 MG/.5ML
2.5 INJECTION, SOLUTION SUBCUTANEOUS
Qty: 4 ML | Refills: 1 | Status: SHIPPED | OUTPATIENT
Start: 2025-02-24

## 2025-02-24 RX ORDER — TIRZEPATIDE 2.5 MG/.5ML
2.5 INJECTION, SOLUTION SUBCUTANEOUS
Qty: 2 ML | Refills: 1 | Status: SHIPPED | OUTPATIENT
Start: 2025-02-24 | End: 2025-02-24

## 2025-02-25 DIAGNOSIS — E11.9 TYPE 2 DIABETES MELLITUS WITHOUT COMPLICATION, WITHOUT LONG-TERM CURRENT USE OF INSULIN (H): ICD-10-CM

## 2025-02-25 RX ORDER — TIRZEPATIDE 2.5 MG/.5ML
2.5 INJECTION, SOLUTION SUBCUTANEOUS
Refills: 1 | OUTPATIENT
Start: 2025-02-25

## 2025-02-27 ENCOUNTER — TELEPHONE (OUTPATIENT)
Dept: FAMILY MEDICINE | Facility: CLINIC | Age: 60
End: 2025-02-27
Payer: COMMERCIAL

## 2025-02-27 DIAGNOSIS — E11.9 TYPE 2 DIABETES MELLITUS WITHOUT COMPLICATION, WITHOUT LONG-TERM CURRENT USE OF INSULIN (H): Primary | ICD-10-CM

## 2025-02-27 RX ORDER — TIRZEPATIDE 2.5 MG/.5ML
2.5 INJECTION, SOLUTION SUBCUTANEOUS
Refills: 1 | OUTPATIENT
Start: 2025-02-27

## 2025-02-27 NOTE — TELEPHONE ENCOUNTER
RN reached out to pharmacy.  Pharmacy does not have any options to report for cover alternatives.    I do not believe that a prior authorization was submitted for Mounjaro.  Can do that for next step    Encounter created.    Jatin Barrios RN     [facility]

## 2025-03-03 NOTE — TELEPHONE ENCOUNTER
Central Prior Authorization Team   Phone: [phone]    PA Initiation    Medication: Tirzepatide (MOUNJARO) 2.5 MG/0.5ML SOAJ  Insurance Company: [employer] - Phone [phone] Fax [phone]  Pharmacy Filling the Rx: [id number] - [place] [id number]  Filling Pharmacy Phone: [phone]  Filling Pharmacy Fax:    Start Date: 3/3/2025    [facility] KEY:  [id number]

## 2025-03-04 NOTE — TELEPHONE ENCOUNTER
PRIOR AUTHORIZATION DENIED    Medication: Tirzepatide (MOUNJARO) 2.5 MG/0.5ML SOAJ    Denial Date: 3/4/2025    Denial Rational: Patient needs to try and fail alternatives listed below:       Appeal Information: Review the plan's reasons for denial listed above. Please utilize that information to complete letter and provide specific, detailed clinical information/rationale of your patient's health status to address their denial reasons.

## 2025-03-04 NOTE — TELEPHONE ENCOUNTER
Please call patient, insurance denied Mounjaro.  I can prescribe Trulicity if she is agreeable.  Please let me know.

## 2025-03-05 NOTE — TELEPHONE ENCOUNTER
Called and spoke with patient. Informed her of the change and that she can reach out to provider in a month if she would like dose increased.

## 2025-03-05 NOTE — TELEPHONE ENCOUNTER
I sent Trulicity 0.75 mg subcutaneous injection weekly.  If after a month she would like to go to a higher dose please have her let me know.

## 2025-03-29 DIAGNOSIS — E11.9 TYPE 2 DIABETES MELLITUS WITHOUT COMPLICATION, WITHOUT LONG-TERM CURRENT USE OF INSULIN (H): ICD-10-CM

## 2025-04-01 NOTE — TELEPHONE ENCOUNTER
Attempted to call patient, left message for return call to clinic. Please ask if patient would like to increase dose of Trulicity when she returns call and then route to provider for review. Thanks!    Allie Lipscomb RN

## 2025-04-01 NOTE — TELEPHONE ENCOUNTER
The patient returned Allie's call and stated that she would like to increase the dose of Trulicity.

## 2025-04-02 RX ORDER — DULAGLUTIDE 0.75 MG/.5ML
INJECTION, SOLUTION SUBCUTANEOUS
Qty: 2 ML | Refills: 3 | Status: SHIPPED | OUTPATIENT
Start: 2025-04-02

## 2025-05-08 ENCOUNTER — TRANSFERRED RECORDS (OUTPATIENT)
Dept: HEALTH INFORMATION MANAGEMENT | Facility: CLINIC | Age: 60
End: 2025-05-08
Payer: COMMERCIAL

## 2025-06-22 DIAGNOSIS — E11.9 TYPE 2 DIABETES MELLITUS WITHOUT COMPLICATION, WITHOUT LONG-TERM CURRENT USE OF INSULIN (H): ICD-10-CM

## 2025-06-23 RX ORDER — DULAGLUTIDE 0.75 MG/.5ML
INJECTION, SOLUTION SUBCUTANEOUS
Qty: 2 ML | Refills: 3 | Status: SHIPPED | OUTPATIENT
Start: 2025-06-23

## 2025-06-26 ENCOUNTER — ANCILLARY PROCEDURE (OUTPATIENT)
Dept: MAMMOGRAPHY | Facility: CLINIC | Age: 60
End: 2025-06-26
Attending: FAMILY MEDICINE
Payer: COMMERCIAL

## 2025-06-26 DIAGNOSIS — Z12.31 VISIT FOR SCREENING MAMMOGRAM: ICD-10-CM

## 2025-06-26 PROCEDURE — 77063 BREAST TOMOSYNTHESIS BI: CPT

## 2025-06-28 ASSESSMENT — ANXIETY QUESTIONNAIRES
6. BECOMING EASILY ANNOYED OR IRRITABLE: NEARLY EVERY DAY
IF YOU CHECKED OFF ANY PROBLEMS ON THIS QUESTIONNAIRE, HOW DIFFICULT HAVE THESE PROBLEMS MADE IT FOR YOU TO DO YOUR WORK, TAKE CARE OF THINGS AT HOME, OR GET ALONG WITH OTHER PEOPLE: EXTREMELY DIFFICULT
2. NOT BEING ABLE TO STOP OR CONTROL WORRYING: MORE THAN HALF THE DAYS
1. FEELING NERVOUS, ANXIOUS, OR ON EDGE: NEARLY EVERY DAY
3. WORRYING TOO MUCH ABOUT DIFFERENT THINGS: SEVERAL DAYS
5. BEING SO RESTLESS THAT IT IS HARD TO SIT STILL: NOT AT ALL
GAD7 TOTAL SCORE: 11
7. FEELING AFRAID AS IF SOMETHING AWFUL MIGHT HAPPEN: SEVERAL DAYS
GAD7 TOTAL SCORE: 11
7. FEELING AFRAID AS IF SOMETHING AWFUL MIGHT HAPPEN: SEVERAL DAYS
8. IF YOU CHECKED OFF ANY PROBLEMS, HOW DIFFICULT HAVE THESE MADE IT FOR YOU TO DO YOUR WORK, TAKE CARE OF THINGS AT HOME, OR GET ALONG WITH OTHER PEOPLE?: EXTREMELY DIFFICULT
GAD7 TOTAL SCORE: 11
4. TROUBLE RELAXING: SEVERAL DAYS

## 2025-07-03 ENCOUNTER — VIRTUAL VISIT (OUTPATIENT)
Dept: FAMILY MEDICINE | Facility: CLINIC | Age: 60
End: 2025-07-03
Payer: COMMERCIAL

## 2025-07-03 DIAGNOSIS — M48.062 SPINAL STENOSIS OF LUMBAR REGION WITH NEUROGENIC CLAUDICATION: ICD-10-CM

## 2025-07-03 DIAGNOSIS — F32.89 OTHER DEPRESSION: ICD-10-CM

## 2025-07-03 DIAGNOSIS — E11.9 TYPE 2 DIABETES MELLITUS WITHOUT COMPLICATION, WITHOUT LONG-TERM CURRENT USE OF INSULIN (H): Primary | ICD-10-CM

## 2025-07-03 RX ORDER — BUPROPION HYDROCHLORIDE 150 MG/1
150 TABLET ORAL EVERY MORNING
Qty: 90 TABLET | Refills: 3 | Status: SHIPPED | OUTPATIENT
Start: 2025-07-03

## 2025-07-03 NOTE — PATIENT INSTRUCTIONS
Pain clinic referral for chronic back pain with spinal stenosis.  [facility] will call you to coordinate care as prescribed your provider. If you don t hear from a representative within 2 business days, please call [phone].     Continue escitalopram 20 mg daily.    Adding Wellbutrin  mg to take in the morning.    Will have short-term follow-up with a virtual visit in 1 to 2 months.    Will help set a physical in January.

## 2025-07-03 NOTE — PROGRESS NOTES
Carlos is a 59 year old who is being evaluated via a billable video visit.    How would you like to obtain your AVS? MyChart  If the video visit is dropped, the invitation should be resent by: Text to cell phone: [phone]  Will anyone else be joining your video visit? No      Assessment & Plan       ICD-10-CM    1. Type 2 diabetes mellitus without complication, without long-term current use of insulin (H)  E11.9 Adult Eye  Referral      2. Spinal stenosis of lumbar region with neurogenic claudication  M48.062 Pain Management  Referral      3. Other depression  F32.89 buPROPion (WELLBUTRIN XL) 150 MG 24 hr tablet        Pain clinic referral for chronic back pain with spinal stenosis.  [facility] will call you to coordinate care as prescribed your provider. If you don t hear from a representative within 2 business days, please call [phone].     Continue escitalopram 20 mg daily.    Adding Wellbutrin  mg to take in the morning.    Will have short-term follow-up with a virtual visit in 1 to 2 months.    Will help set a physical in January.    The longitudinal plan of care for the diagnosis(es)/condition(s) as documented were addressed during this visit. Due to the added complexity in care, I will continue to support Carlos in the subsequent management and with ongoing continuity of care.  Helping to manage her depression.        Subjective   Carlos is a 59 year old, presenting for the following health issues:  Depression (Depression check)        7/3/2025     4:52 PM   Additional Questions   Roomed by Kacie PAYAN CMA   Accompanied by self     History of Present Illness       Mental Health Follow-up:  Patient presents to follow-up on Depression & Anxiety.Patient's depression since last visit has been:  Bad  The patient is not having other symptoms associated with depression.  Patient's anxiety since last visit has been:  Medium  The patient is not having other symptoms associated with  anxiety.  Any significant life events: financial concerns and health concerns  Patient is feeling anxious or having panic attacks.  Patient has no concerns about alcohol or drug use.    She eats 2-3 servings of fruits and vegetables daily.She consumes 0 sweetened beverage(s) daily.She exercises with enough effort to increase her heart rate 20 to 29 minutes per day.  She exercises with enough effort to increase her heart rate 5 days per week.   She is taking medications regularly.                Depression: She has been suffering from depression for years.  She has been on Lexapro since August 2021.  At first the medication was helping.  She feels over the last 6 months her depression has gotten worse.  She has been routinely seeing a counselor and both the patient and the counselor felt a visit with her primary care provider to discuss additional or different medication was warranted.  Patient has no suicidal homicidal ideation.  She feels very tired and very foggy mentally.  Per the patient and her counselor her anxiety is improving.  We discussed adding Wellbutrin did some screening questions.  She does not drink alcohol as it makes her more depressed.    Stressors include dealing with ongoing failed back surgery and chronic low back pain with spinal stenosis.  She does have disability coverage now but is very tough dealing with them.  She got a letter stating she owed $60,000 as she was overpaid and they might cancel her coverage.  With a lot of work on her part they did admit that was an error and she does not owe 60,000 and they are continuing her disability insurance.  She is now applying for Social Security disability.  The situation is caused a lot of stress for her.  Feels she is very mentally and physciall exhausted.      Spinal stenosis: She was following with [facility].  They did a trial spinal cord stimulator in October.  It did help.  She went into surgery to have the spinal cord stim me later  placed and per patient she stopped breathing and it took them 20 minutes to resuscitate her.  They did not go through with the surgery.  They told her if she is going to try it again she would need to do that at a hospital.  [facility] recommended the below pain clinics:  [facility] Chasidy Kay-Dr. Castillo    She would instead be agreeable to see an [facility] pain clinic.      Phq2 (   1999 [employer],All Rights Reserved. Used With Permission. Developed By Celine Cruz,Kalyan Coelho And Colleagues,With An Educational Sarah From [employer].)    1/30/2025 10:54 AM CST - Filed by Patient 10/24/2024  6:58 PM CDT - Filed by Patient   The following questionnaire should only be answered by the patient. Are you the patient? Yes Yes   Over the last 2 weeks, how often have you been bothered by any of the following problems?     Q1: Little interest or pleasure in doing things Several days Several days   Q2: Feeling down, depressed or hopeless Several days Several days   PHQ2 (   1999 [employer],ALL RIGHTS RESERVED. USED WITH PERMISSION. DEVELOPED BY CELINE CRUZ,VERONICA MORRIS,KALYAN DIAZ AND COLLEAGUES,WITH AN EDUCATIONAL SARAH FROM [employer].)     PHQ-2 Score (range: 0 - 6) 2 2      PATIENT HEALTH QUESTIONNAIRE-9 (PHQ - 9)    Over the last 2 weeks, how often have you been bothered by any of the following problems?    1. Little interest or pleasure in doing things -   Several days 3   2. Feeling down, depressed, or hopeless -   Several days 3   3. Trouble falling or staying asleep, or sleeping too much -  Several days 3   4. Feeling tired or having little energy -  Several days 3   5. Poor appetite or overeating -   Not at all 0   6. Feeling bad about yourself - or that you are a failure or have let yourself or your family down -   Several days 3   7. Trouble concentrating on things, such as reading the newspaper or watching television -  Several days 3   8. Moving  or speaking so slowly that other people could have noticed? Or the opposite - being so fidgety or restless that you have been moving around a lot more than usual  Several days 3   9. Thoughts that you would be better off dead or of hurting  yourself in some way  Not at all 0   Total Score:  21     If you checked off any problems, how difficult have these problems made it for you to do your work, take care of things at home, or get along with other people?      Developed by Celine Cruz, Inna Schmitz, Kalyan Vo and colleagues, with an educational sid from [employer] No permission required to reproduce, translate, display or distribute. permission required to reproduce, translate, display or distribute.          Objective           Vitals:  No vitals were obtained today due to virtual visit.    Physical Exam   GENERAL: alert and no distress  PSYCH: Appropriate affect, tone, and pace of words                   Video-Visit Details    Type of service:  Video Visit   Originating Location (pt. Location): Home    Distant Location (provider location):  On-site  Platform used for Video Visit: [employer]  Signed Electronically by: Ellen Kelsey MD

## 2025-07-05 ENCOUNTER — PATIENT OUTREACH (OUTPATIENT)
Dept: CARE COORDINATION | Facility: CLINIC | Age: 60
End: 2025-07-05
Payer: COMMERCIAL

## 2025-07-07 ENCOUNTER — PATIENT OUTREACH (OUTPATIENT)
Dept: CARE COORDINATION | Facility: CLINIC | Age: 60
End: 2025-07-07
Payer: COMMERCIAL

## 2025-07-14 ENCOUNTER — TRANSFERRED RECORDS (OUTPATIENT)
Dept: HEALTH INFORMATION MANAGEMENT | Facility: CLINIC | Age: 60
End: 2025-07-14
Payer: COMMERCIAL

## 2025-08-02 ENCOUNTER — HEALTH MAINTENANCE LETTER (OUTPATIENT)
Age: 60
End: 2025-08-02

## 2025-08-08 ENCOUNTER — TRANSFERRED RECORDS (OUTPATIENT)
Dept: HEALTH INFORMATION MANAGEMENT | Facility: CLINIC | Age: 60
End: 2025-08-08
Payer: COMMERCIAL

## 2025-08-20 ENCOUNTER — PATIENT OUTREACH (OUTPATIENT)
Dept: FAMILY MEDICINE | Facility: CLINIC | Age: 60
End: 2025-08-20
Payer: COMMERCIAL